# Patient Record
Sex: FEMALE | Race: WHITE | NOT HISPANIC OR LATINO | Employment: OTHER | URBAN - METROPOLITAN AREA
[De-identification: names, ages, dates, MRNs, and addresses within clinical notes are randomized per-mention and may not be internally consistent; named-entity substitution may affect disease eponyms.]

---

## 2017-01-23 ENCOUNTER — TRANSCRIBE ORDERS (OUTPATIENT)
Dept: ADMINISTRATIVE | Facility: HOSPITAL | Age: 67
End: 2017-01-23

## 2017-01-23 ENCOUNTER — HOSPITAL ENCOUNTER (OUTPATIENT)
Dept: GENERAL RADIOLOGY | Facility: HOSPITAL | Age: 67
Discharge: HOME | End: 2017-01-23
Attending: INTERNAL MEDICINE

## 2017-01-23 DIAGNOSIS — M79.672 LEFT FOOT PAIN: Primary | ICD-10-CM

## 2017-01-23 DIAGNOSIS — M25.572 LEFT ANKLE PAIN, UNSPECIFIED CHRONICITY: ICD-10-CM

## 2018-05-25 ENCOUNTER — APPOINTMENT (OUTPATIENT)
Dept: CT IMAGING | Facility: HOSPITAL | Age: 68
End: 2018-05-25

## 2018-05-25 ENCOUNTER — HOSPITAL ENCOUNTER (EMERGENCY)
Facility: HOSPITAL | Age: 68
Discharge: HOME | End: 2018-05-25
Attending: EMERGENCY MEDICINE | Admitting: EMERGENCY MEDICINE

## 2018-05-25 VITALS
TEMPERATURE: 98.3 F | SYSTOLIC BLOOD PRESSURE: 122 MMHG | DIASTOLIC BLOOD PRESSURE: 74 MMHG | HEIGHT: 67 IN | BODY MASS INDEX: 28.25 KG/M2 | RESPIRATION RATE: 18 BRPM | HEART RATE: 84 BPM | WEIGHT: 180 LBS | OXYGEN SATURATION: 95 %

## 2018-05-25 DIAGNOSIS — R56.9 PARTIAL SEIZURE (HCC): Primary | ICD-10-CM

## 2018-05-25 LAB
ANION GAP SERPL CALCULATED.3IONS-SCNC: 8 MMOL/L (ref 3–11)
BUN BLD-MCNC: 10 MG/DL (ref 9–23)
BUN/CREAT SERPL: 14.3 (ref 7–25)
CALCIUM SPEC-SCNC: 9.4 MG/DL (ref 8.7–10.4)
CHLORIDE SERPL-SCNC: 104 MMOL/L (ref 99–109)
CO2 SERPL-SCNC: 25 MMOL/L (ref 20–31)
CREAT BLD-MCNC: 0.7 MG/DL (ref 0.6–1.3)
DEPRECATED RDW RBC AUTO: 47.3 FL (ref 37–54)
ERYTHROCYTE [DISTWIDTH] IN BLOOD BY AUTOMATED COUNT: 15.3 % (ref 11.3–14.5)
GFR SERPL CREATININE-BSD FRML MDRD: 84 ML/MIN/1.73
GLUCOSE BLD-MCNC: 129 MG/DL (ref 70–100)
HCT VFR BLD AUTO: 38.9 % (ref 34.5–44)
HGB BLD-MCNC: 13.2 G/DL (ref 11.5–15.5)
HOLD SPECIMEN: NORMAL
HOLD SPECIMEN: NORMAL
MCH RBC QN AUTO: 28.8 PG (ref 27–31)
MCHC RBC AUTO-ENTMCNC: 33.9 G/DL (ref 32–36)
MCV RBC AUTO: 84.9 FL (ref 80–99)
PLATELET # BLD AUTO: 261 10*3/MM3 (ref 150–450)
PMV BLD AUTO: 9.5 FL (ref 6–12)
POTASSIUM BLD-SCNC: 3.9 MMOL/L (ref 3.5–5.5)
RBC # BLD AUTO: 4.58 10*6/MM3 (ref 3.89–5.14)
SODIUM BLD-SCNC: 137 MMOL/L (ref 132–146)
WBC NRBC COR # BLD: 6.06 10*3/MM3 (ref 3.5–10.8)
WHOLE BLOOD HOLD SPECIMEN: NORMAL
WHOLE BLOOD HOLD SPECIMEN: NORMAL

## 2018-05-25 PROCEDURE — 99284 EMERGENCY DEPT VISIT MOD MDM: CPT

## 2018-05-25 PROCEDURE — 80048 BASIC METABOLIC PNL TOTAL CA: CPT | Performed by: EMERGENCY MEDICINE

## 2018-05-25 PROCEDURE — 70450 CT HEAD/BRAIN W/O DYE: CPT

## 2018-05-25 PROCEDURE — 85027 COMPLETE CBC AUTOMATED: CPT | Performed by: EMERGENCY MEDICINE

## 2018-05-25 RX ORDER — MELATONIN
1000 DAILY
COMMUNITY

## 2018-05-25 RX ORDER — DIPHENHYDRAMINE HCL 25 MG
25 CAPSULE ORAL EVERY 6 HOURS PRN
Status: ON HOLD | COMMUNITY
End: 2020-08-28 | Stop reason: SDUPTHER

## 2018-05-25 RX ORDER — LEVETIRACETAM 500 MG/1
500 TABLET ORAL 2 TIMES DAILY
Qty: 60 TABLET | Refills: 1 | Status: SHIPPED | OUTPATIENT
Start: 2018-05-25 | End: 2019-01-17

## 2018-05-25 RX ORDER — FLUOXETINE HYDROCHLORIDE 20 MG/1
40 CAPSULE ORAL DAILY
Status: ON HOLD | COMMUNITY
End: 2020-08-28 | Stop reason: SDUPTHER

## 2018-05-25 RX ORDER — TRIMETHOPRIM 100 MG/1
100 TABLET ORAL 2 TIMES DAILY
COMMUNITY
End: 2020-08-28 | Stop reason: HOSPADM

## 2018-05-25 RX ORDER — GABAPENTIN 600 MG/1
900 TABLET ORAL DAILY
COMMUNITY
End: 2019-01-17

## 2018-05-25 RX ORDER — BUSPIRONE HYDROCHLORIDE 10 MG/1
10 TABLET ORAL 3 TIMES DAILY
COMMUNITY
End: 2019-01-17

## 2018-05-25 RX ORDER — UBIDECARENONE 100 MG
200 CAPSULE ORAL DAILY
COMMUNITY

## 2018-05-25 RX ORDER — SODIUM CHLORIDE 0.9 % (FLUSH) 0.9 %
10 SYRINGE (ML) INJECTION AS NEEDED
Status: DISCONTINUED | OUTPATIENT
Start: 2018-05-25 | End: 2018-05-25 | Stop reason: HOSPADM

## 2018-05-25 RX ORDER — ROSUVASTATIN CALCIUM 20 MG/1
20 TABLET, COATED ORAL DAILY
COMMUNITY

## 2018-05-25 RX ORDER — RANITIDINE 150 MG/1
150 TABLET ORAL 2 TIMES DAILY
COMMUNITY
End: 2019-01-17

## 2018-05-25 RX ORDER — ACETAMINOPHEN 325 MG/1
TABLET ORAL EVERY 6 HOURS PRN
Status: ON HOLD | COMMUNITY
End: 2020-08-27 | Stop reason: SDUPTHER

## 2018-05-25 RX ORDER — LEVETIRACETAM 500 MG/1
1000 TABLET ORAL ONCE
Status: COMPLETED | OUTPATIENT
Start: 2018-05-25 | End: 2018-05-25

## 2018-05-25 RX ORDER — CETIRIZINE HYDROCHLORIDE 10 MG/1
10 TABLET ORAL DAILY
COMMUNITY

## 2018-05-25 RX ORDER — ASPIRIN 81 MG/1
81 TABLET ORAL DAILY
COMMUNITY
End: 2019-01-17

## 2018-05-25 RX ADMIN — LEVETIRACETAM 1000 MG: 500 TABLET, FILM COATED ORAL at 13:52

## 2018-06-06 ENCOUNTER — APPOINTMENT (OUTPATIENT)
Dept: CT IMAGING | Facility: HOSPITAL | Age: 68
End: 2018-06-06

## 2018-06-06 ENCOUNTER — APPOINTMENT (OUTPATIENT)
Dept: GENERAL RADIOLOGY | Facility: HOSPITAL | Age: 68
End: 2018-06-06

## 2018-06-06 ENCOUNTER — HOSPITAL ENCOUNTER (EMERGENCY)
Facility: HOSPITAL | Age: 68
Discharge: HOME | End: 2018-06-06
Attending: EMERGENCY MEDICINE | Admitting: EMERGENCY MEDICINE

## 2018-06-06 VITALS
WEIGHT: 180 LBS | BODY MASS INDEX: 27.28 KG/M2 | HEIGHT: 68 IN | HEART RATE: 81 BPM | OXYGEN SATURATION: 96 % | SYSTOLIC BLOOD PRESSURE: 140 MMHG | TEMPERATURE: 98.6 F | RESPIRATION RATE: 18 BRPM | DIASTOLIC BLOOD PRESSURE: 77 MMHG

## 2018-06-06 DIAGNOSIS — R11.2 NON-INTRACTABLE VOMITING WITH NAUSEA, UNSPECIFIED VOMITING TYPE: ICD-10-CM

## 2018-06-06 DIAGNOSIS — N39.0 URINARY TRACT INFECTION WITHOUT HEMATURIA, SITE UNSPECIFIED: Primary | ICD-10-CM

## 2018-06-06 LAB
ALBUMIN SERPL-MCNC: 4.9 G/DL (ref 3.2–4.8)
ALBUMIN/GLOB SERPL: 1.9 G/DL (ref 1.5–2.5)
ALP SERPL-CCNC: 81 U/L (ref 25–100)
ALT SERPL W P-5'-P-CCNC: 51 U/L (ref 7–40)
ANION GAP SERPL CALCULATED.3IONS-SCNC: 11 MMOL/L (ref 3–11)
AST SERPL-CCNC: 48 U/L (ref 0–33)
BACTERIA UR QL AUTO: ABNORMAL /HPF
BASOPHILS # BLD AUTO: 0.01 10*3/MM3 (ref 0–0.2)
BASOPHILS NFR BLD AUTO: 0.1 % (ref 0–1)
BILIRUB SERPL-MCNC: 0.6 MG/DL (ref 0.3–1.2)
BILIRUB UR QL STRIP: NEGATIVE
BUN BLD-MCNC: 13 MG/DL (ref 9–23)
BUN/CREAT SERPL: 19.4 (ref 7–25)
CALCIUM SPEC-SCNC: 9.4 MG/DL (ref 8.7–10.4)
CARBAMAZEPINE SERPL-MCNC: <0.3 MCG/ML (ref 4–10)
CHLORIDE SERPL-SCNC: 93 MMOL/L (ref 99–109)
CLARITY UR: ABNORMAL
CO2 SERPL-SCNC: 22 MMOL/L (ref 20–31)
COLOR UR: ABNORMAL
CREAT BLD-MCNC: 0.67 MG/DL (ref 0.6–1.3)
D-LACTATE SERPL-SCNC: 2.3 MMOL/L (ref 0.5–2)
DEPRECATED RDW RBC AUTO: 41.8 FL (ref 37–54)
EOSINOPHIL # BLD AUTO: 0.01 10*3/MM3 (ref 0–0.3)
EOSINOPHIL NFR BLD AUTO: 0.1 % (ref 0–3)
ERYTHROCYTE [DISTWIDTH] IN BLOOD BY AUTOMATED COUNT: 14.3 % (ref 11.3–14.5)
GFR SERPL CREATININE-BSD FRML MDRD: 88 ML/MIN/1.73
GLOBULIN UR ELPH-MCNC: 2.6 GM/DL
GLUCOSE BLD-MCNC: 154 MG/DL (ref 70–100)
GLUCOSE UR STRIP-MCNC: NEGATIVE MG/DL
HCT VFR BLD AUTO: 40.9 % (ref 34.5–44)
HGB BLD-MCNC: 14.5 G/DL (ref 11.5–15.5)
HGB UR QL STRIP.AUTO: ABNORMAL
HOLD SPECIMEN: NORMAL
HYALINE CASTS UR QL AUTO: ABNORMAL /LPF
IMM GRANULOCYTES # BLD: 0.04 10*3/MM3 (ref 0–0.03)
IMM GRANULOCYTES NFR BLD: 0.3 % (ref 0–0.6)
KETONES UR QL STRIP: ABNORMAL
LEUKOCYTE ESTERASE UR QL STRIP.AUTO: ABNORMAL
LYMPHOCYTES # BLD AUTO: 0.81 10*3/MM3 (ref 0.6–4.8)
LYMPHOCYTES NFR BLD AUTO: 6.7 % (ref 24–44)
MAGNESIUM SERPL-MCNC: 1.8 MG/DL (ref 1.3–2.7)
MCH RBC QN AUTO: 28.5 PG (ref 27–31)
MCHC RBC AUTO-ENTMCNC: 35.5 G/DL (ref 32–36)
MCV RBC AUTO: 80.5 FL (ref 80–99)
MONOCYTES # BLD AUTO: 0.56 10*3/MM3 (ref 0–1)
MONOCYTES NFR BLD AUTO: 4.6 % (ref 0–12)
NEUTROPHILS # BLD AUTO: 10.78 10*3/MM3 (ref 1.5–8.3)
NEUTROPHILS NFR BLD AUTO: 88.5 % (ref 41–71)
NITRITE UR QL STRIP: POSITIVE
PH UR STRIP.AUTO: 5.5 [PH] (ref 5–8)
PLATELET # BLD AUTO: 344 10*3/MM3 (ref 150–450)
PMV BLD AUTO: 9.5 FL (ref 6–12)
POTASSIUM BLD-SCNC: 4.5 MMOL/L (ref 3.5–5.5)
PROT SERPL-MCNC: 7.5 G/DL (ref 5.7–8.2)
PROT UR QL STRIP: ABNORMAL
RBC # BLD AUTO: 5.08 10*6/MM3 (ref 3.89–5.14)
RBC # UR: ABNORMAL /HPF
REF LAB TEST METHOD: ABNORMAL
SODIUM BLD-SCNC: 126 MMOL/L (ref 132–146)
SP GR UR STRIP: 1.02 (ref 1–1.03)
SQUAMOUS #/AREA URNS HPF: ABNORMAL /HPF
TROPONIN I SERPL-MCNC: 0 NG/ML (ref 0–0.07)
UROBILINOGEN UR QL STRIP: ABNORMAL
WBC NRBC COR # BLD: 12.17 10*3/MM3 (ref 3.5–10.8)
WBC UR QL AUTO: ABNORMAL /HPF
WHOLE BLOOD HOLD SPECIMEN: NORMAL
WHOLE BLOOD HOLD SPECIMEN: NORMAL

## 2018-06-06 PROCEDURE — 71045 X-RAY EXAM CHEST 1 VIEW: CPT

## 2018-06-06 PROCEDURE — 84484 ASSAY OF TROPONIN QUANT: CPT

## 2018-06-06 PROCEDURE — 87077 CULTURE AEROBIC IDENTIFY: CPT | Performed by: PHYSICIAN ASSISTANT

## 2018-06-06 PROCEDURE — 99283 EMERGENCY DEPT VISIT LOW MDM: CPT

## 2018-06-06 PROCEDURE — 81001 URINALYSIS AUTO W/SCOPE: CPT | Performed by: PHYSICIAN ASSISTANT

## 2018-06-06 PROCEDURE — 87186 SC STD MICRODIL/AGAR DIL: CPT | Performed by: PHYSICIAN ASSISTANT

## 2018-06-06 PROCEDURE — 87086 URINE CULTURE/COLONY COUNT: CPT | Performed by: PHYSICIAN ASSISTANT

## 2018-06-06 PROCEDURE — 70450 CT HEAD/BRAIN W/O DYE: CPT

## 2018-06-06 PROCEDURE — 83735 ASSAY OF MAGNESIUM: CPT | Performed by: EMERGENCY MEDICINE

## 2018-06-06 PROCEDURE — 96361 HYDRATE IV INFUSION ADD-ON: CPT

## 2018-06-06 PROCEDURE — 96375 TX/PRO/DX INJ NEW DRUG ADDON: CPT

## 2018-06-06 PROCEDURE — 85025 COMPLETE CBC W/AUTO DIFF WBC: CPT | Performed by: EMERGENCY MEDICINE

## 2018-06-06 PROCEDURE — 80053 COMPREHEN METABOLIC PANEL: CPT | Performed by: EMERGENCY MEDICINE

## 2018-06-06 PROCEDURE — 25010000002 ONDANSETRON PER 1 MG: Performed by: PHYSICIAN ASSISTANT

## 2018-06-06 PROCEDURE — 96365 THER/PROPH/DIAG IV INF INIT: CPT

## 2018-06-06 PROCEDURE — 25010000002 CEFTRIAXONE PER 250 MG: Performed by: PHYSICIAN ASSISTANT

## 2018-06-06 PROCEDURE — 93005 ELECTROCARDIOGRAM TRACING: CPT | Performed by: EMERGENCY MEDICINE

## 2018-06-06 PROCEDURE — 80156 ASSAY CARBAMAZEPINE TOTAL: CPT | Performed by: PHYSICIAN ASSISTANT

## 2018-06-06 PROCEDURE — 83605 ASSAY OF LACTIC ACID: CPT | Performed by: PHYSICIAN ASSISTANT

## 2018-06-06 RX ORDER — ONDANSETRON 4 MG/1
4 TABLET, ORALLY DISINTEGRATING ORAL EVERY 6 HOURS PRN
Qty: 20 TABLET | Refills: 0 | Status: SHIPPED | OUTPATIENT
Start: 2018-06-06 | End: 2019-01-17

## 2018-06-06 RX ORDER — SODIUM CHLORIDE 0.9 % (FLUSH) 0.9 %
10 SYRINGE (ML) INJECTION AS NEEDED
Status: DISCONTINUED | OUTPATIENT
Start: 2018-06-06 | End: 2018-06-06 | Stop reason: HOSPADM

## 2018-06-06 RX ORDER — ONDANSETRON 2 MG/ML
4 INJECTION INTRAMUSCULAR; INTRAVENOUS ONCE
Status: COMPLETED | OUTPATIENT
Start: 2018-06-06 | End: 2018-06-06

## 2018-06-06 RX ORDER — CEFDINIR 300 MG/1
300 CAPSULE ORAL 2 TIMES DAILY
Qty: 20 CAPSULE | Refills: 0 | Status: SHIPPED | OUTPATIENT
Start: 2018-06-06 | End: 2019-01-17

## 2018-06-06 RX ORDER — CEFTRIAXONE SODIUM 1 G/50ML
1 INJECTION, SOLUTION INTRAVENOUS ONCE
Status: COMPLETED | OUTPATIENT
Start: 2018-06-06 | End: 2018-06-06

## 2018-06-06 RX ADMIN — ONDANSETRON 4 MG: 2 INJECTION, SOLUTION INTRAMUSCULAR; INTRAVENOUS at 14:24

## 2018-06-06 RX ADMIN — SODIUM CHLORIDE 1000 ML: 9 INJECTION, SOLUTION INTRAVENOUS at 14:25

## 2018-06-06 RX ADMIN — CEFTRIAXONE SODIUM 1 G: 1 INJECTION, SOLUTION INTRAVENOUS at 16:15

## 2018-06-06 NOTE — ED PROVIDER NOTES
Subjective   67-year-old female presents to the emergency department with multiple complaints including generalized weakness, nausea, vomiting, headaches, urinary frequency and urgency.  The symptoms of been ongoing for several days.  The patient spoke with her PCP (Dr. Marques) today and was sent to the emergency department for further evaluation.  The patient has a history of previous CVA with left-sided paralysis 2 years ago.  She was seen at our facility about 10 days ago for seizure activity (new onset) and was started on Keppra.  The patient states that the Keppra seemed to make her confused and weak.  She saw her neurologist (Dr. Hurley at ) and was tapered off the Keppra and started on carbamazepine.        History provided by:  Patient  Weakness - Generalized   Severity:  Moderate  Onset quality:  Gradual  Duration: several days.  Progression:  Waxing and waning  Chronicity:  New  Relieved by:  Nothing  Worsened by:  Nothing  Ineffective treatments:  None tried  Associated symptoms: difficulty walking (to weak to walk), headaches, nausea and vomiting    Associated symptoms: no abdominal pain, no chest pain, no cough, no diarrhea, no dysuria, no fever, no melena and no shortness of breath        Review of Systems   Constitutional: Positive for fatigue. Negative for chills and fever.   HENT: Negative for nosebleeds and sore throat.    Eyes: Negative for photophobia and visual disturbance.   Respiratory: Negative for cough and shortness of breath.    Cardiovascular: Negative for chest pain and leg swelling.   Gastrointestinal: Positive for nausea and vomiting. Negative for abdominal pain, blood in stool, diarrhea and melena.   Endocrine: Negative for polydipsia, polyphagia and polyuria.   Genitourinary: Negative for dysuria.   Musculoskeletal: Negative for back pain and neck pain.   Skin: Negative for pallor.   Allergic/Immunologic: Negative for immunocompromised state.   Neurological: Positive for weakness  (generalized) and headaches.   Hematological: Negative.    Psychiatric/Behavioral: Negative.        Past Medical History:   Diagnosis Date   • Hypertension    • Menopause    • Seizures    • Stroke    • Vaginal atrophy        No Known Allergies    Past Surgical History:   Procedure Laterality Date   • CRANIOTOMY     • HYSTERECTOMY     • UTERINE FIBROID SURGERY         History reviewed. No pertinent family history.    Social History     Social History   • Marital status:      Social History Main Topics   • Smoking status: Never Smoker   • Smokeless tobacco: Never Used   • Alcohol use No   • Drug use: No   • Sexual activity: Defer     Other Topics Concern   • Not on file           Objective   Physical Exam   Constitutional: She is oriented to person, place, and time. She appears well-developed and well-nourished. No distress.   HENT:   Head: Normocephalic.   Nose: Nose normal.   Mouth/Throat: Oropharynx is clear and moist.   Eyes: Conjunctivae are normal.   Neck: Normal range of motion. Neck supple.   Cardiovascular: Normal rate, regular rhythm, normal heart sounds and intact distal pulses.    Pulmonary/Chest: Effort normal and breath sounds normal.   Abdominal: Soft. Bowel sounds are normal. There is no tenderness.   Musculoskeletal: She exhibits no edema or tenderness.   Neurological: She is alert and oriented to person, place, and time.   Left sided paralysis.   Skin: Skin is warm and dry.   Psychiatric: She has a normal mood and affect.       Procedures           ED Course      Pt resting comfortably.  No acute distress.  She has a UTI.  Negative chest xray.  She is mildly hyponatremic.  LFTs are slightly up.  White count is 12.  She is afebrile here.  Will give her a dose of Rocephin here and if she can keep po fluids down, will d/c on Omnicef with close f/u.    4:31 PM  Pt keeping fluids down without nausea or vomiting.  Will d/c home on Omnicef.    Recent Results (from the past 24 hour(s))   Comprehensive  Metabolic Panel    Collection Time: 06/06/18  2:13 PM   Result Value Ref Range    Glucose 154 (H) 70 - 100 mg/dL    BUN 13 9 - 23 mg/dL    Creatinine 0.67 0.60 - 1.30 mg/dL    Sodium 126 (L) 132 - 146 mmol/L    Potassium 4.5 3.5 - 5.5 mmol/L    Chloride 93 (L) 99 - 109 mmol/L    CO2 22.0 20.0 - 31.0 mmol/L    Calcium 9.4 8.7 - 10.4 mg/dL    Total Protein 7.5 5.7 - 8.2 g/dL    Albumin 4.90 (H) 3.20 - 4.80 g/dL    ALT (SGPT) 51 (H) 7 - 40 U/L    AST (SGOT) 48 (H) 0 - 33 U/L    Alkaline Phosphatase 81 25 - 100 U/L    Total Bilirubin 0.6 0.3 - 1.2 mg/dL    eGFR Non African Amer 88 >60 mL/min/1.73    Globulin 2.6 gm/dL    A/G Ratio 1.9 1.5 - 2.5 g/dL    BUN/Creatinine Ratio 19.4 7.0 - 25.0    Anion Gap 11.0 3.0 - 11.0 mmol/L   Magnesium    Collection Time: 06/06/18  2:13 PM   Result Value Ref Range    Magnesium 1.8 1.3 - 2.7 mg/dL   Light Blue Top    Collection Time: 06/06/18  2:13 PM   Result Value Ref Range    Extra Tube hold for add-on    Green Top (Gel)    Collection Time: 06/06/18  2:13 PM   Result Value Ref Range    Extra Tube Hold for add-ons.    Lavender Top    Collection Time: 06/06/18  2:13 PM   Result Value Ref Range    Extra Tube hold for add-on    Gold Top - SST    Collection Time: 06/06/18  2:13 PM   Result Value Ref Range    Extra Tube Hold for add-ons.    CBC Auto Differential    Collection Time: 06/06/18  2:13 PM   Result Value Ref Range    WBC 12.17 (H) 3.50 - 10.80 10*3/mm3    RBC 5.08 3.89 - 5.14 10*6/mm3    Hemoglobin 14.5 11.5 - 15.5 g/dL    Hematocrit 40.9 34.5 - 44.0 %    MCV 80.5 80.0 - 99.0 fL    MCH 28.5 27.0 - 31.0 pg    MCHC 35.5 32.0 - 36.0 g/dL    RDW 14.3 11.3 - 14.5 %    RDW-SD 41.8 37.0 - 54.0 fl    MPV 9.5 6.0 - 12.0 fL    Platelets 344 150 - 450 10*3/mm3    Neutrophil % 88.5 (H) 41.0 - 71.0 %    Lymphocyte % 6.7 (L) 24.0 - 44.0 %    Monocyte % 4.6 0.0 - 12.0 %    Eosinophil % 0.1 0.0 - 3.0 %    Basophil % 0.1 0.0 - 1.0 %    Immature Grans % 0.3 0.0 - 0.6 %    Neutrophils, Absolute  10.78 (H) 1.50 - 8.30 10*3/mm3    Lymphocytes, Absolute 0.81 0.60 - 4.80 10*3/mm3    Monocytes, Absolute 0.56 0.00 - 1.00 10*3/mm3    Eosinophils, Absolute 0.01 0.00 - 0.30 10*3/mm3    Basophils, Absolute 0.01 0.00 - 0.20 10*3/mm3    Immature Grans, Absolute 0.04 (H) 0.00 - 0.03 10*3/mm3   Carbamazepine Level, Total    Collection Time: 06/06/18  2:13 PM   Result Value Ref Range    Carbamazepine Level <0.3 (L) 4.0 - 10.0 mcg/mL   POC Troponin, Rapid    Collection Time: 06/06/18  2:16 PM   Result Value Ref Range    Troponin I 0.00 0.00 - 0.07 ng/mL   Urinalysis With / Culture If Indicated - Urine, Catheter    Collection Time: 06/06/18  2:36 PM   Result Value Ref Range    Color, UA Dark Yellow (A) Yellow, Straw    Appearance, UA Turbid (A) Clear    pH, UA 5.5 5.0 - 8.0    Specific Gravity, UA 1.022 1.001 - 1.030    Glucose, UA Negative Negative    Ketones, UA 40 mg/dL (2+) (A) Negative    Bilirubin, UA Negative Negative    Blood, UA Large (3+) (A) Negative    Protein, UA >=300 mg/dL (3+) (A) Negative    Leuk Esterase, UA Moderate (2+) (A) Negative    Nitrite, UA Positive (A) Negative    Urobilinogen, UA 1.0 E.U./dL 0.2 - 1.0 E.U./dL   Urinalysis, Microscopic Only - Urine, Clean Catch    Collection Time: 06/06/18  2:36 PM   Result Value Ref Range    RBC, UA 3-6 (A) None Seen, 0-2 /HPF    WBC, UA Too Numerous to Count (A) None Seen, 0-2 /HPF    Bacteria, UA 4+ (A) None Seen, Trace /HPF    Squamous Epithelial Cells, UA None Seen None Seen, 0-2 /HPF    Hyaline Casts, UA 0-6 0 - 6 /LPF    Methodology Manual Light Microscopy      Note: In addition to lab results from this visit, the labs listed above may include labs taken at another facility or during a different encounter within the last 24 hours. Please correlate lab times with ED admission and discharge times for further clarification of the services performed during this visit.    CT Head Without Contrast   Final Result   There is a large area of encephalomalacia in the  "right   temporoparietal region, a chronic, stable finding. There are no acute   abnormalities.       D:  06/06/2018   E:  06/06/2018           This report was finalized on 6/6/2018 4:11 PM by Dr. Stewart Beckford MD.          XR Chest 1 View   Preliminary Result   Enlargement of the heart with no evidence of acute   parenchymal disease.       D:  06/06/2018   E:  06/06/2018                Vitals:    06/06/18 1354 06/06/18 1500 06/06/18 1501 06/06/18 1601   BP: 151/99 147/73     BP Location: Right arm      Patient Position: Lying      Pulse: 90  83 83   Resp: 18      Temp: 98.6 °F (37 °C)      TempSrc: Oral      SpO2: 97%  95% 96%   Weight: 81.6 kg (180 lb)      Height: 172.7 cm (68\")        Medications   sodium chloride 0.9 % flush 10 mL (not administered)   cefTRIAXone (ROCEPHIN) IVPB 1 g (1 g Intravenous New Bag 6/6/18 1615)   sodium chloride 0.9 % bolus 1,000 mL (0 mL Intravenous Stopped 6/6/18 1614)   ondansetron (ZOFRAN) injection 4 mg (4 mg Intravenous Given 6/6/18 1424)     ECG/EMG Results (last 24 hours)     Procedure Component Value Units Date/Time    ECG 12 Lead [[id number]] Collected:  06/06/18 1354     Updated:  06/06/18 1550    Narrative:       Test Reason : Weak/Dizzy/AMS protocol  Blood Pressure : **/** mmHG  Vent. Rate : 087 BPM     Atrial Rate : 087 BPM     P-R Int : 134 ms          QRS Dur : 070 ms      QT Int : 392 ms       P-R-T Axes : 024 005 005 degrees     QTc Int : 471 ms    Normal sinus rhythm  No previous ECGs available  Confirmed by ARELY TRIVEDI ([id number]) on 6/6/2018 3:49:58 PM    Referred By:  EDMD           Confirmed By:ARELY TRIVEDI                    [facility]      Final diagnoses:   Urinary tract infection without hematuria, site unspecified   Non-intractable vomiting with nausea, unspecified vomiting type            LEILA Briggs  06/06/18 1631    "

## 2018-06-06 NOTE — DISCHARGE INSTRUCTIONS
Omnicef and Zofran as prescribed.  Return to ER if worsening symptoms.  Follow up with Dr. Marques next week.

## 2018-06-08 LAB — BACTERIA SPEC AEROBE CULT: ABNORMAL

## 2018-06-09 NOTE — ED PROVIDER NOTES
Culture reviewed, sensitive to cephalosporins, treated with Rocephin in ED, placed on Omnicef outpatient.  No further activity necessary at this time.     Otilio Soliz PA-C  06/09/18 0801

## 2018-10-26 ENCOUNTER — TRANSCRIBE ORDERS (OUTPATIENT)
Dept: ADMINISTRATIVE | Facility: HOSPITAL | Age: 68
End: 2018-10-26

## 2018-10-26 DIAGNOSIS — Z12.31 VISIT FOR SCREENING MAMMOGRAM: Primary | ICD-10-CM

## 2018-12-06 ENCOUNTER — HOSPITAL ENCOUNTER (OUTPATIENT)
Dept: MAMMOGRAPHY | Facility: HOSPITAL | Age: 68
Discharge: HOME | End: 2018-12-06
Attending: INTERNAL MEDICINE | Admitting: INTERNAL MEDICINE

## 2018-12-06 DIAGNOSIS — Z12.31 VISIT FOR SCREENING MAMMOGRAM: ICD-10-CM

## 2018-12-06 PROCEDURE — 77067 SCR MAMMO BI INCL CAD: CPT

## 2018-12-06 PROCEDURE — 77063 BREAST TOMOSYNTHESIS BI: CPT

## 2018-12-06 PROCEDURE — 77063 BREAST TOMOSYNTHESIS BI: CPT | Performed by: RADIOLOGY

## 2018-12-06 PROCEDURE — 77067 SCR MAMMO BI INCL CAD: CPT | Performed by: RADIOLOGY

## 2019-01-17 ENCOUNTER — OFFICE VISIT (OUTPATIENT)
Dept: OBSTETRICS AND GYNECOLOGY | Facility: CLINIC | Age: 69
End: 2019-01-17

## 2019-01-17 VITALS
SYSTOLIC BLOOD PRESSURE: 120 MMHG | HEIGHT: 68 IN | WEIGHT: 191.6 LBS | BODY MASS INDEX: 29.04 KG/M2 | DIASTOLIC BLOOD PRESSURE: 70 MMHG

## 2019-01-17 DIAGNOSIS — N95.2 VAGINAL ATROPHY: ICD-10-CM

## 2019-01-17 DIAGNOSIS — Z78.0 MENOPAUSE: Primary | ICD-10-CM

## 2019-01-17 DIAGNOSIS — I63.411 CEREBROVASCULAR ACCIDENT (CVA) DUE TO EMBOLISM OF RIGHT MIDDLE CEREBRAL ARTERY (HCC): ICD-10-CM

## 2019-01-17 PROCEDURE — G0101 CA SCREEN;PELVIC/BREAST EXAM: HCPCS | Performed by: OBSTETRICS & GYNECOLOGY

## 2019-01-17 RX ORDER — OMEPRAZOLE 20 MG/1
1 CAPSULE, DELAYED RELEASE ORAL AS NEEDED
Status: ON HOLD | COMMUNITY
Start: 2014-05-16 | End: 2020-08-28 | Stop reason: SDUPTHER

## 2019-01-17 RX ORDER — RANITIDINE 300 MG/1
1 TABLET ORAL DAILY
Status: ON HOLD | COMMUNITY
Start: 2018-11-29 | End: 2020-08-11

## 2019-01-17 RX ORDER — HEPATITIS A VACCINE 1440 [IU]/ML
INJECTION, SUSPENSION INTRAMUSCULAR
COMMUNITY
Start: 2018-10-24 | End: 2020-07-23

## 2019-01-17 RX ORDER — GABAPENTIN 300 MG/1
1200 CAPSULE ORAL DAILY
COMMUNITY
Start: 2018-12-17

## 2019-01-17 RX ORDER — APIXABAN 5 MG/1
1 TABLET, FILM COATED ORAL 2 TIMES DAILY
COMMUNITY
Start: 2018-12-17

## 2019-01-17 NOTE — PROGRESS NOTES
Chief Complaint   Patient presents with   • Gynecologic Exam   • Hemorrhoids       Hodan Beverly is a 68 y.o. year old  presenting to be seen for her annual exam.  This patient has been absent from our care for some time.  She has a prior history of an abdominal hysterectomy/bilateral salpingo-oophorectomy for uterine leiomyomata.  She had previously had a uterine myomectomy.  She has had a cerebro-vascular accident with left-sided weakness.  She is anticoagulated and treated for hypertension.  She presents today with concerns about whether sexual activity is possible?  She has concerns about vaginal dryness and irritation.  She understands that she would not be able to use systemic estrogen replacement therapy.  She is in a wheelchair.  She also has complaints of chronic constipation and external hemorrhoids.  She is currently using Colace by mouth daily and ProctoCream HC, 2.5% to the rectum.    SCREENING TESTS    Year 2012   Age                         PAP                         HPV high risk                         Mammogram        benign                 OCTAVIO score                         Breast MRI                         Lipids                         Vitamin D                         Colonoscopy                         DEXA  Frax (hip/any)                         Ovarian Screen                             She exercises regularly: no.  She wears her seat belt: yes.  She has concerns about domestic violence: no.  She has noticed changes in height: no    GYN screening history:  · Last mammogram: was done on approximately 2018 and the result was: Birads II (Benign findings)..    No Additional Complaints Reported    The following portions of the patient's history were reviewed and updated as appropriate:vital signs and   She  has a past medical history of Asthma, Chronic constipation,  Depression, Hyperlipidemia, Hypertension, Menopause, Seizures (CMS/HCC), Stroke (CMS/HCC), and Vaginal atrophy.  She does not have any pertinent problems on file.  She  has a past surgical history that includes Uterine fibroid surgery; Craniotomy; Hysterectomy (1988); Breast excisional biopsy (Left, 2011); Cranioplasty; Oophorectomy; and Lung surgery (Left).  Her family history includes Breast cancer (age of onset: 67) in her sister; Ovarian cancer (age of onset: 79) in her mother.  She  reports that  has never smoked. she has never used smokeless tobacco. She reports that she does not drink alcohol or use drugs.  Current Outpatient Medications   Medication Sig Dispense Refill   • omeprazole (PRILOSEC) 20 MG capsule Take 1 capsule by mouth As Needed.     • acetaminophen (TYLENOL) 325 MG tablet Take  by mouth Every 6 (Six) Hours As Needed for Mild Pain .     • cetirizine (zyrTEC) 10 MG tablet Take 10 mg by mouth Daily.     • cholecalciferol (VITAMIN D3) 1000 units tablet Take 1,000 Units by mouth Daily.     • coenzyme Q10 100 MG capsule Take 200 mg by mouth Daily.     • diphenhydrAMINE (BENADRYL) 25 mg capsule Take 25 mg by mouth Every 6 (Six) Hours As Needed for Itching.     • ELIQUIS 5 MG tablet tablet Take 1 tablet by mouth 2 (Two) Times a Day.     • FLUoxetine (PROzac) 20 MG capsule Take 40 mg by mouth Daily.     • gabapentin (NEURONTIN) 300 MG capsule Take 1,200 mg by mouth Daily.     • HAVRIX 1440 EL U/ML vaccine      • metoprolol tartrate (LOPRESSOR) 25 MG tablet Take 25 mg by mouth 2 (Two) Times a Day.     • Multiple Vitamins-Minerals (MULTIVITAMIN ADULT PO) Take  by mouth.     • PROCTOZONE-HC 2.5 % rectal cream      • raNITIdine (ZANTAC) 300 MG tablet Take 1 tablet by mouth Daily.     • rosuvastatin (CRESTOR) 20 MG tablet Take 20 mg by mouth Daily.     • trimethoprim (TRIMPEX) 100 MG tablet Take 100 mg by mouth 2 (Two) Times a Day.       No current facility-administered medications for this visit.      She  "has No Known Allergies..    Review of Systems  A comprehensive review of systems was taken.  Constitutional: negative for fever, chills, activity change, appetite change, fatigue and unexpected weight change.  Respiratory: negative  Cardiovascular: negative  Gastrointestinal: positive for constipation  Genitourinary:positive for dryness and irritation  Musculoskeletal:positive for left sided weakness  Behavioral/Psych: negative       /70   Ht 172.7 cm (68\")   Wt 86.9 kg (191 lb 9.6 oz)   BMI 29.13 kg/m²     Physical Exam    General:  alert; cooperative; well developed; well nourished  obese - Body mass index is 29.13 kg/m².   Skin:  No suspicious lesions seen   Thyroid: normal to inspection and palpation   Lungs:  clear to auscultation bilaterally   Heart:  regular rate and rhythm, S1, S2 normal, no murmur, click, rub or gallop   Breasts:  Examined in supine position  Symmetric without masses or skin dimpling  Nipples normal without inversion, lesions or discharge  There are no palpable axillary nodes   Abdomen: soft, non-tender; no masses  no umbilical or inguinal hernias are present  no hepato-splenomegaly   Pelvis: Clinical staff was present for exam  External genitalia:  vulvar atrophy  Vaginal:  atrophic mucosal changes are present;  Cervix:  absent.  Uterus:  absent.  Adnexa:  absent, bilateral.  Rectal:  digital rectal exam not performed; anus visually normal appearing. external hemorrhoids present;     Lab Review   No data reviewed    Imaging  Mammogram results         ASSESSMENT  Problems Addressed this Visit        Cardiovascular and Mediastinum    Stroke (CMS/HCC)       Genitourinary    Vaginal atrophy    Menopause - Primary           Vulvar atrophy    PLAN    · Medications prescribed this encounter:  No orders of the defined types were placed in this encounter.  · I have written for estradiol cream 0.1 mg/g to be inserted as 1 g intravaginally twice a week.  I have asked her to get clearance " from Dr. Marques about whether she may use estrogen cream?  · Monthly self breast assessment and annual breast imaging  · Calcium, 600 mg/ Vit. D, 400 IU daily; regular weight-bearing exercise  · Follow up: 12 month(s)  *Please note that portions of this documentation may have been completed with a voice recognition program.  Efforts were made to edit this dictation, but occasional words may have been mistranscribed.       This note was electronically signed.    CHRISTIANO Hawthorne MD  January 17, 2019  3:15 PM

## 2019-01-25 PROBLEM — M81.0 SENILE OSTEOPOROSIS: Status: ACTIVE | Noted: 2019-01-25

## 2019-01-29 ENCOUNTER — TRANSCRIBE ORDERS (OUTPATIENT)
Dept: PHYSICAL THERAPY | Facility: HOSPITAL | Age: 69
End: 2019-01-29

## 2019-01-29 DIAGNOSIS — I63.9 CEREBROVASCULAR ACCIDENT (CVA), UNSPECIFIED MECHANISM (HCC): Primary | ICD-10-CM

## 2019-02-06 ENCOUNTER — INFUSION (OUTPATIENT)
Dept: ONCOLOGY | Facility: HOSPITAL | Age: 69
End: 2019-02-06

## 2019-02-06 VITALS
SYSTOLIC BLOOD PRESSURE: 113 MMHG | TEMPERATURE: 97.7 F | HEART RATE: 75 BPM | RESPIRATION RATE: 18 BRPM | DIASTOLIC BLOOD PRESSURE: 57 MMHG

## 2019-02-06 DIAGNOSIS — M81.0 SENILE OSTEOPOROSIS: Primary | ICD-10-CM

## 2019-02-06 LAB
CREAT BLDA-MCNC: 0.8 MG/DL (ref 0.6–1.3)
CREATINE SERPL-MCNC: 0.8 MG/DL

## 2019-02-06 PROCEDURE — 96374 THER/PROPH/DIAG INJ IV PUSH: CPT

## 2019-02-06 PROCEDURE — 82565 ASSAY OF CREATININE: CPT | Performed by: INTERNAL MEDICINE

## 2019-02-06 PROCEDURE — 25010000002 ZOLEDRONIC ACID 5 MG/100ML SOLUTION: Performed by: INTERNAL MEDICINE

## 2019-02-06 RX ORDER — ZOLEDRONIC ACID 5 MG/100ML
5 INJECTION, SOLUTION INTRAVENOUS ONCE
Status: COMPLETED | OUTPATIENT
Start: 2019-02-06 | End: 2019-02-06

## 2019-02-06 RX ORDER — ZOLEDRONIC ACID 5 MG/100ML
5 INJECTION, SOLUTION INTRAVENOUS ONCE
Status: CANCELLED | OUTPATIENT
Start: 2019-02-06

## 2019-02-06 RX ADMIN — ZOLEDRONIC ACID 5 MG: 5 INJECTION, SOLUTION INTRAVENOUS at 13:55

## 2019-03-18 ENCOUNTER — TRANSCRIBE ORDERS (OUTPATIENT)
Dept: ADMINISTRATIVE | Facility: HOSPITAL | Age: 69
End: 2019-03-18

## 2019-03-18 DIAGNOSIS — R60.0 LOCALIZED EDEMA: Primary | ICD-10-CM

## 2019-03-26 ENCOUNTER — HOSPITAL ENCOUNTER (OUTPATIENT)
Dept: PHYSICAL THERAPY | Facility: HOSPITAL | Age: 69
Setting detail: THERAPIES SERIES
Discharge: HOME | End: 2019-03-26

## 2019-03-26 DIAGNOSIS — R26.89 BALANCE PROBLEM: ICD-10-CM

## 2019-03-26 DIAGNOSIS — M21.862 ACQUIRED GENU RECURVATUM OF LEFT KNEE: ICD-10-CM

## 2019-03-26 DIAGNOSIS — R26.9 GAIT DIFFICULTY: Primary | ICD-10-CM

## 2019-03-26 PROCEDURE — 97162 PT EVAL MOD COMPLEX 30 MIN: CPT | Performed by: PHYSICAL THERAPIST

## 2019-03-26 NOTE — THERAPY EVALUATION
"    .Outpatient Physical Therapy Neuro Initial Evaluation  [facility]     Patient Name: Hodan Beverly  : 1950  MRN: [id number]  Today's Date: 3/26/2019      Visit Date: 2019    Patient Active Problem List   Diagnosis   • Vaginal atrophy   • Stroke (CMS/HCC)   • Seizures (CMS/HCC)   • Menopause   • Hypertension   • Hyperlipidemia   • Depression   • Chronic constipation   • Asthma   • Senile osteoporosis        Past Medical History:   Diagnosis Date   • Asthma    • Chronic constipation    • Depression    • Hyperlipidemia    • Hypertension    • Menopause    • Seizures (CMS/HCC)    • Stroke (CMS/HCC)    • Vaginal atrophy         Past Surgical History:   Procedure Laterality Date   • BREAST EXCISIONAL BIOPSY Left    • CRANIOPLASTY     • CRANIOTOMY     • HYSTERECTOMY  1988    oophorectomy, unilateral   • LUNG SURGERY Left     removal of nodule   • OOPHORECTOMY      unilateral   • UTERINE FIBROID SURGERY           Visit Dx:     ICD-10-CM ICD-9-CM   1. Gait difficulty R26.9 781.2   2. Acquired genu recurvatum of left knee M21.862 736.5   3. Balance problem R26.89 781.99       Patient History     Row Name 19 1100             History    Chief Complaint  Balance Problems;Difficulty Walking;Numbness  -MW      Date Current Problem(s) Began  12/01/15  -MW      Brief Description of Current Complaint  Pt. reports having CVA Dec 2015 and pt was inpatient [facility] for 5 weeks and then had outpatient therapy at [facility] for over 1 1/2 years.  Pt. reports then seeing Tariq Urrutia at [facility] therapy since being discharged from [facility] outpatient.  Pt. choose [facility] secondary to distance being closure to home.  Pt. reports that she is \"stuck\" b/c she doesn't want to walk.  Pt. does not like her L double metal upright L AFO and pt reports a plateau in function.  Pt. has had her AFO for a year but feels unstable in L LE.  Pt. has a fear of falling.  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  No  -MW      Are you being neglected by a caregiver  No  -MW        User Key  (r) = Recorded By, (t) = Taken By, (c) = Cosigned By    Initials Name Provider Type    MW Maria Alejandra Layton, PT Physical Therapist              PT Neuro     Row Name 03/26/19 1100             Subjective Pain    Able to rate subjective pain?  yes  -MW      Pre-Treatment Pain Level  0  -MW      Post-Treatment Pain Level  0  -MW         Home Living    Living Arrangements  house  -MW      Home Accessibility  -- ramp to enter home  -MW      Home Equipment  Grab bars;Quad cane;Wheelchair-manual bars attached to toilet; tub bench; SBQC  -MW      Living Environment Comment  lives with   -MW         Vision-Basic Assessment    Current Vision  Wears glasses all the time  -MW         Cognition    Overall Cognitive Status  WFL  -MW         Sensation    Light Touch  Severe deficits in the LLE  -MW      Additional Comments  pt unable to feel deep pressure L LE  -MW         Proprioception    Proprioception  absent L big toe and ankle   -MW         Posture/Observations    Alignment Options  Rounded shoulders L UE flaccid, decreased L LE WB, L genu recurvatum  -MW      Rounded Shoulders  Left:;Moderate;Right:;Mild  -MW      Posture/Observations Comments  -- left sided neglect  -MW         Coordination    Coordination Tests  Rapid Alternating  -MW      Rapid Alternating  Left:;Absent  -MW         General ROM    RT Lower Ext  Rt  Ankle Dorsiflexion  -MW      LT Lower Ext  Lt Ankle Dorsiflexion  -MW         Right Lower Ext    Rt Ankle Dorsiflexion PROM  10 degrees  -MW         Left Lower Ext    Lt Ankle Dorsiflexion PROM  11 degrees from neutral  -MW         MMT (Manual Muscle Testing)    Rt Lower Ext  Rt Hip Flexion;Rt Hip Extension;Rt Hip ABduction;Rt Hip ADduction;Rt Knee Extension;Rt Knee Flexion;Rt Ankle Plantarflexion;Rt Ankle Dorsiflexion  -MW      Lt Lower Ext  Lt Hip Flexion;Lt Hip Extension;Lt Hip ABduction;Lt Hip ADduction;Lt Knee Extension;Lt Knee Flexion;Lt Ankle Plantarflexion;Lt Ankle Dorsiflexion  -MW         MMT Right Lower Ext    Rt Hip Flexion MMT, Gross Movement  (4-/5) good minus  -MW      Rt Hip Extension MMT, Gross Movement  (3+/5) fair plus  -MW      Rt Hip ABduction MMT, Gross Movement  (4-/5) good minus  -MW      Rt Hip ADduction MMT, Gross Movement  (4-/5) good minus  -MW      Rt Knee Extension MMT, Gross Movement  (4-/5) good minus  -MW      Rt Knee Flexion MMT, Gross Movement  (4-/5) good minus  -MW      Rt Ankle Plantarflexion MMT, Gross Movement  (4-/5) good minus  -MW      Rt Ankle Dorsiflexion MMT, Gross Movement  (4-/5) good minus  -MW         MMT Left Lower Ext    Lt Hip Flexion MMT, Gross Movement  (3/5) fair  -MW      Lt Hip Extension MMT, Gross Movement  (2-/5) poor minus  -MW      Lt Hip ABduction MMT, Gross Movement  (1/5) trace  -MW      Lt Hip ADduction MMT, Gross Movement  (2+/5) poor plus  -MW      Lt Knee Extension MMT, Gross Movement  (3-/5) fair minus  -MW      Lt Knee Flexion MMT, Gross Movement  (1/5) trace  -MW      Lt Ankle Plantarflexion MMT, Gross Movement  (3+/5) fair plus  -MW      Lt Ankle Dorsiflexion MMT, Gross Movement  (2-/5) poor minus  -MW      Lt Lower Extremity Comments   pt unable to truly DF with mostly inverting  -MW         Tone    UE Tone  left:;Flaccid  -MW      LE Tone  left:;Hypotonic supine with PROM  -MW         Bed Mobility Assessment/Treatment    Bed Mobility  Assessment/Treatment  bed mobility (all) activities  -MW      [place] Level (Bed Mobility)  minimum assist (75% patient effort)  -MW      Bed Mobility, Safety Issues  decreased use of arms for pushing/pulling;decreased use of legs for bridging/pushing  -MW         Transfers    Bed-Chair [place] (Transfers)  minimum assist (75% patient effort)  -MW      Chair-Bed [place] (Transfers)  minimum assist (75% patient effort)  -MW      Assistive Device (Bed-Chair Transfers)  cane, quad  -MW      Sit-Stand [place] (Transfers)  minimum assist (75% patient effort)  -MW      Stand-Sit [place] (Transfers)  minimum assist (75% patient effort)  -MW      Comment (Transfers)  decreased L LE WB; L neglect  -MW         Gait/Stairs Assessment/Training    [place] Level (Gait)  minimum assist (75% patient effort)  -MW      Assistive Device (Gait)  cane, quad L double metal upright AFO  -MW      Distance in Feet (Gait)  30  -MW      Pattern (Gait)  3-point  -MW      Deviations/Abnormal Patterns (Gait)  base of support, wide;leslie decreased;left sided deviations;stride length decreased  -MW      Bilateral Gait Deviations  forward flexed posture;leans right;knee hyperextension L UE flaccid  -MW      Left Sided Gait Deviations  knee hyperextension;hip circumduction;leans right;forward flexed posture  -MW      Right Sided Gait Deviations  leans right  -MW      Negotiation (Stairs)  -- n/t  -MW        User Key  (r) = Recorded By, (t) = Taken By, (c) = Cosigned By    Initials Name Provider Type    Maria Alejandra Singh, PT Physical Therapist                  Therapy Education  Education Details: pt and pts dtr educated about new L LE brace  Given: Symptoms/condition management  How Provided: Verbal  Provided to: Patient  Level of Understanding: Verbalized    PT OP Goals     Row Name 03/26/19 1100          PT Short Term Goals    STG Date to Achieve  05/07/19  -MW     STG 1  Patient to improve SILVA balance score to  >/= 20/56 to decrease client's risk of falls.  -     STG 1 Progress  New  -MW     STG 2  Patient to ambulate 10 meters with AD within 60 sec with CGA without LOB for improved gait leslie and functional mobility.  -MW     STG 2 Progress  New  -MW     STG 3  Pt. to perform sit to stand with UE A from various heights and surfaces 2/2 trials without LOB with CGA to improve functional mobility.  -MW     STG 3 Progress  New  -     STG 4  Pt. to perform supine to/from sit with UE A as needed with A 2/2 trials to improve functional mobility.  -     STG 4 Progress  New  -     STG 5  Pt. to stand with equal B LE WB up to 1 min with R UE reaching across midline without assist for improved stability.  -     STG 5 Progress  New  -        Long Term Goals    LTG Date to Achieve  06/18/19  -     LTG 1  Patient to improve SILVA balance score to >/= 35/56 to decrease client's risk of falls.  -MW     LTG 1 Progress  New  -     LTG 2  Patient to ambulate 10 meters with AD within 45 sec without LOB for improved gait leslie and functional mobility.  -MW     LTG 2 Progress  New  -MW     LTG 3  Pt. to perform sit to stand without UE A from various heights and surfaces 2/2 trials without LOB with mod I to improve functional mobility.  -     LTG 3 Progress  New  -     LTG 4  Pt. to stand without UE A up to 1 min with R UE overhead reaching activities for improved functional mobility.  -     LTG 4 Progress  New  -     LTG 5  Pt. to perform B supine to/from sit with mod I consistently for improved functional mobility.  -     LTG 5 Progress  New  -     LTG 6  Pt. to be mod I with assist from family from [facility].  -     LTG 6 Progress  New  -        Time Calculation    PT Goal Re-Cert Due Date  06/24/19  -       User Key  (r) = Recorded By, (t) = Taken By, (c) = Cosigned By    Initials Name Provider Type    Maria Alejandra Singh, PT Physical Therapist          PT Assessment/Plan     Row Name 03/26/19 1100           PT Assessment    Functional Limitations  Decreased safety during functional activities;Impaired gait;Impaired locomotion;Limitations in community activities;Performance in leisure activities;Performance in self-care ADL  -     Impairments  Balance;Coordination;Endurance;Gait;Locomotion;Muscle strength;Sensation impaired proprioception  -     Assessment Comments  Pt. presents with evolving symptoms following CVA three years ago.  Pt. to benefit from new R LE brace to assist with not only L ankle supination but L genu recurvatum to increase pts L LE stability in stance.  Pt. to benefit from skilled PT services to improve gait, balance, strength, transfers, bed mobility and overall functional mobility.  Pt. to benefit from skilled OT services to assist with L UE and posture.  Pt. to benefit from OT/PT co-tx sessions to improve functional mobility.  -     Please refer to paper survey for additional self-reported information  Yes  -MW     Rehab Potential  Fair  -MW     Patient/caregiver participated in establishment of treatment plan and goals  Yes  -MW     Patient would benefit from skilled therapy intervention  Yes  -MW        PT Plan    PT Frequency  1x/week;2x/week  -     Predicted Duration of Therapy Intervention (Therapy Eval)  16 visits  -     Planned CPT's?  PT EVAL MOD COMPLELITY: 89322;PT THER PROC EA 15 MIN: 52845;PT THER ACT EA 15 MIN: 07302;PT NEUROMUSC RE-EDUCATION EA 15 MIN: 35168;PT GAIT TRAINING EA 15 MIN: 63154;PT ELECTRICAL STIM UNATTEND: ;PT ELECTRICAL STIM ATTD EA 15 MIN: 90665  -     PT Plan Comments  PT services to improve gait, balance, strength, transfers and overall functional mobility.  Pt. to be scheduled for OT services.  -       User Key  (r) = Recorded By, (t) = Taken By, (c) = Cosigned By    Initials Name Provider Type    Maria Alejandra Singh, PT Physical Therapist             Exercises     Row Name 03/26/19 1100             Subjective Pain    Able to rate subjective  pain?  yes  -MW      Pre-Treatment Pain Level  0  -MW      Post-Treatment Pain Level  0  -MW        User Key  (r) = Recorded By, (t) = Taken By, (c) = Cosigned By    Initials Name Provider Type    Maria Alejandra Singh, PT Physical Therapist                      Outcome Measure Options: 10 Meter Walk, Goodwin Balance  Gait, Assistive Device: quad cane(CGA)  10 Meter Walk Test Self-Selected Velocity  Self-Selected Velocity: Trial 1: 88.05 sec.  Goodwin Balance Scale  Sitting to Standing: needs minimal aid to stand or stabilize  Standing Unsupported: needs several tries to stand 30 seconds unsupported  Sitting with Back Unsupported but Feet Supported on Floor or on Stool: able to sit safely and securely for 2 minutes  Standing to Sitting: sits independently but has uncontrolled descent  Transfers: needs one person to assist  Standing Unsupported with Eyes Closed: needs help to keep from falling  Standing Unsupported with Feet Together: needs help to attain position and unable to hold for 15 seconds  Reaching Forward with Outstretched Arm While Standing: reaches forward but needs supervision   Object From the Floor From a Standing Position: unable to try/needs assist to keep from losing balance or falling  Turning to Look Behind Over Left and Right Shoulders While Standing: needs assist to keep from losing balance or falling  Turn 360 Degrees: needs assistance while turning  Place Alternate Foot on Step or Stool While Standing Unsupported: needs assistance to keep from falling/unable to try  Standing Unsupported with One Foot in Front: loses balance while stepping or standing  Standing on One Leg: unable to try of needs assist to prevent fall  Goodwin Total Score: 9       Time Calculation:   Start Time: 1115   Therapy Charges for Today     Code Description Service Date Service Provider Modifiers Qty    [id number]  PT EVAL MOD COMPLEXITY 3 3/26/2019 Maria Alejandra Layton, PT GP 1          PT G-Codes  Outcome Measure  Options: 10 Meter Walk, Goodwin Balance  Goodwin Total Score: 9         Maria Alejandra Layton, PT  3/26/2019

## 2019-04-02 ENCOUNTER — HOSPITAL ENCOUNTER (OUTPATIENT)
Dept: CARDIOLOGY | Facility: HOSPITAL | Age: 69
Discharge: HOME | End: 2019-04-02
Admitting: INTERNAL MEDICINE

## 2019-04-02 VITALS — WEIGHT: 191 LBS | BODY MASS INDEX: 28.95 KG/M2 | HEIGHT: 68 IN

## 2019-04-02 DIAGNOSIS — R60.0 LOCALIZED EDEMA: ICD-10-CM

## 2019-04-02 LAB
BH CV ECHO MEAS - AI DEC SLOPE: 284.5 CM/SEC^2
BH CV ECHO MEAS - AI MAX PG: 77.8 MMHG
BH CV ECHO MEAS - AI MAX VEL: 441 CM/SEC
BH CV ECHO MEAS - AI P1/2T: 454 MSEC
BH CV ECHO MEAS - AO ROOT AREA (BSA CORRECTED): 1.4
BH CV ECHO MEAS - AO ROOT AREA: 6.2 CM^2
BH CV ECHO MEAS - AO ROOT DIAM: 2.8 CM
BH CV ECHO MEAS - BSA(HAYCOCK): 2.1 M^2
BH CV ECHO MEAS - BSA: 2 M^2
BH CV ECHO MEAS - BZI_BMI: 29 KILOGRAMS/M^2
BH CV ECHO MEAS - BZI_METRIC_HEIGHT: 172.7 CM
BH CV ECHO MEAS - BZI_METRIC_WEIGHT: 86.6 KG
BH CV ECHO MEAS - EDV(CUBED): 128.8 ML
BH CV ECHO MEAS - EDV(MOD-SP2): 70 ML
BH CV ECHO MEAS - EDV(MOD-SP4): 70 ML
BH CV ECHO MEAS - EDV(TEICH): 121 ML
BH CV ECHO MEAS - EF(CUBED): 61.9 %
BH CV ECHO MEAS - EF(MOD-BP): 61 %
BH CV ECHO MEAS - EF(MOD-SP2): 62.9 %
BH CV ECHO MEAS - EF(MOD-SP4): 62.9 %
BH CV ECHO MEAS - EF(TEICH): 53.2 %
BH CV ECHO MEAS - ESV(CUBED): 49 ML
BH CV ECHO MEAS - ESV(MOD-SP2): 26 ML
BH CV ECHO MEAS - ESV(MOD-SP4): 26 ML
BH CV ECHO MEAS - ESV(TEICH): 56.6 ML
BH CV ECHO MEAS - FS: 27.5 %
BH CV ECHO MEAS - IVS/LVPW: 0.99
BH CV ECHO MEAS - IVSD: 0.79 CM
BH CV ECHO MEAS - LAD MAJOR: 4.2 CM
BH CV ECHO MEAS - LAT PEAK E' VEL: 7.6 CM/SEC
BH CV ECHO MEAS - LATERAL E/E' RATIO: 7
BH CV ECHO MEAS - LV DIASTOLIC VOL/BSA (35-75): 34.9 ML/M^2
BH CV ECHO MEAS - LV MASS(C)D: 136.6 GRAMS
BH CV ECHO MEAS - LV MASS(C)DI: 68.1 GRAMS/M^2
BH CV ECHO MEAS - LV SYSTOLIC VOL/BSA (12-30): 13 ML/M^2
BH CV ECHO MEAS - LVIDD: 5.1 CM
BH CV ECHO MEAS - LVIDS: 3.7 CM
BH CV ECHO MEAS - LVLD AP2: 6.7 CM
BH CV ECHO MEAS - LVLD AP4: 6.5 CM
BH CV ECHO MEAS - LVLS AP2: 5.9 CM
BH CV ECHO MEAS - LVLS AP4: 5.6 CM
BH CV ECHO MEAS - LVPWD: 0.8 CM
BH CV ECHO MEAS - MED PEAK E' VEL: 6.8 CM/SEC
BH CV ECHO MEAS - MEDIAL E/E' RATIO: 7.8
BH CV ECHO MEAS - MV A MAX VEL: 74.5 CM/SEC
BH CV ECHO MEAS - MV DEC TIME: 0.25 SEC
BH CV ECHO MEAS - MV E MAX VEL: 53.3 CM/SEC
BH CV ECHO MEAS - MV E/A: 0.72
BH CV ECHO MEAS - PA ACC SLOPE: 218 CM/SEC^2
BH CV ECHO MEAS - PA ACC TIME: 0.2 SEC
BH CV ECHO MEAS - PA PR(ACCEL): -9.7 MMHG
BH CV ECHO MEAS - PI END-D VEL: 80.1 CM/SEC
BH CV ECHO MEAS - RAP SYSTOLE: 8 MMHG
BH CV ECHO MEAS - RVSP: 34 MMHG
BH CV ECHO MEAS - SI(CUBED): 39.8 ML/M^2
BH CV ECHO MEAS - SI(MOD-SP2): 22 ML/M^2
BH CV ECHO MEAS - SI(MOD-SP4): 22 ML/M^2
BH CV ECHO MEAS - SI(TEICH): 32.1 ML/M^2
BH CV ECHO MEAS - SV(CUBED): 79.8 ML
BH CV ECHO MEAS - SV(MOD-SP2): 44 ML
BH CV ECHO MEAS - SV(MOD-SP4): 44 ML
BH CV ECHO MEAS - SV(TEICH): 64.4 ML
BH CV ECHO MEAS - TAPSE (>1.6): 1.6 CM2
BH CV ECHO MEAS - TR MAX PG: 26 MMHG
BH CV ECHO MEAS - TR MAX VEL: 255 CM/SEC
BH CV ECHO MEASUREMENTS AVERAGE E/E' RATIO: 7.4
BH CV VAS BP LEFT ARM: NORMAL MMHG
BH CV XLRA - RV BASE: 3.5 CM
BH CV XLRA - RV LENGTH: 7.7 CM
BH CV XLRA - RV MID: 3.1 CM
BH CV XLRA - TDI S': 11.3 CM/SEC
LEFT ATRIUM VOLUME INDEX: 19.5 ML/M^2
LEFT ATRIUM VOLUME: 39 ML
LV EF 2D ECHO EST: 60 %

## 2019-04-02 PROCEDURE — 93306 TTE W/DOPPLER COMPLETE: CPT

## 2019-04-02 PROCEDURE — 93306 TTE W/DOPPLER COMPLETE: CPT | Performed by: INTERNAL MEDICINE

## 2019-04-03 ENCOUNTER — TRANSCRIBE ORDERS (OUTPATIENT)
Dept: PHYSICAL THERAPY | Facility: HOSPITAL | Age: 69
End: 2019-04-03

## 2019-04-03 DIAGNOSIS — M62.81 MUSCLE WEAKNESS (GENERALIZED): Primary | ICD-10-CM

## 2019-04-04 ENCOUNTER — HOSPITAL ENCOUNTER (OUTPATIENT)
Dept: PHYSICAL THERAPY | Facility: HOSPITAL | Age: 69
Setting detail: THERAPIES SERIES
Discharge: HOME | End: 2019-04-04

## 2019-04-04 DIAGNOSIS — M21.862 ACQUIRED GENU RECURVATUM OF LEFT KNEE: ICD-10-CM

## 2019-04-04 DIAGNOSIS — R26.9 GAIT DIFFICULTY: Primary | ICD-10-CM

## 2019-04-04 PROCEDURE — 97110 THERAPEUTIC EXERCISES: CPT | Performed by: PHYSICAL THERAPIST

## 2019-04-04 PROCEDURE — 97112 NEUROMUSCULAR REEDUCATION: CPT | Performed by: PHYSICAL THERAPIST

## 2019-04-04 NOTE — THERAPY TREATMENT NOTE
"    Outpatient Physical Therapy Neuro Treatment Note  [facility]     Patient Name: Hodan Beverly  : 1950  MRN: [id number]  Today's Date: 2019      Visit Date: 2019    Visit Dx:    ICD-10-CM ICD-9-CM   1. Gait difficulty R26.9 781.2   2. Acquired genu recurvatum of left knee M21.862 736.5       Patient Active Problem List   Diagnosis   • Vaginal atrophy   • Stroke (CMS/HCC)   • Seizures (CMS/HCC)   • Menopause   • Hypertension   • Hyperlipidemia   • Depression   • Chronic constipation   • Asthma   • Senile osteoporosis           PT Neuro     Row Name 19 1115             Subjective Comments    Subjective Comments  \"I'm ready for this.\"  -MW         Subjective Pain    Able to rate subjective pain?  yes  -MW      Pre-Treatment Pain Level  0  -MW      Post-Treatment Pain Level  0  -MW         Balance Skills Training    61500 -  PT Neuromuscular Reeducation Minutes  30  -MW      Training Strategies (Balance)  Pt. brought in a box of new B knee braces and two AFO's.  All braces are not appropriate to stabilize pts L LE.  Standing balance in parallel bars without UE with L wt shifting x 10 with min A.  St. balance with mod A for L LE stability with R LE on 8\" step and hold with mod A and then R LE foot tapping to step x 10, heel tapping x 10 with mod/max A with emphasis on keeping chest upright, L hip neutral vs hip flexion and keeping L knee slightly flexed.    -MW        User Key  (r) = Recorded By, (t) = Taken By, (c) = Cosigned By    Initials Name Provider Type    Maria Alejandra Singh, PT Physical Therapist                  PT Assessment/Plan     Row Name 19 0658          PT Assessment    Assessment Comments  Pt. to benefit from bracing evaluation at [facility] to decrease L genu recurvatum and L ankle supination in standing.  Pt. has a box of braces that pt reports receiving several months ago and after looking at all braces, non are appropriate for pt to stablize her ankle or " "knee.  Pt. requires mod to max A for L LE WB activites without R UE A.  Pt. demonstrates increased B hip flexion and pt looking towards the ground.  Pt. needs bracing for L LE in order to improve L LE WB for proper alignment to improve gait.  -MW        PT Plan    PT Plan Comments  Continue with PT services to improve gait, balance, strength, transfers and overall functional mobility.  -MW       User Key  (r) = Recorded By, (t) = Taken By, (c) = Cosigned By    Initials Name Provider Type    Maria Alejandra Singh PT Physical Therapist             Exercises     Row Name 04/04/19 1115             Subjective Comments    Subjective Comments  \"I'm ready for this.\"  -MW         Subjective Pain    Able to rate subjective pain?  yes  -MW      Pre-Treatment Pain Level  0  -MW      Post-Treatment Pain Level  0  -MW         Total Minutes    04947 - PT Therapeutic Exercise Minutes  8  -MW      19669 -  PT Neuromuscular Reeducation Minutes  30  -MW         Exercise 1    Exercise Name 1  NuStep L6  -MW      Time 1  8 min  -MW      Additional Comments  R UE and B LEs with gait belt to decrease L hip ext rotation  -MW        User Key  (r) = Recorded By, (t) = Taken By, (c) = Cosigned By    Initials Name Provider Type    Maria Alejandra Singh PT Physical Therapist                          Therapy Education  Education Details: educated pt on needing L LE bracing with [facility].  Given: Symptoms/condition management, Mobility training, Posture/body mechanics              Time Calculation:   Start Time: 1120   Therapy Charges for Today     Code Description Service Date Service Provider Modifiers Qty    89709786297  PT NEUROMUSC RE EDUCATION EA 15 MIN 4/4/2019 Maria Alejandra Layton, PT GP 2    [id number]  PT THER PROC EA 15 MIN 4/4/2019 Maria Alejandra Layton, PT GP 1                    Maria Alejandra Laytno PT  4/4/2019     "

## 2019-04-18 ENCOUNTER — HOSPITAL ENCOUNTER (OUTPATIENT)
Dept: PHYSICAL THERAPY | Facility: HOSPITAL | Age: 69
Setting detail: THERAPIES SERIES
Discharge: HOME | End: 2019-04-18

## 2019-04-18 DIAGNOSIS — R26.9 GAIT DIFFICULTY: Primary | ICD-10-CM

## 2019-04-18 DIAGNOSIS — M21.862 ACQUIRED GENU RECURVATUM OF LEFT KNEE: ICD-10-CM

## 2019-04-18 PROCEDURE — 97110 THERAPEUTIC EXERCISES: CPT | Performed by: PHYSICAL THERAPIST

## 2019-04-18 PROCEDURE — 97112 NEUROMUSCULAR REEDUCATION: CPT | Performed by: PHYSICAL THERAPIST

## 2019-04-18 NOTE — THERAPY TREATMENT NOTE
"    Outpatient Physical Therapy Neuro Treatment Note  [facility]     Patient Name: Hodan Beverly  : 1950  MRN: [id number]  Today's Date: 2019      Visit Date: 2019    Visit Dx:    ICD-10-CM ICD-9-CM   1. Gait difficulty R26.9 781.2   2. Acquired genu recurvatum of left knee M21.862 736.5       Patient Active Problem List   Diagnosis   • Vaginal atrophy   • Stroke (CMS/HCC)   • Seizures (CMS/HCC)   • Menopause   • Hypertension   • Hyperlipidemia   • Depression   • Chronic constipation   • Asthma   • Senile osteoporosis           PT Neuro     Row Name 19 1100             Subjective Comments    Subjective Comments  \"I had to reschedule the appt with [facility] for Monday, I was out of town.\"  -MW         Subjective Pain    Able to rate subjective pain?  yes  -MW      Pre-Treatment Pain Level  0  -MW      Post-Treatment Pain Level  0  -MW         Balance Skills Training    50766 -  PT Neuromuscular Reeducation Minutes  37  -MW      Training Strategies (Balance)  Tall kneeling with B UE A on chair with mod A and VCing to keep chest lifted and increase L LE WB.  Tall kneeling without UE A with pt leaning her belly onto chair with pt being fearfull of LOB.  St. balance with sit to stand with R LE half on/off blue foam disc x 3 with mod A with R UE A and standing balance with R foot half on/off blue foam disc with mod A with emphasis on L LE WB with knee ext but not going into hyperextension.  Sit to stand without UE A x 3 with min/mod A with emphasis on increased L LE WB.  St. balance with equal B LE WB with R UE reaching across midline and overhead with min/mod A x 10.  St. balance with R LE stepping to blue foam disc and hold with mod/max A with emphasis on L LE WB.    -MW        User Key  (r) = Recorded By, (t) = Taken By, (c) = Cosigned By    Initials Name Provider Type    Maria Alejandra Singh, PT Physical Therapist                  PT Assessment/Plan     Row Name 19 1100    " "      PT Assessment    Assessment Comments  Pt. requires min to max A with standing balance activities with L sided neglect with all activities.  Pt. continues to demonstrate decreased R LE WB secondary to R genu recurvatum in stance with hip flexion.  Pt. to benefit from new brace for L LE to decrease hyperextension.  Pt. to benefit from skilled PT services.  -MW        PT Plan    PT Plan Comments  Continue with PT services to improve gait, balance, strength, transfes and L LE involvement with activities.  -MW       User Key  (r) = Recorded By, (t) = Taken By, (c) = Cosigned By    Initials Name Provider Type    Maria Alejandra Singh, PT Physical Therapist             Exercises     Row Name 04/18/19 1100             Subjective Comments    Subjective Comments  \"I had to reschedule the appt with [facility] for Monday, I was out of town.\"  -MW         Subjective Pain    Able to rate subjective pain?  yes  -MW      Pre-Treatment Pain Level  0  -MW      Post-Treatment Pain Level  0  -MW         Total Minutes    17062 - PT Therapeutic Exercise Minutes  8  -MW      47479 -  PT Neuromuscular Reeducation Minutes  37  -MW         Exercise 1    Exercise Name 1  NuStep L6  -MW      Time 1  8 min  -MW      Additional Comments  R UE and B LEs with gait belt around LEs to decrease L hip ext rotation  -MW        User Key  (r) = Recorded By, (t) = Taken By, (c) = Cosigned By    Initials Name Provider Type    Maria Alejandra Singh, PT Physical Therapist                          Therapy Education  Given: Symptoms/condition management, Posture/body mechanics  Program: Reinforced  How Provided: Verbal  Provided to: Patient  Level of Understanding: Verbalized, Teach back education performed              Time Calculation:   Start Time: 1115   Therapy Charges for Today     Code Description Service Date Service Provider Modifiers Qty    [id number]  PT NEUROMUSC RE EDUCATION EA 15 MIN 4/18/2019 Maria Alejandra Layton, PT GP 2    " [id number]  PT THER PROC EA 15 MIN 4/18/2019 Maria Alejandra Layton, PT GP 1                    Maria Alejandra Layton, PT  4/18/2019

## 2019-04-19 ENCOUNTER — TRANSCRIBE ORDERS (OUTPATIENT)
Dept: PHYSICAL THERAPY | Facility: HOSPITAL | Age: 69
End: 2019-04-19

## 2019-04-19 ENCOUNTER — HOSPITAL ENCOUNTER (OUTPATIENT)
Dept: OCCUPATIONAL THERAPY | Facility: HOSPITAL | Age: 69
Setting detail: THERAPIES SERIES
Discharge: HOME | End: 2019-04-19

## 2019-04-19 DIAGNOSIS — M62.81 MUSCLE WEAKNESS (GENERALIZED): Primary | ICD-10-CM

## 2019-04-19 DIAGNOSIS — Z74.09 DECREASED FUNCTIONAL MOBILITY AND ENDURANCE: Primary | ICD-10-CM

## 2019-04-19 DIAGNOSIS — Z78.9 DECREASED INDEPENDENCE WITH ACTIVITIES OF DAILY LIVING: ICD-10-CM

## 2019-04-19 PROCEDURE — 97166 OT EVAL MOD COMPLEX 45 MIN: CPT | Performed by: OCCUPATIONAL THERAPIST

## 2019-04-19 NOTE — THERAPY EVALUATION
" Outpatient Occupational Therapy Neuro Initial Evaluation  [facility]     Patient Name: Hodan Beverly  : 1950  MRN: [id number]  Today's Date: 2019      Visit Date: 2019    Patient Active Problem List   Diagnosis   • Vaginal atrophy   • Stroke (CMS/HCC)   • Seizures (CMS/HCC)   • Menopause   • Hypertension   • Hyperlipidemia   • Depression   • Chronic constipation   • Asthma   • Senile osteoporosis        Past Medical History:   Diagnosis Date   • Asthma    • Chronic constipation    • Depression    • Hyperlipidemia    • Hypertension    • Menopause    • Seizures (CMS/HCC)    • Stroke (CMS/HCC)    • Vaginal atrophy         Past Surgical History:   Procedure Laterality Date   • BREAST EXCISIONAL BIOPSY Left    • CRANIOPLASTY     • CRANIOTOMY     • HYSTERECTOMY  1988    oophorectomy, unilateral   • LUNG SURGERY Left     removal of nodule   • OOPHORECTOMY      unilateral   • UTERINE FIBROID SURGERY           Visit Dx:      ICD-10-CM ICD-9-CM   1. Decreased functional mobility and endurance Z74.09 780.99   2. Decreased independence with activities of daily living Z65.8 V62.89       Patient History     Row Name 19 1100             History    Chief Complaint  Balance Problems;Difficulty with daily activities;Muscle weakness;Other 1 (comment) impaired coordination, ROM  -EM      Date Current Problem(s) Began  12/01/15  -EM      Brief Description of Current Complaint  Pt. reports having CVA Dec 2015 and pt was inpatient [facility] for 5 weeks and then had outpatient therapy at [facility] for over 1 1/2 years.  Pt. reports then [facility].  Pt. choose [facility] secondary to distance being closure to home.  Pt. reports that she is \"stuck\" b/c she doesn't want to walk.  Pt. does not like her L double metal upright L AFO and pt reports a plateau in function.  Pt. has had her AFO for a year but feels unstable in L LE.  Pt. has a fear of " "falling. She has a sling for her L UE; however rarely uses it. She reports her L UE doesn't hurt but typically just hangs there.   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 \"Puttering queen\"  -EM         Pain     Pain Location  Back  -EM      Pain at Present  0  -EM      Pain at Best  0  -EM      Pain at Worst  6  -EM         Fall Risk Assessment    Any falls in the past year:  No pt had bad fall approx 2 years ago where she broke shoulder  -EM         Daily Activities    Primary Language  English  -EM      Are you able to read  Yes  -EM      Are you able to write  Yes  -EM      Barriers to learning  None  -EM      Pt Participated in POC and Goals  Yes  -EM         Safety    Are you being hurt, hit, or frightened by anyone at home or in your life?  No  -EM      Are you being neglected by a caregiver  No  -EM        User Key  (r) = Recorded By, (t) = Taken By, (c) = Cosigned By    Initials Name Provider Type    EM Myra Mitchell, OTR Occupational Therapist          OT Neuro     Row Name 04/19/19 1100             Home Living    Living Arrangements  house  -EM      Living Environment Comment  lives with  and house is completely accesible to her.   -EM         Vision- Basic    Current Vision  Wears glasses all the time  -EM         Sensation    Additional Comments  Some numbness that comes and goes.  -EM         General ROM    GENERAL ROM COMMENTS  ROM in R UE WFL; trace movement throughout L UE; PROM 75% throughout shoulder and 100% for elbow, wrist and digits.   -EM         MMT (Manual Muscle Testing)    General MMT Comments  R UE 4/5 gross MMT; L UE trace throughout.   -EM         ADL Assessment/Intervention    Comment, IADL Assessment/Training  Pt reports she has help with all ADL " tasks.   -EM      Additional Documentation  Comment, IADL Assessment/Training (Row)  -EM        User Key  (r) = Recorded By, (t) = Taken By, (c) = Cosigned By    Initials Name Provider Type    Myra León OTR Occupational Therapist                  Therapy Education  Education Details: role of OT and POC  Program: New  How Provided: Verbal  Provided to: Patient, Caregiver  Level of Understanding: Verbalized    OT Goals     Row Name 04/19/19 1100          OT Short Term Goals    STG Date to Achieve  05/17/19  -EM     STG 1  Pt will complete L UE AAROM/PROM w/ min A   -EM     STG 1 Progress  New  -EM     STG 2  Pt will participate in standing leisure activity for 8 mins w/o rest break  -EM     STG 2 Progress  New  -EM     STG 3  Pt will be educated in AE/adaptive technique to increase independence w/ dressing and bathing tasks  -EM     STG 3 Progress  New  -EM        Long Term Goals    LTG Date to Achieve  07/12/19  -EM     LTG 1  Pt will complete HEPs w/ [place]  -EM     LTG 1 Progress  New  -EM     LTG 2  Pt will participate in standing leisure activity for 10 mins w/o rest break  -EM     LTG 2 Progress  New  -EM     LTG 3  Patient will use AE/Adaptive technique to complete dressing tasks w/o assist  -EM     LTG 3 Progress  New  -EM        Time Calculation    OT Goal Re-Cert Due Date  07/18/19  -EM       User Key  (r) = Recorded By, (t) = Taken By, (c) = Cosigned By    Initials Name Provider Type    Myra León OTR Occupational Therapist          OT Assessment/Plan     Row Name 04/19/19 1100          OT Assessment    Functional Limitations  Decreased safety during functional activities;Impaired locomotion;Limitation in home management;Limitations in community activities;Performance in leisure activities;Limitations in functional capacity and performance;Performance in self-care ADL  -EM     Impairments  Balance;Coordination;Dexterity;Muscle strength;Sensation;Pain;Motor function;Range of motion;Poor  body mechanics;Posture  -EM     Assessment Comments  Pt presents with evolving characteristics effecting multiple factors. She requires 24 hour supervision and assist with all ADLs. Various personal factors were assessed including ROM, sensation, pain, independence w/ ADLs, strength and transfers. Pt demo decreased independence w/ ADL tasks and engaging in desired occuaptions d/t above listed impairments.   -EM     Please refer to paper survey for additional self-reported information  Yes  -EM     OT Rehab Potential  Good  -EM     Patient/caregiver participated in establishment of treatment plan and goals  Yes  -EM     Patient would benefit from skilled therapy intervention  Yes  -EM        OT Plan    OT Frequency  1x/week  -EM     Predicted Duration of Therapy Intervention (Therapy Eval)  12 visits  -EM     Planned CPT's?  OT EVAL MOD COMPLEXITY: 68503;OT THER PROC EA 15 MIN: [id number];OT SELF CARE/MGMT/TRAIN 15 MIN: 91403;OT THER ACT EA 15 MIN: 85951PO;OT NEUROMUSC RE EDUCATION EA 15 MIN: 51666  -EM     Planned Therapy Interventions (Optional Details)  balance training;home exercise program;motor coordination training;strengthening;ROM (Range of Motion);postural re-education;patient/family education;neuromuscular re-education;stretching;transfer training;other (see comments) ADL retraining  -EM     OT Plan Comments  Recommend skilled OT services to address established goals as specified.   -EM       User Key  (r) = Recorded By, (t) = Taken By, (c) = Cosigned By    Initials Name Provider Type    Myra León OTR Occupational Therapist              Outcome Measure Options: AM-PAC 6 Clicks Daily Activity (OT)  How much help from another is currently needed...  Putting on and taking off regular lower body clothing?: total  Bathing (including washing, rinsing, and drying): a lot  Toileting (which includes using toilet bed pan or urinal): a lot  Putting on and taking off regular upper body clothing: a lot  Taking  care of personal grooming (such as brushing teeth): a little  Eating meals: a little  Score: 13         Time Calculation:   OT Start Time: 1100     Therapy Charges for Today     Code Description Service Date Service Provider Modifiers Qty    08535512458  OT EVAL MOD COMPLEXITY 3 4/19/2019 Myra Mitchell, OTR GO 1                     Myra Mitchell, OTR  4/19/2019

## 2019-04-26 ENCOUNTER — APPOINTMENT (OUTPATIENT)
Dept: OCCUPATIONAL THERAPY | Facility: HOSPITAL | Age: 69
End: 2019-04-26

## 2019-04-30 ENCOUNTER — APPOINTMENT (OUTPATIENT)
Dept: PHYSICAL THERAPY | Facility: HOSPITAL | Age: 69
End: 2019-04-30

## 2019-05-01 ENCOUNTER — APPOINTMENT (OUTPATIENT)
Dept: OCCUPATIONAL THERAPY | Facility: HOSPITAL | Age: 69
End: 2019-05-01

## 2019-05-03 ENCOUNTER — HOSPITAL ENCOUNTER (OUTPATIENT)
Dept: PHYSICAL THERAPY | Facility: HOSPITAL | Age: 69
Setting detail: THERAPIES SERIES
Discharge: HOME | End: 2019-05-03

## 2019-05-03 ENCOUNTER — HOSPITAL ENCOUNTER (OUTPATIENT)
Dept: OCCUPATIONAL THERAPY | Facility: HOSPITAL | Age: 69
Setting detail: THERAPIES SERIES
Discharge: HOME | End: 2019-05-03

## 2019-05-03 DIAGNOSIS — Z78.9 DECREASED INDEPENDENCE WITH ACTIVITIES OF DAILY LIVING: ICD-10-CM

## 2019-05-03 DIAGNOSIS — Z74.09 DECREASED FUNCTIONAL MOBILITY AND ENDURANCE: Primary | ICD-10-CM

## 2019-05-03 PROCEDURE — 97112 NEUROMUSCULAR REEDUCATION: CPT | Performed by: OCCUPATIONAL THERAPIST

## 2019-05-03 PROCEDURE — 97116 GAIT TRAINING THERAPY: CPT | Performed by: PHYSICAL THERAPIST

## 2019-05-03 PROCEDURE — 97112 NEUROMUSCULAR REEDUCATION: CPT | Performed by: PHYSICAL THERAPIST

## 2019-05-03 NOTE — THERAPY TREATMENT NOTE
Outpatient Occupational Therapy Neuro Treatment Note  [facility]     Patient Name: Hodan Beverly  : 1950  MRN: [id number]  Today's Date: 5/3/2019       Visit Date: 2019    Patient Active Problem List   Diagnosis   • Vaginal atrophy   • Stroke (CMS/HCC)   • Seizures (CMS/HCC)   • Menopause   • Hypertension   • Hyperlipidemia   • Depression   • Chronic constipation   • Asthma   • Senile osteoporosis        Past Medical History:   Diagnosis Date   • Asthma    • Chronic constipation    • Depression    • Hyperlipidemia    • Hypertension    • Menopause    • Seizures (CMS/HCC)    • Stroke (CMS/HCC)    • Vaginal atrophy         Past Surgical History:   Procedure Laterality Date   • BREAST EXCISIONAL BIOPSY Left    • CRANIOPLASTY     • CRANIOTOMY     • HYSTERECTOMY  1988    oophorectomy, unilateral   • LUNG SURGERY Left     removal of nodule   • OOPHORECTOMY      unilateral   • UTERINE FIBROID SURGERY           Visit Dx:    ICD-10-CM ICD-9-CM   1. Decreased functional mobility and endurance Z74.09 780.99   2. Decreased independence with activities of daily living Z65.8 V62.89                   OT Assessment/Plan     Row Name 19 1015 19 1000       OT Assessment    Assessment Comments  Pt demo activation throughout L UE and scapula; however requires VC and tactile cues for awarness to approriate muscles.   -EM  --       OT Plan    Predicted Duration of Therapy Intervention (Therapy Eval)  --  13 visits  -MW    OT Plan Comments  contiue per POC  -EM  --      User Key  (r) = Recorded By, (t) = Taken By, (c) = Cosigned By    Initials Name Provider Type    Myra León, OTR Occupational Therapist    Maria Alejandra Singh, PT Physical Therapist              Therapy Education  Education Details: use of [employer] sling  Given: Posture/body mechanics  Program: New  How Provided: Verbal, Demonstration  Provided to: Patient  Level of Understanding: Verbalized, Demonstrated      OT Exercises     Row  Name 05/03/19 1015             Precautions    Existing Precautions/Restrictions  fall  -EM         Subjective Comments    Subjective Comments  Pt reports she didn't know her L UE could move at all.  -EM         Subjective Pain    Able to rate subjective pain?  yes  -EM      Pre-Treatment Pain Level  0  -EM      Post-Treatment Pain Level  0  -EM         Total Minutes    79258 -  OT Neuromuscular Reeducation Minutes  45  -EM         Exercise 1    Exercise Name 1  Sidelying on R side focusing on increasing use of L UE for shoulder flexion, elbow flexion/extensions, protraction/retraction on powder board.   -EM         Exercise 2    Exercise Name 2  Pt completed prone on elbows for scapular protraction and retraction w/ assist to position and sustain position on L elbow.   -EM      Sets 2  3  -EM      Reps 2  10  -EM         Exercise 3    Exercise Name 3  Pt sat EOM while reaching and WBing on L hand w/ [place]/A to sustain positioning of L hand and VC to decrease trunk rotation and compensations  -EM      Sets 3  1  -EM      Reps 3  10  -EM         Exercise 4    Exercise Name 4  Sitting EOM, pt lowered down to L elbow and pressed back up w/ [place]/A  for positioning  -EM      Sets 4  1  -EM      Reps 4  10  -EM         Exercise 5    Exercise Name 5  Stand pivot transfer using quad cane w/ min A.  -EM        User Key  (r) = Recorded By, (t) = Taken By, (c) = Cosigned By    Initials Name Provider Type    Myra León OTR Occupational Therapist                      Time Calculation:   OT Start Time: 1015     Therapy Charges for Today     Code Description Service Date Service Provider Modifiers Qty    74095913452 HC OT NEUROMUSC RE EDUCATION EA 15 MIN 5/3/2019 Myra Mitchell OTR GO 3                    KALANI Hayes  5/3/2019

## 2019-05-03 NOTE — THERAPY PROGRESS REPORT/RE-CERT
"    Outpatient Physical Therapy Neuro Progress Note  [facility]     Patient Name: Hodan Beverly  : 1950  MRN: [id number]  Today's Date: 5/3/2019      Visit Date: 2019    Visit Dx:  No diagnosis found.    Patient Active Problem List   Diagnosis   • Vaginal atrophy   • Stroke (CMS/HCC)   • Seizures (CMS/HCC)   • Menopause   • Hypertension   • Hyperlipidemia   • Depression   • Chronic constipation   • Asthma   • Senile osteoporosis           PT Neuro     Row Name 19 1000             Gait/Stairs Assessment/Training    09438 - Gait Training Minutes   10  -MW      Comment (Gait/Stairs)  Ambulation with R treking pole 50' with mod A with VCing to increase R step length for increased L LE WB with decreased leslie and VCing to lift chest and not look at the floor.  -MW         Balance Skills Training    21795 -  PT Neuromuscular Reeducation Minutes  35  -MW      Training Strategies (Balance)  Re-assessment completed, see for details.  Standing balance with R LE stepping up/down 6\" step without UE A x 10 and with R treking pole with mod/max A for L LE WB with emphasis on L knee and hip extension.  -MW        User Key  (r) = Recorded By, (t) = Taken By, (c) = Cosigned By    Initials Name Provider Type    Maria Alejandra Singh, PT Physical Therapist                  PT Assessment/Plan     Row Name 19 1000          PT Assessment    Functional Limitations  Decreased safety during functional activities;Impaired gait;Impaired locomotion;Limitations in community activities;Performance in leisure activities;Performance in self-care ADL  -MW     Impairments  Balance;Coordination;Endurance;Gait;Locomotion;Muscle strength;Sensation  -MW     Assessment Comments  Pt. did not meet any goals today but did improve gait leslie and SILVA.  Pt. demonstrates difficulty with gait with adjusted L AFO.  Pt had wedge put onto heel to decrease R genu recurvatum in stance but pt demonstrates increased L hip and knee " "flexion. Pt requires mod/max A with standing balance and pre-gait activities.  Pt. to benefit from skilled PT services to meet goals and improve functional mobility.  -MW     Please refer to paper survey for additional self-reported information  Yes  -MW     Rehab Potential  Fair  -MW     Patient/caregiver participated in establishment of treatment plan and goals  Yes  -MW     Patient would benefit from skilled therapy intervention  Yes  -MW        PT Plan    Predicted Duration of Therapy Intervention (Therapy Eval)  13 visits  -MW     Planned CPT's?  PT THER PROC EA 15 MIN: 30970;PT THER ACT EA 15 MIN: 75046;PT NEUROMUSC RE-EDUCATION EA 15 MIN: 97414;PT GAIT TRAINING EA 15 MIN: 40024;PT ELECTRICAL STIM UNATTEND: ;PT ELECTRICAL STIM ATTD EA 15 MIN: 83115  -MW     PT Plan Comments  Continue with PT services to improve gait, balance, strength, transfers and overall functional mobility.  -MW       User Key  (r) = Recorded By, (t) = Taken By, (c) = Cosigned By    Initials Name Provider Type    Maria Alejandra Singh, PT Physical Therapist             Exercises     Row Name 05/03/19 1000             Subjective Comments    Subjective Comments  \"I got my brace adjusted.  They put a heel on my brace and shoe and it's been hard to walk.\"  -MW         Subjective Pain    Able to rate subjective pain?  yes  -MW      Pre-Treatment Pain Level  0  -MW      Post-Treatment Pain Level  0  -MW         Total Minutes    39385 - Gait Training Minutes   10  -MW      14956 -  PT Neuromuscular Reeducation Minutes  35  -MW        User Key  (r) = Recorded By, (t) = Taken By, (c) = Cosigned By    Initials Name Provider Type    Maria Alejandra Singh PT Physical Therapist                      PT OP Goals     Row Name 05/03/19 0900          PT Short Term Goals    STG Date to Achieve  05/07/19  -MW     STG 1  Patient to improve SILVA balance score to >/= 20/56 to decrease client's risk of falls.  -MW     STG 1 Progress  Ongoing  -MW     STG 2  " Patient to ambulate 10 meters with AD within 60 sec with CGA without LOB for improved gait leslie and functional mobility.  -     STG 2 Progress  Ongoing  -     STG 3  Pt. to perform sit to stand with UE A from various heights and surfaces 2/2 trials without LOB with CGA to improve functional mobility.  -     STG 3 Progress  Ongoing  -     STG 4  Pt. to perform supine to/from sit with UE A as needed with A 2/2 trials to improve functional mobility.  -     STG 4 Progress  Ongoing  -     STG 5  Pt. to stand with equal B LE WB up to 1 min with R UE reaching across midline without assist for improved stability.  -[facility] 5 Progress  Ongoing  -        Long Term Goals    LTG Date to Achieve  06/18/19  -     LTG 1  Patient to improve SILVA balance score to >/= 35/56 to decrease client's risk of falls.  -     LTG 1 Progress  Ongoing  -     LTG 2  Patient to ambulate 10 meters with AD within 45 sec without LOB for improved gait leslie and functional mobility.  -     LTG 2 Progress  Ongoing  -     LTG 3  Pt. to perform sit to stand without UE A from various heights and surfaces 2/2 trials without LOB with mod I to improve functional mobility.  -     LTG 3 Progress  Ongoing  -     LTG 4  Pt. to stand without UE A up to 1 min with R UE overhead reaching activities for improved functional mobility.  -     LTG 4 Progress  Ongoing  -     LTG 5  Pt. to perform B supine to/from sit with mod I consistently for improved functional mobility.  -     LTG 5 Progress  Ongoing  -     LTG 6  Pt. to be mod I with assist from family from [facility].  -     LT 6 Progress  Ongoing  -        Time Calculation    PT Goal Re-Cert Due Date  06/24/19  -       User Key  (r) = Recorded By, (t) = Taken By, (c) = Cosigned By    Initials Name Provider Type    Maria Alejandra Singh, PT Physical Therapist          Therapy Education  Given: Symptoms/condition management, Mobility training, Posture/body  mechanics  Program: Reinforced  How Provided: Verbal  Provided to: Patient  Level of Understanding: Verbalized, Teach back education performed    Gait, Assistive Device: quad cane, other (see comments)(CGA)  10 Meter Walk Test Self-Selected Velocity  Self-Selected Velocity: Trial 1: 83.1 sec.  Goodwin Balance Scale  Sitting to Standing: needs minimal aid to stand or stabilize  Standing Unsupported: able to stand 30 seconds unsupported  Sitting with Back Unsupported but Feet Supported on Floor or on Stool: able to sit safely and securely for 2 minutes  Standing to Sitting: sits independently but has uncontrolled descent  Transfers: needs one person to assist  Standing Unsupported with Eyes Closed: able to stand 3 seconds  Standing Unsupported with Feet Together: needs help to attain position and unable to hold for 15 seconds  Reaching Forward with Outstretched Arm While Standing: reaches forward but needs supervision   Object From the Floor From a Standing Position: unable to try/needs assist to keep from losing balance or falling  Turning to Look Behind Over Left and Right Shoulders While Standing: needs supervision when turning  Turn 360 Degrees: needs assistance while turning  Place Alternate Foot on Step or Stool While Standing Unsupported: needs assistance to keep from falling/unable to try  Standing Unsupported with One Foot in Front: needs help to step but can hold 15 seconds  Standing on One Leg: unable to try of needs assist to prevent fall  Goodwin Total Score: 14         Time Calculation:   Start Time: 0930   Therapy Charges for Today     Code Description Service Date Service Provider Modifiers Qty    39568338556 HC PT NEUROMUSC RE EDUCATION EA 15 MIN 5/3/2019 Maria Alejandra Layton, PT GP 2    28122634897 HC GAIT TRAINING EA 15 MIN 5/3/2019 Maria Alejandra Layton, PT GP 1          PT G-Codes  Goodwin Total Score: 14         Maria Alejandra Layton, PT  5/3/2019

## 2019-05-06 ENCOUNTER — HOSPITAL ENCOUNTER (OUTPATIENT)
Dept: PHYSICAL THERAPY | Facility: HOSPITAL | Age: 69
Setting detail: THERAPIES SERIES
Discharge: HOME | End: 2019-05-06

## 2019-05-06 DIAGNOSIS — R26.89 BALANCE PROBLEM: ICD-10-CM

## 2019-05-06 DIAGNOSIS — R26.9 GAIT DIFFICULTY: Primary | ICD-10-CM

## 2019-05-06 DIAGNOSIS — M21.862 ACQUIRED GENU RECURVATUM OF LEFT KNEE: ICD-10-CM

## 2019-05-06 PROCEDURE — 97116 GAIT TRAINING THERAPY: CPT | Performed by: PHYSICAL THERAPIST

## 2019-05-06 PROCEDURE — 97110 THERAPEUTIC EXERCISES: CPT | Performed by: PHYSICAL THERAPIST

## 2019-05-06 NOTE — THERAPY TREATMENT NOTE
Outpatient Physical Therapy Neuro Treatment Note  [facility]     Patient Name: Hodan Beverly  : 1950  MRN: [id number]  Today's Date: 2019      Visit Date: 2019    Visit Dx:    ICD-10-CM ICD-9-CM   1. Gait difficulty R26.9 781.2   2. Balance problem R26.89 781.99   3. Acquired genu recurvatum of left knee M21.862 736.5       Patient Active Problem List   Diagnosis   • Vaginal atrophy   • Stroke (CMS/HCC)   • Seizures (CMS/HCC)   • Menopause   • Hypertension   • Hyperlipidemia   • Depression   • Chronic constipation   • Asthma   • Senile osteoporosis           PT Neuro     Row Name 19 1100             Gait/Stairs Assessment/Training    45710 - Gait Training Minutes   20  -MW      Comment (Gait/Stairs)  Ambulation with LBQC, L double metal upright AFO with min A and VCing to take smaller step L and larger step R with constant VCing to keep chest lifted 30ft, 75ft.  Pt. continues to have difficulty with not looking down towards the floor and to focus more fwd at the floor.    -MW        User Key  (r) = Recorded By, (t) = Taken By, (c) = Cosigned By    Initials Name Provider Type    Maria Alejandra Singh, PT Physical Therapist                  PT Assessment/Plan     Row Name 19 1100          PT Assessment    Assessment Comments  Pt. requires mod/max A for L LE extension on total gym.  Pt. to benefit from skilled PT services to improve gait, balance, strength, transfers and overall functional mobility.  Pt. demonstrates improved gait mechanics with VCing to take a small step with her L LE and big step with her R LE for step through gt pattern.  Pt. requires min/mod A with transfers and getting on/off the total gym.  Pt. to benefit from skilled PT services to improve functional mobility.  -MW        PT Plan    PT Plan Comments  Continue with PT services to improve gait, balance, strength, transfers and overall functional mobility.  -MW       User Key  (r) = Recorded By, (t) = Taken By,  "(c) = Cosigned By    Initials Name Provider Type    Maria Alejandra Singh, PT Physical Therapist             Exercises     Row Name 05/06/19 1100             Precautions    Existing Precautions/Restrictions  fall  -MW         Subjective Comments    Subjective Comments  \"I'm still not used to walking with my new brace.\"  -MW         Subjective Pain    Able to rate subjective pain?  yes  -MW      Pre-Treatment Pain Level  0  -MW      Post-Treatment Pain Level  0  -MW         Total Minutes    68423 - Gait Training Minutes   20  -MW      14292 - PT Therapeutic Exercise Minutes  25  -MW         Exercise 1    Exercise Name 1  NuStep L6  -MW      Time 1  10 min  -MW      Additional Comments  R UE and B LEs  -MW         Exercise 2    Exercise Name 2  Total gym L SLP  -MW      Sets 2  2  -MW      Reps 2  10  -MW      Additional Comments  mod/max A for L LE with emphasis on decreased hip external rotation  -MW         Exercise 3    Exercise Name 3  Total gym DLP  -MW      Sets 3  1  -MW      Time 3  10  -MW      Additional Comments  mod/max A for L LE alignment  -MW        User Key  (r) = Recorded By, (t) = Taken By, (c) = Cosigned By    Initials Name Provider Type    Maria Alejandra Singh, PT Physical Therapist                          Therapy Education  Given: Symptoms/condition management, Mobility training, Posture/body mechanics  Program: Reinforced  How Provided: Verbal  Provided to: Patient, Caregiver  Level of Understanding: Verbalized, Teach back education performed              Time Calculation:   Start Time: 1115   Therapy Charges for Today     Code Description Service Date Service Provider Modifiers Qty    97190812156  PT THER PROC EA 15 MIN 5/6/2019 Maria Alejandra Layton, PT GP 2    50641915272 HC GAIT TRAINING EA 15 MIN 5/6/2019 Maria Alejandra Layton PT GP 1                    Maria Alejandra Layton PT  5/6/2019     "

## 2019-05-08 ENCOUNTER — HOSPITAL ENCOUNTER (OUTPATIENT)
Dept: OCCUPATIONAL THERAPY | Facility: HOSPITAL | Age: 69
Setting detail: THERAPIES SERIES
Discharge: HOME | End: 2019-05-08

## 2019-05-08 ENCOUNTER — HOSPITAL ENCOUNTER (OUTPATIENT)
Dept: PHYSICAL THERAPY | Facility: HOSPITAL | Age: 69
Setting detail: THERAPIES SERIES
Discharge: HOME | End: 2019-05-08

## 2019-05-08 DIAGNOSIS — Z74.09 DECREASED FUNCTIONAL MOBILITY AND ENDURANCE: Primary | ICD-10-CM

## 2019-05-08 DIAGNOSIS — R26.89 BALANCE PROBLEM: ICD-10-CM

## 2019-05-08 DIAGNOSIS — Z78.9 DECREASED INDEPENDENCE WITH ACTIVITIES OF DAILY LIVING: ICD-10-CM

## 2019-05-08 DIAGNOSIS — R26.9 GAIT DIFFICULTY: Primary | ICD-10-CM

## 2019-05-08 DIAGNOSIS — M21.862 ACQUIRED GENU RECURVATUM OF LEFT KNEE: ICD-10-CM

## 2019-05-08 PROCEDURE — 97112 NEUROMUSCULAR REEDUCATION: CPT | Performed by: OCCUPATIONAL THERAPIST

## 2019-05-08 PROCEDURE — 97112 NEUROMUSCULAR REEDUCATION: CPT | Performed by: PHYSICAL THERAPIST

## 2019-05-08 NOTE — THERAPY TREATMENT NOTE
"    Outpatient Physical Therapy Neuro Treatment Note  [facility]     Patient Name: Hodan Beverly  : 1950  MRN: [id number]  Today's Date: 2019      Visit Date: 2019    Visit Dx:    ICD-10-CM ICD-9-CM   1. Gait difficulty R26.9 781.2   2. Balance problem R26.89 781.99   3. Acquired genu recurvatum of left knee M21.862 736.5       Patient Active Problem List   Diagnosis   • Vaginal atrophy   • Stroke (CMS/HCC)   • Seizures (CMS/HCC)   • Menopause   • Hypertension   • Hyperlipidemia   • Depression   • Chronic constipation   • Asthma   • Senile osteoporosis           PT Neuro     Row Name 19 08             Subjective Comments    Subjective Comments  \"I tried to walk like you taught me\"  -MW         Subjective Pain    Able to rate subjective pain?  yes  -MW      Pre-Treatment Pain Level  0  -MW      Post-Treatment Pain Level  0  -MW         Balance Skills Training    38985 -  PT Neuromuscular Reeducation Minutes  30  -MW      Training Strategies (Balance)  Co-tx with OT who focused on L UE and performed tall kneeling with R UE reaching across midline x 10, 2 sets with 2 mod/max A to get pt upright.  St. balance with treking pole in R UE and R LE step to 6\" step  and hold with 2mod/max A to get pt upright and VCing to extend L LE and keep chest lifted.  -        User Key  (r) = Recorded By, (t) = Taken By, (c) = Cosigned By    Initials Name Provider Type    Maria Alejandra Singh, PT Physical Therapist                  PT Assessment/Plan     Row Name 19 08          PT Assessment    Assessment Comments  Pt. requries 2mod/max A to get pt fully upright in both tall kneeling and with standing balance activities.  Pt. reports fear of  falling with standing more upright with L LE WB and tall kneeling.  Pt. to benefit from skilled PT services to improve gait, balance, strenth, transfers and overall functional mobility.  -MW        PT Plan    PT Plan Comments  Continue with PT services to " "improve gait, balance, strength, transfers and overall functional mobility.  -MW       User Key  (r) = Recorded By, (t) = Taken By, (c) = Cosigned By    Initials Name Provider Type    Maria Alejandra Singh PT Physical Therapist             Exercises     Row Name 05/08/19 0800 05/08/19 0800          Precautions    Existing Precautions/Restrictions  --  fall  -EM        Subjective Comments    Subjective Comments  \"I tried to walk like you taught me\"  -MW  --        Subjective Pain    Able to rate subjective pain?  yes  -MW  --     Pre-Treatment Pain Level  0  -MW  --     Post-Treatment Pain Level  0  -MW  --        Total Minutes    24858 -  PT Neuromuscular Reeducation Minutes  30  -MW  --     40377 -  OT Neuromuscular Reeducation Minutes  --  20  -EM        Exercise 1    Exercise Name 1  NuStep L6  -MW  --     Time 1  8 min  -MW  --     Additional Comments  B UE/LEs with L UE wrapped and belt keeping knees aligned  -MW  --       User Key  (r) = Recorded By, (t) = Taken By, (c) = Cosigned By    Initials Name Provider Type    EM Myra Mitchell, OTR Occupational Therapist    Maria Alejandra Singh PT Physical Therapist                          Therapy Education  Given: Symptoms/condition management, Posture/body mechanics, Mobility training  Program: Reinforced  How Provided: Verbal  Provided to: Patient  Level of Understanding: Teach back education performed              Time Calculation:   Start Time: 0800   Therapy Charges for Today     Code Description Service Date Service Provider Modifiers Qty    [id number]  PT NEUROMUSC RE EDUCATION EA 15 MIN 5/8/2019 Maria Alejandra Layton PT GP 2                    Maria Alejandra Layton PT  5/8/2019     "

## 2019-05-08 NOTE — THERAPY TREATMENT NOTE
Outpatient Occupational Therapy Neuro Treatment Note  [facility]     Patient Name: Hodan Beverly  : 1950  MRN: [id number]  Today's Date: 2019       Visit Date: 2019    Patient Active Problem List   Diagnosis   • Vaginal atrophy   • Stroke (CMS/HCC)   • Seizures (CMS/HCC)   • Menopause   • Hypertension   • Hyperlipidemia   • Depression   • Chronic constipation   • Asthma   • Senile osteoporosis        Past Medical History:   Diagnosis Date   • Asthma    • Chronic constipation    • Depression    • Hyperlipidemia    • Hypertension    • Menopause    • Seizures (CMS/HCC)    • Stroke (CMS/HCC)    • Vaginal atrophy         Past Surgical History:   Procedure Laterality Date   • BREAST EXCISIONAL BIOPSY Left    • CRANIOPLASTY     • CRANIOTOMY     • HYSTERECTOMY  1988    oophorectomy, unilateral   • LUNG SURGERY Left     removal of nodule   • OOPHORECTOMY      unilateral   • UTERINE FIBROID SURGERY           Visit Dx:    ICD-10-CM ICD-9-CM   1. Decreased functional mobility and endurance Z74.09 780.99   2. Decreased independence with activities of daily living Z65.8 V62.89                   OT Assessment/Plan     Row Name 19          OT Assessment    Assessment Comments  Pt demo fear of WSing posteriorly and onto L side requiring max verbal encouragment and tactle reinforcment. She demo increased posture following VC and encouragment.   -EM        OT Plan    OT Plan Comments  continue per POC  -EM       User Key  (r) = Recorded By, (t) = Taken By, (c) = Cosigned By    Initials Name Provider Type    Myra León, OTR Occupational Therapist              Therapy Education  Given: Posture/body mechanics  Program: Reinforced  How Provided: Verbal, Demonstration  Provided to: Patient  Level of Understanding: Verbalized, Demonstrated      OT Exercises     Row Name 19             Precautions    Existing Precautions/Restrictions  fall  -EM         Subjective Comments    Subjective  Comments  Pt reports she is uncomfortable WSing toward her L side.   -EM         Subjective Pain    Able to rate subjective pain?  yes  -EM      Pre-Treatment Pain Level  0  -EM      Post-Treatment Pain Level  0  -EM         Total Minutes    11525 -  OT Neuromuscular Reeducation Minutes  20  -EM         Exercise 1    Exercise Name 1  Pt completed tall kneeling w/ assist to position L hand into WBing while reaching above head and across midline. Assist at hips and shoulders to increase upright posture and midline.   -EM      Sets 1  2  -EM      Reps 1  10  -EM         Exercise 2    Exercise Name 2  Standing with R foot on step while WSing onto L side w/ assist for balance and positioning. Max VC for posture and assist at L shoulder and scapula to increase protraction and ER. Completed 3 times.   -EM         Exercise 3    Exercise Name 3  Giv Caro sling applied and adjusted to increase ER and WBing through L UE while standing.   -EM        User Key  (r) = Recorded By, (t) = Taken By, (c) = Cosigned By    Initials Name Provider Type    EM Myra Mitchell OTR Occupational Therapist                      Time Calculation:   OT Start Time: 0800     Therapy Charges for Today     Code Description Service Date Service Provider Modifiers Qty    [id number]  OT NEUROMUSC RE EDUCATION EA 15 MIN 5/8/2019 Myra Mitchell OTR GO 1                    KALANI Hayes  5/8/2019

## 2019-05-14 ENCOUNTER — HOSPITAL ENCOUNTER (OUTPATIENT)
Dept: OCCUPATIONAL THERAPY | Facility: HOSPITAL | Age: 69
Setting detail: THERAPIES SERIES
Discharge: HOME | End: 2019-05-14

## 2019-05-14 ENCOUNTER — HOSPITAL ENCOUNTER (OUTPATIENT)
Dept: PHYSICAL THERAPY | Facility: HOSPITAL | Age: 69
Setting detail: THERAPIES SERIES
Discharge: HOME | End: 2019-05-14

## 2019-05-14 DIAGNOSIS — R26.9 GAIT DIFFICULTY: Primary | ICD-10-CM

## 2019-05-14 DIAGNOSIS — Z74.09 DECREASED FUNCTIONAL MOBILITY AND ENDURANCE: Primary | ICD-10-CM

## 2019-05-14 DIAGNOSIS — R26.89 BALANCE PROBLEM: ICD-10-CM

## 2019-05-14 DIAGNOSIS — Z78.9 DECREASED INDEPENDENCE WITH ACTIVITIES OF DAILY LIVING: ICD-10-CM

## 2019-05-14 PROCEDURE — 97112 NEUROMUSCULAR REEDUCATION: CPT | Performed by: PHYSICAL THERAPIST

## 2019-05-14 PROCEDURE — 97112 NEUROMUSCULAR REEDUCATION: CPT | Performed by: OCCUPATIONAL THERAPIST

## 2019-05-14 NOTE — THERAPY TREATMENT NOTE
"    Outpatient Physical Therapy Neuro Treatment Note  [facility]     Patient Name: Hodna Beverly  : 1950  MRN: [id number]  Today's Date: 2019      Visit Date: 2019    Visit Dx:    ICD-10-CM ICD-9-CM   1. Gait difficulty R26.9 781.2   2. Balance problem R26.89 781.99       Patient Active Problem List   Diagnosis   • Vaginal atrophy   • Stroke (CMS/HCC)   • Seizures (CMS/HCC)   • Menopause   • Hypertension   • Hyperlipidemia   • Depression   • Chronic constipation   • Asthma   • Senile osteoporosis           PT Neuro     Row Name 19 0845             Subjective Pain    Able to rate subjective pain?  yes  -MW      Pre-Treatment Pain Level  0  -MW      Post-Treatment Pain Level  0  -MW         Balance Skills Training    31933 -  PT Neuromuscular Reeducation Minutes  15  -MW      Training Strategies (Balance)  Co-tx with OT who focused on B UE's.  Tall kneeling with R UE reaching across midline and overhead x 15 with 2 min/mod A.  Half kneeling with R foot fwd with 2mod/max A with L UE WB and R UE on pole for more upright posture.  Standing balance with L UE on treking pole and R LE stepping to 6\" step x 10, 2 sets and then holding R foot to step with 2mod/max A with emphasis on L LE WB and getting trunk more upright.  -MW        User Key  (r) = Recorded By, (t) = Taken By, (c) = Cosigned By    Initials Name Provider Type    Maria Alejandra Singh, PT Physical Therapist                  PT Assessment/Plan     Row Name 19 0845          PT Assessment    Assessment Comments  Pt. to benefit from skilled PT and OT services to improve gait, balance, strength, transfers and overall functional mobility.  Pt. educated to perform activities at home with increased R LE WB with all activities.  Pt. requries 2 mod/max A with standing and tall kneeling activities. Pt. fearful of falling and having LOB. Pt. unable to stand fully upright and unable to keep chest lifted in tall kneeling.  -MW        PT " "Plan    PT Plan Comments  Continue with PT services to improve gait, balance, strength, transfers and overall functional mobility.  -MW       User Key  (r) = Recorded By, (t) = Taken By, (c) = Cosigned By    Initials Name Provider Type    Maria Alejandra Singh, RICHAR Physical Therapist             Exercises     Row Name 05/14/19 0845             Precautions    Existing Precautions/Restrictions  fall  -MW         Subjective Comments    Subjective Comments  \"I didn't walk much.\"  -MW         Subjective Pain    Able to rate subjective pain?  yes  -MW      Pre-Treatment Pain Level  0  -MW      Post-Treatment Pain Level  0  -MW         Total Minutes    96310 -  PT Neuromuscular Reeducation Minutes  15  -MW         Exercise 1    Exercise Name 1  NuStep L6  -MW      Time 1  8 min  -MW      Additional Comments  L UE ace wrapped on, B LE's and belt around knees  -MW        User Key  (r) = Recorded By, (t) = Taken By, (c) = Cosigned By    Initials Name Provider Type    Maria Alejandra Singh PT Physical Therapist                          Therapy Education  Given: Symptoms/condition management, Posture/body mechanics  Program: Reinforced  How Provided: Verbal  Provided to: Caregiver, Patient  Level of Understanding: Verbalized, Teach back education performed              Time Calculation:   Start Time: 0845   Therapy Charges for Today     Code Description Service Date Service Provider Modifiers Qty    04342609931 HC PT NEUROMUSC RE EDUCATION EA 15 MIN 5/14/2019 Maria Alejandra Layton, PT GP 1                    Maria Alejandra Layton, PT  5/14/2019     "

## 2019-05-14 NOTE — THERAPY TREATMENT NOTE
Outpatient Occupational Therapy Neuro Treatment Note  [facility]     Patient Name: Hodan Beverly  : 1950  MRN: [id number]  Today's Date: 2019       Visit Date: 2019    Patient Active Problem List   Diagnosis   • Vaginal atrophy   • Stroke (CMS/HCC)   • Seizures (CMS/HCC)   • Menopause   • Hypertension   • Hyperlipidemia   • Depression   • Chronic constipation   • Asthma   • Senile osteoporosis        Past Medical History:   Diagnosis Date   • Asthma    • Chronic constipation    • Depression    • Hyperlipidemia    • Hypertension    • Menopause    • Seizures (CMS/HCC)    • Stroke (CMS/HCC)    • Vaginal atrophy         Past Surgical History:   Procedure Laterality Date   • BREAST EXCISIONAL BIOPSY Left    • CRANIOPLASTY     • CRANIOTOMY     • HYSTERECTOMY  1988    oophorectomy, unilateral   • LUNG SURGERY Left     removal of nodule   • OOPHORECTOMY      unilateral   • UTERINE FIBROID SURGERY           Visit Dx:    ICD-10-CM ICD-9-CM   1. Decreased functional mobility and endurance Z74.09 780.99   2. Decreased independence with activities of daily living Z65.8 V62.89                   OT Assessment/Plan     Row Name 19 0845          OT Assessment    Assessment Comments  Pt continues to demo decreased awareness to midline and limited use of L side; however activation noted throughout L UE when place in WBing positions. She demo discomfort when WSing toward L side and requires max encouragement. Good participation throughout.   -EM        OT Plan    OT Plan Comments  Continue per POC  -EM       User Key  (r) = Recorded By, (t) = Taken By, (c) = Cosigned By    Initials Name Provider Type    Myra León OTR Occupational Therapist              Therapy Education  Education Details: encouraged to focuse on midline throughout her daily tasks  Given: Posture/body mechanics, Symptoms/condition management  Program: Reinforced  How Provided: Verbal  Provided to: Caregiver, Patient  Level of  Understanding: Verbalized, Demonstrated      OT Exercises     Row Name 05/14/19 0845             Precautions    Existing Precautions/Restrictions  fall  -EM         Subjective Comments    Subjective Comments  Pt reports she is fearful of falling  -EM         Subjective Pain    Able to rate subjective pain?  yes  -EM      Pre-Treatment Pain Level  0  -EM      Post-Treatment Pain Level  0  -EM         Total Minutes    14420 -  OT Neuromuscular Reeducation Minutes  30  -EM         Exercise 1    Exercise Name 1  Pt completed tall kneeling w/ assist to position L hand into WBing while reaching above head and across midline. Assist at hips and shoulders to increase upright posture and midline. Completed half kneeling w/ trekking pole in R hand and WBing through L hand w/ [place]/A to position and WB on L hand.   -EM         Exercise 2    Exercise Name 2  Standing with R foot on step while WSing onto L side w/ assist for balance and positioning. Max VC for posture and assist at L hand to sustain  on trekking pole.  -EM        User Key  (r) = Recorded By, (t) = Taken By, (c) = Cosigned By    Initials Name Provider Type    Myra León OTR Occupational Therapist                      Time Calculation:   OT Start Time: 0845     Therapy Charges for Today     Code Description Service Date Service Provider Modifiers Qty    23817583788  OT NEUROMUSC RE EDUCATION EA 15 MIN 5/14/2019 Myra Mitchell OTR GO 2                    KALANI Hayes  5/14/2019

## 2019-05-17 ENCOUNTER — HOSPITAL ENCOUNTER (OUTPATIENT)
Dept: PHYSICAL THERAPY | Facility: HOSPITAL | Age: 69
Setting detail: THERAPIES SERIES
Discharge: HOME | End: 2019-05-17

## 2019-05-17 DIAGNOSIS — R26.89 BALANCE PROBLEM: ICD-10-CM

## 2019-05-17 DIAGNOSIS — R26.9 GAIT DIFFICULTY: Primary | ICD-10-CM

## 2019-05-17 PROCEDURE — 97112 NEUROMUSCULAR REEDUCATION: CPT | Performed by: PHYSICAL THERAPIST

## 2019-05-17 PROCEDURE — 97110 THERAPEUTIC EXERCISES: CPT | Performed by: PHYSICAL THERAPIST

## 2019-05-17 NOTE — THERAPY TREATMENT NOTE
"    Outpatient Physical Therapy Neuro Treatment Note  [facility]     Patient Name: Hodan Beverly  : 1950  MRN: [id number]  Today's Date: 2019      Visit Date: 2019    Visit Dx:    ICD-10-CM ICD-9-CM   1. Gait difficulty R26.9 781.2   2. Balance problem R26.89 781.99       Patient Active Problem List   Diagnosis   • Vaginal atrophy   • Stroke (CMS/HCC)   • Seizures (CMS/HCC)   • Menopause   • Hypertension   • Hyperlipidemia   • Depression   • Chronic constipation   • Asthma   • Senile osteoporosis           PT Neuro     Row Name 19             Subjective Comments    Subjective Comments  \"I can tell that it's starting to sink in.  I'm using my left leg more with my transfers.\"  -MW         Subjective Pain    Able to rate subjective pain?  yes  -MW      Pre-Treatment Pain Level  0  -MW      Post-Treatment Pain Level  0  -MW         Balance Skills Training    Training Strategies (Balance)  Standing with narrow [place] with R UE reaching across midline x 10 with min A for L wt shift.  Sitting balance on large swiss ball with mod A with VCing to increase L LE WB with R LE marching x 10 and R UE reaching across midline x 10 with mod A with LOB up to 60% of the time.  Standing balance with R UE or L UE A on treking pole with R LE stepping to blue disc x 5, 2 sets with min/mod A with pt holding step to blue disc with VCing for L LE wt shift.  Pt. requries three seated rest breaks secondary to fatigue.  Ambualtion with treking pole with VCing to increase L LE WB, take shorter step L LE and larger R LE step.  -MW        User Key  (r) = Recorded By, (t) = Taken By, (c) = Cosigned By    Initials Name Provider Type    Maria Alejandra Singh, PT Physical Therapist                  PT Assessment/Plan     Row Name 19          PT Assessment    Assessment Comments  Pt. requires min/mod A with standing balance activities with single UE A.  Pt. to benefit from skilled PT services to meet goals and " "improve functional mobility.  -MW        PT Plan    PT Plan Comments  Continue with PT services to improve gait, balance, strength, transfers and overall functional mobility.  -MW       User Key  (r) = Recorded By, (t) = Taken By, (c) = Cosigned By    Initials Name Provider Type    Maria Alejandra Singh, PT Physical Therapist             Exercises     Row Name 05/17/19 0900             Subjective Comments    Subjective Comments  \"I can tell that it's starting to sink in.  I'm using my left leg more with my transfers.\"  -MW         Subjective Pain    Able to rate subjective pain?  yes  -MW      Pre-Treatment Pain Level  0  -MW      Post-Treatment Pain Level  0  -MW         Total Minutes    31832 - PT Therapeutic Exercise Minutes  10  -MW      69761 -  PT Neuromuscular Reeducation Minutes  45  -MW         Exercise 1    Exercise Name 1  NuStep L6  -MW      Time 1  10 min  -MW      Additional Comments  L UE ace wrapped on, B LE's and belt around knees  -MW        User Key  (r) = Recorded By, (t) = Taken By, (c) = Cosigned By    Initials Name Provider Type    Maria Alejandra Singh, PT Physical Therapist                          Therapy Education  Education Details: educated pts  about pt preforming standing with increased L LE WB and to use L LE more with transfers  Given: Symptoms/condition management, Posture/body mechanics, Mobility training  Program: Reinforced  How Provided: Verbal  Provided to: Caregiver  Level of Understanding: Verbalized, Demonstrated, Teach back education performed              Time Calculation:   Start Time: 0905   Therapy Charges for Today     Code Description Service Date Service Provider Modifiers Qty    12549165046  PT NEUROMUSC RE EDUCATION EA 15 MIN 5/17/2019 Maria Alejandra Layton, PT GP 3    [id number]  PT THER PROC EA 15 MIN 5/17/2019 Maria Alejandra Layton, PT GP 1                    Maria Alejandra Layton PT  5/17/2019     "

## 2019-05-21 ENCOUNTER — HOSPITAL ENCOUNTER (OUTPATIENT)
Dept: PHYSICAL THERAPY | Facility: HOSPITAL | Age: 69
Setting detail: THERAPIES SERIES
Discharge: HOME | End: 2019-05-21

## 2019-05-21 ENCOUNTER — HOSPITAL ENCOUNTER (OUTPATIENT)
Dept: OCCUPATIONAL THERAPY | Facility: HOSPITAL | Age: 69
Setting detail: THERAPIES SERIES
Discharge: HOME | End: 2019-05-21

## 2019-05-21 DIAGNOSIS — R26.89 BALANCE PROBLEM: ICD-10-CM

## 2019-05-21 DIAGNOSIS — R26.9 GAIT DIFFICULTY: Primary | ICD-10-CM

## 2019-05-21 DIAGNOSIS — Z78.9 DECREASED INDEPENDENCE WITH ACTIVITIES OF DAILY LIVING: ICD-10-CM

## 2019-05-21 DIAGNOSIS — Z74.09 DECREASED FUNCTIONAL MOBILITY AND ENDURANCE: Primary | ICD-10-CM

## 2019-05-21 PROCEDURE — 97112 NEUROMUSCULAR REEDUCATION: CPT | Performed by: PHYSICAL THERAPIST

## 2019-05-21 PROCEDURE — 97112 NEUROMUSCULAR REEDUCATION: CPT | Performed by: OCCUPATIONAL THERAPIST

## 2019-05-21 PROCEDURE — 97110 THERAPEUTIC EXERCISES: CPT | Performed by: PHYSICAL THERAPIST

## 2019-05-21 NOTE — THERAPY TREATMENT NOTE
"    Outpatient Physical Therapy Neuro Treatment Note  [facility]     Patient Name: Hodan Beverly  : 1950  MRN: [id number]  Today's Date: 2019      Visit Date: 2019    Visit Dx:    ICD-10-CM ICD-9-CM   1. Gait difficulty R26.9 781.2   2. Balance problem R26.89 781.99       Patient Active Problem List   Diagnosis   • Vaginal atrophy   • Stroke (CMS/HCC)   • Seizures (CMS/HCC)   • Menopause   • Hypertension   • Hyperlipidemia   • Depression   • Chronic constipation   • Asthma   • Senile osteoporosis           PT Neuro     Row Name 1997             Subjective Comments    Subjective Comments  \"I was sore after last time.\"  -MW         Subjective Pain    Able to rate subjective pain?  yes  -MW      Pre-Treatment Pain Level  0  -MW      Post-Treatment Pain Level  0  -MW         Balance Skills Training    Training Strategies (Balance)  Co-tx with OT who focused on L UE movement and WB.  STanding balance with leaning left onto parallel bar with L UE WB through palm with shoulder/elbow flex/ext with R LE stepping to BOSU with max/total A for L LE WB and to press through L hip/knee for extension.  Pt. grabbed for R parllel bar secondary to decreased L LE WB and wt shift.  Pt. c/o back pain with standing actvitiy after 10 minutes.  Ambulation with trecking pole with min/mod A and VCing for decreased L LE step length and larger step through gt pattern with R LE.  -        User Key  (r) = Recorded By, (t) = Taken By, (c) = Cosigned By    Initials Name Provider Type    Maria Alejandra Singh, PT Physical Therapist                  PT Assessment/Plan     Row Name 19 1025          PT Assessment    Assessment Comments  Pt. requires mod/max A for L LE WB and for pt to lean onto left parllel bar.  Pt. is fearful of L LE WB and st shift in standing and requries max/total A for L LE extension in standing and for pt to not grab parllel bar with R UE.  PT. to benefit from skilled PT services to meet " "goals.  -MW        PT Plan    PT Plan Comments  Continue with PT services to improve gait, balance, strength, transfers and overall functional mobility.  -MW       User Key  (r) = Recorded By, (t) = Taken By, (c) = Cosigned By    Initials Name Provider Type    Maria Alejandra Singh, PT Physical Therapist             Exercises     Row Name 05/21/19 0930             Subjective Comments    Subjective Comments  \"I was sore after last time.\"  -MW         Subjective Pain    Able to rate subjective pain?  yes  -MW      Pre-Treatment Pain Level  0  -MW      Post-Treatment Pain Level  0  -MW         Total Minutes    72787 - PT Therapeutic Exercise Minutes  10  -MW      86364 -  PT Neuromuscular Reeducation Minutes  20  -MW         Exercise 1    Exercise Name 1  NuStep L6  -MW      Time 1  10 min  -MW      Additional Comments  L UE ace wrapped on, B LE's and belt around knees  -MW        User Key  (r) = Recorded By, (t) = Taken By, (c) = Cosigned By    Initials Name Provider Type    Maria Alejandra Singh PT Physical Therapist                          Therapy Education  Given: Symptoms/condition management, Posture/body mechanics, Mobility training  Program: Reinforced  How Provided: Verbal  Provided to: Patient  Level of Understanding: Verbalized, Teach back education performed              Time Calculation:   Start Time: 0930   Therapy Charges for Today     Code Description Service Date Service Provider Modifiers Qty    [id number]  PT NEUROMUSC RE EDUCATION EA 15 MIN 5/21/2019 Maria Alejandra Layton, PT GP 1    [id number]  PT THER PROC EA 15 MIN 5/21/2019 Maria Alejandra Layton, PT GP 1                    Maria Alejandra Layton PT  5/21/2019     "

## 2019-05-21 NOTE — THERAPY PROGRESS REPORT/RE-CERT
Outpatient Occupational Therapy Neuro Progress Note  [facility]     Patient Name: Hodan Beverly  : 1950  MRN: [id number]  Today's Date: 2019       Visit Date: 2019    Patient Active Problem List   Diagnosis   • Vaginal atrophy   • Stroke (CMS/HCC)   • Seizures (CMS/HCC)   • Menopause   • Hypertension   • Hyperlipidemia   • Depression   • Chronic constipation   • Asthma   • Senile osteoporosis        Past Medical History:   Diagnosis Date   • Asthma    • Chronic constipation    • Depression    • Hyperlipidemia    • Hypertension    • Menopause    • Seizures (CMS/HCC)    • Stroke (CMS/HCC)    • Vaginal atrophy         Past Surgical History:   Procedure Laterality Date   • BREAST EXCISIONAL BIOPSY Left    • CRANIOPLASTY     • CRANIOTOMY     • HYSTERECTOMY  1988    oophorectomy, unilateral   • LUNG SURGERY Left     removal of nodule   • OOPHORECTOMY      unilateral   • UTERINE FIBROID SURGERY           Visit Dx:    ICD-10-CM ICD-9-CM   1. Decreased functional mobility and endurance Z74.09 780.99   2. Decreased independence with activities of daily living Z65.8 V62.89       OT Neuro     Row Name 19 0930             Subjective Comments    Subjective Comments  Pt reports she has done some standing at home.   -EM         Subjective Pain    Able to rate subjective pain?  yes  -EM      Pre-Treatment Pain Level  0  -EM      Post-Treatment Pain Level  0  -EM         Sensation    Additional Comments  some numbness that comes and goes  -EM         General ROM    GENERAL ROM COMMENTS  same as initial eval  -EM         MMT (Manual Muscle Testing)    Lt Upper Ext  Lt Shoulder Extension;Lt Shoulder ABduction;Lt Elbow Extension;Lt Elbow Flexion;Lt Wrist Flexion;Lt Wrist Extension  -EM      General MMT Comments  R UE 4/5  -EM         MMT Left Upper Ext    Lt Shoulder Extension MMT, Gross Movement  (2+/5) poor plus  -EM      Lt Shoulder ABduction MMT, Gross Movement  (2/5) poor  -EM      Lt Shoulder  ADduction MMT, Gross Movement  (2+/5) poor plus  -EM      Lt Elbow Flexion MMT, Gross Movement  (2/5) poor  -EM      Lt Elbow Extension MMT, Gross Movement  (2+/5) poor plus  -EM      Lt Wrist Flexion MMT, Gross Movement  (1/5) trace  -EM      Lt Wrist Extension MMT, Gross Movement  (1/5) trace  -EM         ADL Assessment/Intervention    Comment, IADL Assessment/Training  limited changes since initial eval  -EM        User Key  (r) = Recorded By, (t) = Taken By, (c) = Cosigned By    Initials Name Provider Type    Myra León OTR Occupational Therapist                  OT Assessment/Plan     Row Name 05/21/19 0918          OT Assessment    Functional Limitations  Decreased safety during functional activities;Impaired locomotion;Limitation in home management;Limitations in community activities;Performance in leisure activities;Limitations in functional capacity and performance;Performance in self-care ADL  -EM     Impairments  Balance;Coordination;Dexterity;Muscle strength;Sensation;Pain;Motor function;Range of motion;Poor body mechanics;Posture  -EM     Assessment Comments  Pt demo slight increase in activation throughout L UE. She continues to demo impaired awareness and rarely attempts to incorporate L UE. She demo impaired awareness to midline and requires max encouragement and assist for WSing onto L side and WBing onto L UE.   -EM        OT Plan    OT Frequency  2x/week  -EM     Predicted Duration of Therapy Intervention (Therapy Eval)  8 visits  -EM     Planned CPT's?  OT EVAL MOD COMPLEXITY: 17110;OT THER PROC EA 15 MIN: 25499AO;OT SELF CARE/MGMT/TRAIN 15 MIN: 49667;OT THER ACT EA 15 MIN: 78388XN;OT NEUROMUSC RE EDUCATION EA 15 MIN: 47074  -EM     Planned Therapy Interventions (Optional Details)  balance training;home exercise program;motor coordination training;strengthening;ROM (Range of Motion);postural re-education;patient/family education;neuromuscular re-education;stretching;transfer training;other  (see comments)  -EM     OT Plan Comments  Continue per POC to further progress toward established goals. Increased to 2x per week to address L UE functional use more.  -EM       User Key  (r) = Recorded By, (t) = Taken By, (c) = Cosigned By    Initials Name Provider Type    Myra León, OTR Occupational Therapist          OT Goals     Row Name 05/21/19 0930          OT Short Term Goals    STG Date to Achieve  05/17/19  -EM     STG 1  Pt will complete L UE AAROM/PROM w/ min A   -EM     STG 1 Progress  Ongoing  -EM     STG 2  Pt will participate in standing leisure activity for 8 mins w/o rest break  -EM     STG 2 Progress  Ongoing  -EM     STG 3  Pt will be educated in AE/adaptive technique to increase independence w/ dressing and bathing tasks  -EM     STG 3 Progress  Ongoing  -EM        Long Term Goals    LTG Date to Achieve  07/12/19  -EM     LTG 1  Pt will complete HEPs w/ [place]  -EM     LTG 1 Progress  Ongoing  -EM     LTG 2  Pt will participate in standing leisure activity for 10 mins w/o rest break  -EM     LTG 2 Progress  Ongoing  -EM     LTG 3  Patient will use AE/Adaptive technique to complete dressing tasks w/o assist  -EM     LTG 3 Progress  Ongoing  -EM       User Key  (r) = Recorded By, (t) = Taken By, (c) = Cosigned By    Initials Name Provider Type    Myra León OTR Occupational Therapist          Therapy Education  Given: HEP, Posture/body mechanics  Program: Reinforced  How Provided: Verbal, Demonstration  Provided to: Patient  Level of Understanding: Verbalized, Demonstrated      OT Exercises     Row Name 05/21/19 0930             Precautions    Existing Precautions/Restrictions  fall  -EM         Total Minutes    14728 -  OT Neuromuscular Reeducation Minutes  30  -EM         Exercise 1    Exercise Name 1  Energy, strength and activation and awareness to L side  -EM      Time (Minutes) 1  nu-step at level 5 w/ ace wrap for R hand - 8 mins  -EM         Exercise 2    Exercise Name  2  Stood in // bars while WBing on L UE w/ [place]/A to position and sustain position. Pt lunged onto BOSU w/ R LE while PT managed L LE And OT managed L UE. Compelted 4x.   -EM         Exercise 3    Exercise Name 3  Pt completed sitting w/ L UE on // bar focusing on pushing and pulling w/ [place]/A  -EM      Sets 3  1  -EM      Reps 3  10  -EM         Exercise 4    Exercise Name 4  Isometric exercises completed - abduction, adduction, and extension against small ball w/ [place]/A and VC for upright posture.   -EM      Sets 4  1  -EM      Reps 4  10  -EM        User Key  (r) = Recorded By, (t) = Taken By, (c) = Cosigned By    Initials Name Provider Type    Myra León OTR Occupational Therapist            How much help from another is currently needed...  Putting on and taking off regular lower body clothing?: total  Bathing (including washing, rinsing, and drying): a lot  Toileting (which includes using toilet bed pan or urinal): a lot  Putting on and taking off regular upper body clothing: a lot  Taking care of personal grooming (such as brushing teeth): a little  Eating meals: a little  Score: 13         Time Calculation:   OT Start Time: 0930     Therapy Charges for Today     Code Description Service Date Service Provider Modifiers Qty    21464209423  OT NEUROMUSC RE EDUCATION EA 15 MIN 5/21/2019 Myra Mitchell OTR GO 2                    KALANI Hayes  5/21/2019

## 2019-05-23 ENCOUNTER — HOSPITAL ENCOUNTER (OUTPATIENT)
Dept: PHYSICAL THERAPY | Facility: HOSPITAL | Age: 69
Setting detail: THERAPIES SERIES
Discharge: HOME | End: 2019-05-23

## 2019-05-23 DIAGNOSIS — R26.89 BALANCE PROBLEM: ICD-10-CM

## 2019-05-23 DIAGNOSIS — R26.9 GAIT DIFFICULTY: Primary | ICD-10-CM

## 2019-05-23 PROCEDURE — 97530 THERAPEUTIC ACTIVITIES: CPT | Performed by: PHYSICAL THERAPIST

## 2019-05-23 PROCEDURE — 97110 THERAPEUTIC EXERCISES: CPT | Performed by: PHYSICAL THERAPIST

## 2019-05-23 PROCEDURE — 97032 APPL MODALITY 1+ESTIM EA 15: CPT | Performed by: PHYSICAL THERAPIST

## 2019-05-23 PROCEDURE — 97116 GAIT TRAINING THERAPY: CPT | Performed by: PHYSICAL THERAPIST

## 2019-05-23 NOTE — THERAPY TREATMENT NOTE
"    Outpatient Physical Therapy Neuro Treatment Note  [facility]     Patient Name: Hodan Beverly  : 1950  MRN: [id number]  Today's Date: 2019      Visit Date: 2019    Visit Dx:    ICD-10-CM ICD-9-CM   1. Gait difficulty R26.9 781.2   2. Balance problem R26.89 781.99       Patient Active Problem List   Diagnosis   • Vaginal atrophy   • Stroke (CMS/HCC)   • Seizures (CMS/HCC)   • Menopause   • Hypertension   • Hyperlipidemia   • Depression   • Chronic constipation   • Asthma   • Senile osteoporosis           PT Neuro     Row Name 19 1000 19 0800          Subjective Comments    Subjective Comments  --  \"I'm doing alright.\"  -MW        Subjective Pain    Able to rate subjective pain?  --  yes  -MW     Pre-Treatment Pain Level  --  0  -MW     Post-Treatment Pain Level  --  0  -MW        Bed Mobility Assessment/Treatment    Comment (Bed Mobility)  min/mod A for supine to/from sit.  -MW  --        Transfers    Assistive Device (Bed-Chair Transfers)  cane, quad  -MW  --     Sit-Stand [place] (Transfers)  minimum assist (75% patient effort)  -MW  --     Stand-Sit [place] (Transfers)  minimum assist (75% patient effort)  -MW  --        Gait/Stairs Assessment/Training    52835 - Gait Training Minutes   --  10  -MW     Comment (Gait/Stairs)  --  Ambulation with LBQC with min A and VCing to take a shorter step length with L LE and longer step with R LE 50' x 2.  Pt. has decreased leslie and fwd flexed posture.  -       User Key  (r) = Recorded By, (t) = Taken By, (c) = Cosigned By    Initials Name Provider Type    Maria Alejandra Singh, PT Physical Therapist                  PT Assessment/Plan     Row Name 19 0800          PT Assessment    Assessment Comments  Pt. requires min/mod A for L hamstring curl and L hip extension on powder board.  Pt. demonstrates difficulty to isolate L knee flexion and hip extension in R sidelying with pt wanting to utilize L hip flexor to " "compensate.  Pt. requires min A with gait with LBQC and VCing to keep chest lifted.  Pt. benefit from skilled PT services to improve gait, balance, strength, transfers and overall functional mobility.  -MW        PT Plan    PT Plan Comments  Continue with PT services to improve gait, balance, strength, trasnfers and overall functional mobiltiy.  -MW       User Key  (r) = Recorded By, (t) = Taken By, (c) = Cosigned By    Initials Name Provider Type    Maria Alejandra Singh, PT Physical Therapist           Modalities     Row Name 05/23/19 0800             ELECTRICAL STIMULATION    Attended/Unattended  Attended 8 min R hamstring; 8 min R glut max with min/mod A  -MW      Stimulation Type  Russian  -MW      Max mAmp  52  -MW      Location/Electrode Placement/Other  -- R sidelying with L knee flexion(hamstring), L hip ext(glut)   -MW      74617 - PT E-Stim Attended (Manual) Minutes  25  -MW        User Key  (r) = Recorded By, (t) = Taken By, (c) = Cosigned By    Initials Name Provider Type    Maria Alejandra Singh, PT Physical Therapist        Exercises     Row Name 05/23/19 0800             Subjective Comments    Subjective Comments  \"I'm doing alright.\"  -MW         Subjective Pain    Able to rate subjective pain?  yes  -MW      Pre-Treatment Pain Level  0  -MW      Post-Treatment Pain Level  0  -MW         Total Minutes    94450 - Gait Training Minutes   10  -MW      18706 - PT Therapeutic Exercise Minutes  10  -MW      42550 - PT Therapeutic Activity Minutes  10  -MW         Exercise 1    Exercise Name 1  NuStep L6  -MW      Time 1  10 min  -MW      Additional Comments  L UE ace wrapped on, B LE's and belt around knees  -MW        User Key  (r) = Recorded By, (t) = Taken By, (c) = Cosigned By    Initials Name Provider Type    Maria Alejandra Singh, PT Physical Therapist                          Therapy Education  Given: Symptoms/condition management, Posture/body mechanics, Mobility training  Program: Reinforced  How " Provided: Verbal  Provided to: Patient  Level of Understanding: Teach back education performed, Verbalized              Time Calculation:   Start Time: 0800   Therapy Charges for Today     Code Description Service Date Service Provider Modifiers Qty    11910276802  PT THER PROC EA 15 MIN 5/23/2019 Maria Alejandra Layton, PT GP 1    [id number]  GAIT TRAINING EA 15 MIN 5/23/2019 Maria Alejandra Layton, PT GP 1    [id number]  PT THERAPEUTIC ACT EA 15 MIN 5/23/2019 Maria Alejandra Layton, PT GP 1    [id number]  PT ELEC STIM EA-PER 15 MIN 5/23/2019 Maria Alejandra Layton, PT GP 1                    Maria Alejandra Layton, PT  5/23/2019

## 2019-05-29 ENCOUNTER — HOSPITAL ENCOUNTER (OUTPATIENT)
Dept: OCCUPATIONAL THERAPY | Facility: HOSPITAL | Age: 69
Setting detail: THERAPIES SERIES
Discharge: HOME | End: 2019-05-29

## 2019-05-29 DIAGNOSIS — Z74.09 DECREASED FUNCTIONAL MOBILITY AND ENDURANCE: Primary | ICD-10-CM

## 2019-05-29 DIAGNOSIS — Z78.9 DECREASED INDEPENDENCE WITH ACTIVITIES OF DAILY LIVING: ICD-10-CM

## 2019-05-29 PROCEDURE — 97112 NEUROMUSCULAR REEDUCATION: CPT | Performed by: OCCUPATIONAL THERAPIST

## 2019-05-29 PROCEDURE — 97110 THERAPEUTIC EXERCISES: CPT | Performed by: OCCUPATIONAL THERAPIST

## 2019-05-29 NOTE — THERAPY TREATMENT NOTE
Outpatient Occupational Therapy Neuro Treatment Note  [facility]     Patient Name: Hodan Beverly  : 1950  MRN: [id number]  Today's Date: 2019       Visit Date: 2019    Patient Active Problem List   Diagnosis   • Vaginal atrophy   • Stroke (CMS/HCC)   • Seizures (CMS/HCC)   • Menopause   • Hypertension   • Hyperlipidemia   • Depression   • Chronic constipation   • Asthma   • Senile osteoporosis        Past Medical History:   Diagnosis Date   • Asthma    • Chronic constipation    • Depression    • Hyperlipidemia    • Hypertension    • Menopause    • Seizures (CMS/HCC)    • Stroke (CMS/HCC)    • Vaginal atrophy         Past Surgical History:   Procedure Laterality Date   • BREAST EXCISIONAL BIOPSY Left    • CRANIOPLASTY     • CRANIOTOMY     • HYSTERECTOMY  1988    oophorectomy, unilateral   • LUNG SURGERY Left     removal of nodule   • OOPHORECTOMY      unilateral   • UTERINE FIBROID SURGERY           Visit Dx:    ICD-10-CM ICD-9-CM   1. Decreased functional mobility and endurance Z74.09 780.99   2. Decreased independence with activities of daily living Z65.8 V62.89                   OT Assessment/Plan     Row Name 19          OT Assessment    Assessment Comments  Pt demo increaesed activation throughout L UE w/ repitition of activity, visual target and slght resistance applied. She continues to be fearful of WSing toward L side, even in sitting.   -EM        OT Plan    OT Plan Comments  Continue per POC  -EM       User Key  (r) = Recorded By, (t) = Taken By, (c) = Cosigned By    Initials Name Provider Type    Myra León, OTR Occupational Therapist              Therapy Education  Given: HEP, Posture/body mechanics  Program: New, Reinforced  How Provided: Verbal, Demonstration  Provided to: Patient  Level of Understanding: Verbalized, Demonstrated      OT Exercises     Row Name 19             Precautions    Existing Precautions/Restrictions  fall  -EM          Subjective Comments    Subjective Comments  Pt reports her L LE is extra swollen today and she isn't feeling 100%  -EM         Subjective Pain    Able to rate subjective pain?  yes  -EM      Pre-Treatment Pain Level  0  -EM      Post-Treatment Pain Level  0  -EM         Total Minutes    54497 - OT Therapeutic Exercise Minutes  15  -EM      35305 -  OT Neuromuscular Reeducation Minutes  30  -EM         Exercise 1    Exercise Name 1  Energy, strength and activation and awareness to L side  -EM      Time (Minutes) 1  nu-step at level 5 w/ ace wrap for R hand - 8 mins  -EM         Exercise 2    Exercise Name 2  Pt sat EOM while OT positioned L UE into WBing position and pt completed reaching across midline to L side up high and down low. Max VC to decrease compensations. 10x each  -EM         Exercise 3    Exercise Name 3  Educated and completed AAROM/AROM of L UE on table top focusing on funcitonal reaching. Shoulder flexion, abduction and elbow flexion/ext w/ [place]/A initially and then pt able to complete (w/ difficulty) to extend elbow and flex shoulder to reach target.  -EM        User Key  (r) = Recorded By, (t) = Taken By, (c) = Cosigned By    Initials Name Provider Type    Myra León OTR Occupational Therapist                      Time Calculation:   OT Start Time: 0900     Therapy Charges for Today     Code Description Service Date Service Provider Modifiers Qty    82938649710  OT NEUROMUSC RE EDUCATION EA 15 MIN 5/29/2019 Myra Mitchlel OTR GO 2    [id number]  OT THER PROC EA 15 MIN 5/29/2019 Myra Mitchell OTR GO 1                    KALANI Hayes  5/29/2019

## 2019-05-31 ENCOUNTER — HOSPITAL ENCOUNTER (OUTPATIENT)
Dept: PHYSICAL THERAPY | Facility: HOSPITAL | Age: 69
Setting detail: THERAPIES SERIES
Discharge: HOME | End: 2019-05-31

## 2019-05-31 DIAGNOSIS — R26.89 BALANCE PROBLEM: ICD-10-CM

## 2019-05-31 DIAGNOSIS — R26.9 GAIT DIFFICULTY: Primary | ICD-10-CM

## 2019-05-31 PROCEDURE — 97112 NEUROMUSCULAR REEDUCATION: CPT | Performed by: PHYSICAL THERAPIST

## 2019-05-31 PROCEDURE — 97110 THERAPEUTIC EXERCISES: CPT | Performed by: PHYSICAL THERAPIST

## 2019-06-03 ENCOUNTER — HOSPITAL ENCOUNTER (OUTPATIENT)
Dept: PHYSICAL THERAPY | Facility: HOSPITAL | Age: 69
Setting detail: THERAPIES SERIES
Discharge: HOME | End: 2019-06-03

## 2019-06-03 DIAGNOSIS — R26.9 GAIT DIFFICULTY: Primary | ICD-10-CM

## 2019-06-03 DIAGNOSIS — R26.89 BALANCE PROBLEM: ICD-10-CM

## 2019-06-03 PROCEDURE — 97530 THERAPEUTIC ACTIVITIES: CPT | Performed by: PHYSICAL THERAPIST

## 2019-06-03 PROCEDURE — 97110 THERAPEUTIC EXERCISES: CPT | Performed by: PHYSICAL THERAPIST

## 2019-06-03 PROCEDURE — 97116 GAIT TRAINING THERAPY: CPT | Performed by: PHYSICAL THERAPIST

## 2019-06-03 NOTE — THERAPY TREATMENT NOTE
"    Outpatient Physical Therapy Neuro Treatment Note  [facility]     Patient Name: Hodan Beverly  : 1950  MRN: [id number]  Today's Date: 6/3/2019      Visit Date: 2019    Visit Dx:    ICD-10-CM ICD-9-CM   1. Gait difficulty R26.9 781.2   2. Balance problem R26.89 781.99       Patient Active Problem List   Diagnosis   • Vaginal atrophy   • Stroke (CMS/HCC)   • Seizures (CMS/HCC)   • Menopause   • Hypertension   • Hyperlipidemia   • Depression   • Chronic constipation   • Asthma   • Senile osteoporosis           PT Neuro     Row Name 19 1100             Subjective Comments    Subjective Comments  \"I had 8/10 aching pain in my L ankle and thorughout my leg.  I'm going to call [facility] about seeing if I need a new brace or shoe b/c my foot is swelling and it's hard to get my foot in my shoe.\"  -MW         Subjective Pain    Able to rate subjective pain?  yes  -MW      Pre-Treatment Pain Level  0  -MW      Post-Treatment Pain Level  0  -MW         Transfers    Comment (Transfers)  Sit to stand from inside total gym double metal upright posts without UE A with emphasis on equal B LE WB and increased B hip flexion to stand up x 5 with min A.  VCing to increase L LE WB and L wt shift.  -MW         Gait/Stairs Assessment/Training    09251 - Gait Training Minutes   10  -MW      Comment (Gait/Stairs)  Ambulation with LBQC with min A and VCing to stay looking straight ahead and not looking down towards the ground 20 ft x 2 with L double metal upright AFO with seated rest break in between.  -MW        User Key  (r) = Recorded By, (t) = Taken By, (c) = Cosigned By    Initials Name Provider Type    Maria Alejandra Singh, PT Physical Therapist                  PT Assessment/Plan     Row Name 19 1100          PT Assessment    Assessment Comments  Pt. requires min/mod A with L LE WB with sit to stand and with gait training.  Pt. educated to call [facility] to set up appt to have current " "AFO looked at for proper fitting.  Pt. to benefit from skilled PT services to meet goals and improve functional mobility.  -MW        PT Plan    PT Plan Comments  Continue with PT services to improve gait, balance, strength, transfers and overall functional mobility.  -MW       User Key  (r) = Recorded By, (t) = Taken By, (c) = Cosigned By    Initials Name Provider Type    Maria Alejandra Singh, PT Physical Therapist             Exercises     Row Name 06/03/19 1100             Subjective Comments    Subjective Comments  \"I had 8/10 aching pain in my L ankle and thorughout my leg.  I'm going to call [facility] about seeing if I need a new brace or shoe b/c my foot is swelling and it's hard to get my foot in my shoe.\"  -MW         Subjective Pain    Able to rate subjective pain?  yes  -MW      Pre-Treatment Pain Level  0  -MW      Post-Treatment Pain Level  0  -MW         Total Minutes    77072 - Gait Training Minutes   10  -MW      69985 - PT Therapeutic Exercise Minutes  35  -MW      42659 - PT Therapeutic Activity Minutes  10  -MW         Exercise 1    Exercise Name 1  NuStep L6  -MW      Time 1  10 min  -MW      Additional Comments  L UE ace wrapped on, B LE's and belt around knees  -MW         Exercise 2    Exercise Name 2  Total gym on level 10  -MW      Sets 2  2  -MW      Reps 2  10  -MW      Additional Comments  L LE stance on board with emphasis on L LE control  -MW         Exercise 3    Exercise Name 3  Total gym L LE press in small range  -MW      Sets 3  2  -MW      Reps 3  10  -MW      Additional Comments  min/mod A for L LE  -MW        User Key  (r) = Recorded By, (t) = Taken By, (c) = Cosigned By    Initials Name Provider Type    Maria Alejandra Singh, PT Physical Therapist                          Therapy Education  Given: Symptoms/condition management, Posture/body mechanics, Mobility training  Program: Reinforced  How Provided: Verbal  Provided to: Patient  Level of Understanding: Verbalized, " Teach back education performed              Time Calculation:   Start Time: 1100   Therapy Charges for Today     Code Description Service Date Service Provider Modifiers Qty    44624924636 HC PT THER PROC EA 15 MIN 6/3/2019 Maria Alejandra Layton, PT GP 2    [id number] HC GAIT TRAINING EA 15 MIN 6/3/2019 Maria Alejandra Layton, PT GP 1    [id number]  PT THERAPEUTIC ACT EA 15 MIN 6/3/2019 Maria Alejandra Layton, PT GP 1                    Maria Alejandra Layton, PT  6/3/2019

## 2019-06-05 ENCOUNTER — HOSPITAL ENCOUNTER (OUTPATIENT)
Dept: OCCUPATIONAL THERAPY | Facility: HOSPITAL | Age: 69
Setting detail: THERAPIES SERIES
Discharge: HOME | End: 2019-06-05

## 2019-06-05 ENCOUNTER — HOSPITAL ENCOUNTER (OUTPATIENT)
Dept: PHYSICAL THERAPY | Facility: HOSPITAL | Age: 69
Setting detail: THERAPIES SERIES
Discharge: HOME | End: 2019-06-05

## 2019-06-05 DIAGNOSIS — Z78.9 DECREASED INDEPENDENCE WITH ACTIVITIES OF DAILY LIVING: ICD-10-CM

## 2019-06-05 DIAGNOSIS — R26.9 GAIT DIFFICULTY: Primary | ICD-10-CM

## 2019-06-05 DIAGNOSIS — Z74.09 DECREASED FUNCTIONAL MOBILITY AND ENDURANCE: Primary | ICD-10-CM

## 2019-06-05 DIAGNOSIS — R26.89 BALANCE PROBLEM: ICD-10-CM

## 2019-06-05 PROCEDURE — 97112 NEUROMUSCULAR REEDUCATION: CPT | Performed by: PHYSICAL THERAPIST

## 2019-06-05 PROCEDURE — 97112 NEUROMUSCULAR REEDUCATION: CPT | Performed by: OCCUPATIONAL THERAPIST

## 2019-06-05 NOTE — THERAPY TREATMENT NOTE
Outpatient Occupational Therapy Neuro Treatment Note  [facility]     Patient Name: Hodan Beverly  : 1950  MRN: [id number]  Today's Date: 2019       Visit Date: 2019    Patient Active Problem List   Diagnosis   • Vaginal atrophy   • Stroke (CMS/HCC)   • Seizures (CMS/HCC)   • Menopause   • Hypertension   • Hyperlipidemia   • Depression   • Chronic constipation   • Asthma   • Senile osteoporosis        Past Medical History:   Diagnosis Date   • Asthma    • Chronic constipation    • Depression    • Hyperlipidemia    • Hypertension    • Menopause    • Seizures (CMS/HCC)    • Stroke (CMS/HCC)    • Vaginal atrophy         Past Surgical History:   Procedure Laterality Date   • BREAST EXCISIONAL BIOPSY Left    • CRANIOPLASTY     • CRANIOTOMY     • HYSTERECTOMY  1988    oophorectomy, unilateral   • LUNG SURGERY Left     removal of nodule   • OOPHORECTOMY      unilateral   • UTERINE FIBROID SURGERY           Visit Dx:    ICD-10-CM ICD-9-CM   1. Decreased functional mobility and endurance Z74.09 780.99   2. Decreased independence with activities of daily living Z65.8 V62.89                   OT Assessment/Plan     Row Name 19 1000          OT Assessment    Assessment Comments  Pt demo increased WBing throughout L side and increased upright posture following practice and repetition with VCs. She continues to be fearful of shifting weight toward her L side; however improving. She demo activation throughout her L UE when WBing and can place L UE into lap with VCs.   -EM        OT Plan    OT Plan Comments  Continue per POC  -EM       User Key  (r) = Recorded By, (t) = Taken By, (c) = Cosigned By    Initials Name Provider Type    Myra León, OTR Occupational Therapist              Therapy Education  Given: HEP, Posture/body mechanics  Program: Reinforced  How Provided: Verbal, Demonstration  Provided to: Patient  Level of Understanding: Verbalized, Demonstrated      OT Exercises     Row Name  06/05/19 1000             Precautions    Existing Precautions/Restrictions  fall  -EM         Subjective Comments    Subjective Comments  Pt reports she has an appointment about her ankle brace d/t painin L foot  -EM         Subjective Pain    Able to rate subjective pain?  yes  -EM      Pre-Treatment Pain Level  0  -EM      Post-Treatment Pain Level  0  -EM      Subjective Pain Comment  pain at times in L foot but not during therapy session  -EM         Total Minutes    37568 -  OT Neuromuscular Reeducation Minutes  15  -EM         Exercise 1    Exercise Name 1  Energy, strength and activation and awareness to L side  -EM      Time (Minutes) 1  nu-step at level 6 w/ ace wrap for R hand - 8 mins  -EM         Exercise 2    Exercise Name 2  Pt completed tall kneeling while OT facilitated WBing throughout L hand while focusing on upright posture through hips, shoulders and UB while using R UE to reach for objects and place on pegboard. Completed 20x and then repeated after short rest break while sitting and stretching fingers. Assist for posture ranged CGA - mod A  and VC for WBing evenly through L knee and L hand.   -EM        User Key  (r) = Recorded By, (t) = Taken By, (c) = Cosigned By    Initials Name Provider Type    EM Myra Mitchell OTR Occupational Therapist                      Time Calculation:   OT Start Time: 1000     Therapy Charges for Today     Code Description Service Date Service Provider Modifiers Qty    [id number] HC OT NEUROMUSC RE EDUCATION EA 15 MIN 6/5/2019 Myra Mitchell OTR GO 1                    KALANI Hayes  6/5/2019

## 2019-06-05 NOTE — THERAPY TREATMENT NOTE
"    Outpatient Physical Therapy Neuro Treatment Note  [facility]     Patient Name: Hodan Beverly  : 1950  MRN: [id number]  Today's Date: 2019      Visit Date: 2019    Visit Dx:    ICD-10-CM ICD-9-CM   1. Gait difficulty R26.9 781.2   2. Balance problem R26.89 781.99       Patient Active Problem List   Diagnosis   • Vaginal atrophy   • Stroke (CMS/HCC)   • Seizures (CMS/HCC)   • Menopause   • Hypertension   • Hyperlipidemia   • Depression   • Chronic constipation   • Asthma   • Senile osteoporosis           PT Neuro     Row Name 19 1000             Subjective Comments    Subjective Comments  \"I'm going to see [facility] after I leave here to see if I can get my brace adjusted.\"  -MW         Subjective Pain    Able to rate subjective pain?  yes  -MW      Pre-Treatment Pain Level  0  -MW      Post-Treatment Pain Level  0  -MW         Balance Skills Training    81586 -  PT Neuromuscular Reeducation Minutes  30  -MW      Training Strategies (Balance)  Co-tx with OT who focused on L UE WB through palm on chair in tall kneeling.  R UE reaching overhead and across midline x 20, 2 sets with min A with emphasis on increased L LE WB through pts knees.    -MW        User Key  (r) = Recorded By, (t) = Taken By, (c) = Cosigned By    Initials Name Provider Type    Maria Alejandra Singh, PT Physical Therapist                  PT Assessment/Plan     Row Name 19 1000          PT Assessment    Assessment Comments  Pt. requires min A with tall kneeling without UE A and requires VCing to not use R UE for balance.  Pt. progressing well towards remaining goals.  -MW        PT Plan    PT Plan Comments  Continue with PT services to improve gait, balance, strength, transffers and overall functional mobility.  -MW       User Key  (r) = Recorded By, (t) = Taken By, (c) = Cosigned By    Initials Name Provider Type    Maria Alejandra Singh, PT Physical Therapist             Exercises     Row Name 19 " "1000             Subjective Comments    Subjective Comments  \"I'm going to see [facility] after I leave here to see if I can get my brace adjusted.\"  -MW         Subjective Pain    Able to rate subjective pain?  yes  -MW      Pre-Treatment Pain Level  0  -MW      Post-Treatment Pain Level  0  -MW         Total Minutes    95498 -  PT Neuromuscular Reeducation Minutes  30  -MW         Exercise 1    Exercise Name 1  NuStep L6  -MW      Time 1  10 min  -MW      Additional Comments  L UE ace wrapped on, B LE's and belt around knees  -MW        User Key  (r) = Recorded By, (t) = Taken By, (c) = Cosigned By    Initials Name Provider Type    Maria Alejandra Singh, PT Physical Therapist                          Therapy Education  Given: Symptoms/condition management, Posture/body mechanics, Mobility training  Program: Reinforced  How Provided: Verbal  Provided to: Patient  Level of Understanding: Verbalized              Time Calculation:   Start Time: 1030   Therapy Charges for Today     Code Description Service Date Service Provider Modifiers Qty    02335415794 HC PT NEUROMUSC RE EDUCATION EA 15 MIN 6/5/2019 Maria Alejandra Layton, PT GP 2                    Maria Alejandra Layton PT  6/5/2019     "

## 2019-06-10 ENCOUNTER — HOSPITAL ENCOUNTER (OUTPATIENT)
Dept: PHYSICAL THERAPY | Facility: HOSPITAL | Age: 69
Setting detail: THERAPIES SERIES
Discharge: HOME | End: 2019-06-10

## 2019-06-10 DIAGNOSIS — R26.89 BALANCE PROBLEM: ICD-10-CM

## 2019-06-10 DIAGNOSIS — R26.9 GAIT DIFFICULTY: Primary | ICD-10-CM

## 2019-06-10 PROCEDURE — 97110 THERAPEUTIC EXERCISES: CPT | Performed by: PHYSICAL THERAPIST

## 2019-06-10 PROCEDURE — 97116 GAIT TRAINING THERAPY: CPT | Performed by: PHYSICAL THERAPIST

## 2019-06-10 NOTE — THERAPY TREATMENT NOTE
"    Outpatient Physical Therapy Neuro Treatment Note  [facility]     Patient Name: Hodan Beverly  : 1950  MRN: [id number]  Today's Date: 6/10/2019      Visit Date: 06/10/2019    Visit Dx:    ICD-10-CM ICD-9-CM   1. Gait difficulty R26.9 781.2   2. Balance problem R26.89 781.99       Patient Active Problem List   Diagnosis   • Vaginal atrophy   • Stroke (CMS/HCC)   • Seizures (CMS/HCC)   • Menopause   • Hypertension   • Hyperlipidemia   • Depression   • Chronic constipation   • Asthma   • Senile osteoporosis           PT Neuro     Row Name 06/10/19 1100             Subjective Comments    Subjective Comments  \"I went to see [facility] and they want  me to get a wider and longer shoe.  They will then take them and make them into a brace.\"  -MW         Subjective Pain    Able to rate subjective pain?  yes  -MW      Pre-Treatment Pain Level  0  -MW      Post-Treatment Pain Level  0  -MW         Gait/Stairs Assessment/Training    Comment (Gait/Stairs)  Ambulation on TM in harness with 20# removed @ 0.5 mph with L hand wrapped onto bar x 2 min, x 4 min with standing rest break in between.  Pt. requires min to max A for L LE swing phase of gait and VCing to increase R step length and perform heel strike.  Ambulation without AD with min A with VCing to keep head lifted, increase R LE step length without AD.   -        User Key  (r) = Recorded By, (t) = Taken By, (c) = Cosigned By    Initials Name Provider Type     Maria Alejandra Layton, PT Physical Therapist                  PT Assessment/Plan     Row Name 06/10/19 1100          PT Assessment    Assessment Comments  Pt. requires min to max A with gait training on TM with uplifter.  Pt. requries VCing to increase R LE step length and keep head lifted.  Pt. is fearful of falling with gait without AD.  Pt. to benefit from skilled PT services to improve gait, balance, strength and overall functional mobility.  -        PT Plan    PT Plan Comments  Continue " "with PT services to improve gait, balance, strength, transfers, and overall functional mobility.  -MW       User Key  (r) = Recorded By, (t) = Taken By, (c) = Cosigned By    Initials Name Provider Type    Maria Alejandra Singh PT Physical Therapist             Exercises     Row Name 06/10/19 1100             Subjective Comments    Subjective Comments  \"I went to see [facility] and they want  me to get a wider and longer shoe.  They will then take them and make them into a brace.\"  -MW         Subjective Pain    Able to rate subjective pain?  yes  -MW      Pre-Treatment Pain Level  0  -MW      Post-Treatment Pain Level  0  -MW         Total Minutes    49993 - Gait Training Minutes   38  -MW      16290 - PT Therapeutic Exercise Minutes  8  -MW         Exercise 1    Exercise Name 1  NuStep L6  -MW      Time 1  8 min  -MW      Additional Comments  L UE ace wrapped on, B LE's and belt around knees  -MW        User Key  (r) = Recorded By, (t) = Taken By, (c) = Cosigned By    Initials Name Provider Type    Maria Alejandra Singh PT Physical Therapist                          Therapy Education  Given: Symptoms/condition management, Posture/body mechanics, Mobility training  Program: Reinforced  How Provided: Verbal  Provided to: Patient  Level of Understanding: Verbalized              Time Calculation:   Start Time: 1115   Therapy Charges for Today     Code Description Service Date Service Provider Modifiers Qty    26301147532  PT THER PROC EA 15 MIN 6/10/2019 Maria Alejandra Layton, PT GP 1    [id number]  GAIT TRAINING EA 15 MIN 6/10/2019 Maria Alejandra Layton, PT GP 2                    Maria Alejandra Layton PT  6/10/2019     "

## 2019-06-12 ENCOUNTER — HOSPITAL ENCOUNTER (OUTPATIENT)
Dept: OCCUPATIONAL THERAPY | Facility: HOSPITAL | Age: 69
Setting detail: THERAPIES SERIES
Discharge: HOME | End: 2019-06-12

## 2019-06-12 ENCOUNTER — HOSPITAL ENCOUNTER (OUTPATIENT)
Dept: PHYSICAL THERAPY | Facility: HOSPITAL | Age: 69
Setting detail: THERAPIES SERIES
Discharge: HOME | End: 2019-06-12

## 2019-06-12 DIAGNOSIS — R26.9 GAIT DIFFICULTY: Primary | ICD-10-CM

## 2019-06-12 DIAGNOSIS — Z74.09 DECREASED FUNCTIONAL MOBILITY AND ENDURANCE: Primary | ICD-10-CM

## 2019-06-12 DIAGNOSIS — Z78.9 DECREASED INDEPENDENCE WITH ACTIVITIES OF DAILY LIVING: ICD-10-CM

## 2019-06-12 DIAGNOSIS — R26.89 BALANCE PROBLEM: ICD-10-CM

## 2019-06-12 PROCEDURE — 97112 NEUROMUSCULAR REEDUCATION: CPT | Performed by: OCCUPATIONAL THERAPIST

## 2019-06-12 PROCEDURE — 97110 THERAPEUTIC EXERCISES: CPT | Performed by: OCCUPATIONAL THERAPIST

## 2019-06-12 PROCEDURE — 97112 NEUROMUSCULAR REEDUCATION: CPT | Performed by: PHYSICAL THERAPIST

## 2019-06-12 NOTE — THERAPY TREATMENT NOTE
Outpatient Occupational Therapy Neuro Treatment Note  [facility]     Patient Name: Hodan Beverly  : 1950  MRN: [id number]  Today's Date: 2019       Visit Date: 2019    Patient Active Problem List   Diagnosis   • Vaginal atrophy   • Stroke (CMS/HCC)   • Seizures (CMS/HCC)   • Menopause   • Hypertension   • Hyperlipidemia   • Depression   • Chronic constipation   • Asthma   • Senile osteoporosis        Past Medical History:   Diagnosis Date   • Asthma    • Chronic constipation    • Depression    • Hyperlipidemia    • Hypertension    • Menopause    • Seizures (CMS/HCC)    • Stroke (CMS/HCC)    • Vaginal atrophy         Past Surgical History:   Procedure Laterality Date   • BREAST EXCISIONAL BIOPSY Left    • CRANIOPLASTY     • CRANIOTOMY     • HYSTERECTOMY  1988    oophorectomy, unilateral   • LUNG SURGERY Left     removal of nodule   • OOPHORECTOMY      unilateral   • UTERINE FIBROID SURGERY           Visit Dx:    ICD-10-CM ICD-9-CM   1. Decreased functional mobility and endurance Z74.09 780.99   2. Decreased independence with activities of daily living Z65.8 V62.89                   OT Assessment/Plan     Row Name 19 1115          OT Assessment    Assessment Comments  Pt demo good understanding of positioning of L UE into WBing postions. Increased balance and confidence while tall kneeling and increased activation in L tricep noted. Continues to require verbal cues for awareness to L UE.   -EM        OT Plan    OT Plan Comments  continue per POC  -EM       User Key  (r) = Recorded By, (t) = Taken By, (c) = Cosigned By    Initials Name Provider Type    Myra León, OTR Occupational Therapist              Therapy Education  Given: HEP, Posture/body mechanics  Program: New, Reinforced  How Provided: Verbal, Demonstration  Provided to: Patient  Level of Understanding: Verbalized, Demonstrated      OT Exercises     Row Name 19 1115             Precautions    Existing  Precautions/Restrictions  fall  -EM         Subjective Comments    Subjective Comments  Pt reports her  noticed more activation in L UE  -EM         Subjective Pain    Able to rate subjective pain?  yes  -EM      Pre-Treatment Pain Level  0  -EM      Post-Treatment Pain Level  0  -EM         Total Minutes    24635 - OT Therapeutic Exercise Minutes  15  -EM      77891 -  OT Neuromuscular Reeducation Minutes  15  -EM         Exercise 2    Exercise Name 2  Pt completed tall kneeling while WBing on L hand and using trekking pole in R hand. Focusing on extending L elbow and increasing upright posture. Pt then completed half kneeling focusing in midline and WBing in L UE. She demo activation throughout L shoulder and tricep requiring assist to sustain extended digits.   -EM         Exercise 3    Exercise Name 3  Prone on elbows, WSing R to L with assist to position L elbow under shoulder. Discomfort noted but tolerable.   -EM        User Key  (r) = Recorded By, (t) = Taken By, (c) = Cosigned By    Initials Name Provider Type    Myra León OTR Occupational Therapist                      Time Calculation:   OT Start Time: 1115     Therapy Charges for Today     Code Description Service Date Service Provider Modifiers Qty    24638522550  OT NEUROMUSC RE EDUCATION EA 15 MIN 6/12/2019 Myra Mitchell OTR GO 1    [id number]  OT THER PROC EA 15 MIN 6/12/2019 Myra Mitchell OTR GO 1                    KALANI Hayes  6/12/2019

## 2019-06-25 ENCOUNTER — HOSPITAL ENCOUNTER (OUTPATIENT)
Dept: OCCUPATIONAL THERAPY | Facility: HOSPITAL | Age: 69
Setting detail: THERAPIES SERIES
Discharge: HOME | End: 2019-06-25

## 2019-06-25 DIAGNOSIS — Z74.09 DECREASED FUNCTIONAL MOBILITY AND ENDURANCE: Primary | ICD-10-CM

## 2019-06-25 DIAGNOSIS — Z78.9 DECREASED INDEPENDENCE WITH ACTIVITIES OF DAILY LIVING: ICD-10-CM

## 2019-06-25 PROCEDURE — 97110 THERAPEUTIC EXERCISES: CPT | Performed by: OCCUPATIONAL THERAPIST

## 2019-06-25 PROCEDURE — 97112 NEUROMUSCULAR REEDUCATION: CPT | Performed by: OCCUPATIONAL THERAPIST

## 2019-06-25 NOTE — THERAPY PROGRESS REPORT/RE-CERT
Outpatient Occupational Therapy Neuro Progress Note  [facility]     Patient Name: Hodan Beverly  : 1950  MRN: [id number]  Today's Date: 2019       Visit Date: 2019    Patient Active Problem List   Diagnosis   • Vaginal atrophy   • Stroke (CMS/HCC)   • Seizures (CMS/HCC)   • Menopause   • Hypertension   • Hyperlipidemia   • Depression   • Chronic constipation   • Asthma   • Senile osteoporosis        Past Medical History:   Diagnosis Date   • Asthma    • Chronic constipation    • Depression    • Hyperlipidemia    • Hypertension    • Menopause    • Seizures (CMS/HCC)    • Stroke (CMS/HCC)    • Vaginal atrophy         Past Surgical History:   Procedure Laterality Date   • BREAST EXCISIONAL BIOPSY Left    • CRANIOPLASTY     • CRANIOTOMY     • HYSTERECTOMY  1988    oophorectomy, unilateral   • LUNG SURGERY Left     removal of nodule   • OOPHORECTOMY      unilateral   • UTERINE FIBROID SURGERY           Visit Dx:    ICD-10-CM ICD-9-CM   1. Decreased functional mobility and endurance Z74.09 780.99   2. Decreased independence with activities of daily living Z65.8 V62.89       OT Neuro     Row Name 19 0915             Subjective Comments    Subjective Comments  Pt reports she still has a lot of help with her self-care tasks.   -EM         Subjective Pain    Able to rate subjective pain?  yes  -EM      Pre-Treatment Pain Level  0  -EM      Post-Treatment Pain Level  0  -EM         Sensation    Additional Comments  same as last reassessment  -EM         General ROM    GENERAL ROM COMMENTS  ROM in R UE WFL; trace movement + throughout L UE; PROM 90% in shoulder flexion and 75% for shoulder abduction, 100% for elbow, wrist and digits.   -EM         MMT (Manual Muscle Testing)    General MMT Comments  R UE 4/5  -EM         MMT Left Upper Ext    Lt Shoulder Extension MMT, Gross Movement  (2+/5) poor plus  -EM      Lt Shoulder ABduction MMT, Gross Movement  (2/5) poor  -EM      Lt Shoulder  ADduction MMT, Gross Movement  (2+/5) poor plus  -EM      Lt Elbow Flexion MMT, Gross Movement  (2/5) poor  -EM      Lt Elbow Extension MMT, Gross Movement  (2+/5) poor plus  -EM      Lt Wrist Flexion MMT, Gross Movement  (1/5) trace  -EM      Lt Wrist Extension MMT, Gross Movement  (1/5) trace  -EM         ADL Assessment/Intervention    Comment, IADL Assessment/Training  Pt was encouraged to complete more of her UB dressing; however currently her family continues to jump in and complete.   -EM        User Key  (r) = Recorded By, (t) = Taken By, (c) = Cosigned By    Initials Name Provider Type    Myra León, OTR Occupational Therapist                  OT Assessment/Plan     Row Name 06/25/19 0915          OT Assessment    Functional Limitations  Decreased safety during functional activities;Impaired locomotion;Limitation in home management;Limitations in community activities;Performance in leisure activities;Limitations in functional capacity and performance;Performance in self-care ADL  -EM     Impairments  Balance;Coordination;Dexterity;Muscle strength;Sensation;Pain;Motor function;Range of motion;Poor body mechanics;Posture  -EM     Assessment Comments  Pt continues to require VC to attend to L UE and encouragement to attempt to move it without using R UE to help. She demo decreased fear with changes in positions and WBing through L side; however continues to require reminders to do so. Pt demo trace movement throughout L UE and slightly more when pressing against resistance; hwoever no functional use. She requires encouragement and reminders to do more for herself at home d/t super helpful family.   -EM        OT Plan    OT Frequency  2x/week  -EM     Predicted Duration of Therapy Intervention (Therapy Eval)  8 visits  -EM     OT Plan Comments  Recommend pt continue with skilled OT services to further progress toward goals as specified.   -EM       User Key  (r) = Recorded By, (t) = Taken By, (c) =  Cosigned By    Initials Name Provider Type    LYDIA Mitchell Myra TERESA, OTR Occupational Therapist          OT Goals     Row Name 06/25/19 0915          OT Short Term Goals    STG Date to Achieve  05/17/19  -EM     STG 1  Pt will complete L UE AAROM/PROM w/ min A   -EM     STG 1 Progress  Met  -EM     STG 2  Pt will participate in standing leisure activity for 8 mins w/o rest break  -EM     STG 2 Progress  Ongoing  -EM     STG 3  Pt will be educated in AE/adaptive technique to increase independence w/ dressing and bathing tasks  -EM     STG 3 Progress  Ongoing  -EM        Long Term Goals    LTG Date to Achieve  07/12/19  -EM     LTG 1  Pt will complete HEPs w/ [place]  -EM     LTG 1 Progress  Ongoing  -EM     LTG 2  Pt will participate in standing leisure activity for 10 mins w/o rest break  -EM     LTG 2 Progress  Ongoing  -EM     LTG 3  Patient will use AE/Adaptive technique to complete dressing tasks w/o assist  -EM     LTG 3 Progress  Ongoing  -EM       User Key  (r) = Recorded By, (t) = Taken By, (c) = Cosigned By    Initials Name Provider Type    Myra León TERESA, OTR Occupational Therapist          Therapy Education  Education Details: encouraged to complete UB dressing w/o assistance while sitting   Given: HEP, Posture/body mechanics  Program: Reinforced  How Provided: Verbal  Provided to: Patient  Level of Understanding: Verbalized      OT Exercises     Row Name 06/25/19 0915             Precautions    Existing Precautions/Restrictions  fall  -EM         Total Minutes    85128 - OT Therapeutic Exercise Minutes  15  -EM      44351 -  OT Neuromuscular Reeducation Minutes  25  -EM         Exercise 1    Exercise Name 1  Energy, strength and activation and awareness to L side  -EM      Time (Minutes) 1  nu-step at level 6 w/ ace wrap for R hand - 8 mins  -EM         Exercise 2    Exercise Name 2  OT facilitated PROM/AAROM throughout L UE while pt lay supine. Focusing on reaching full PROM and initiating muscle  contraction throughout L UE.   -EM         Exercise 3    Exercise Name 3  [place]/A for elbow flexion/extension, shoulder shrugs and scapular retraction  -EM      Sets 3  1  -EM      Reps 3  10  -EM         Exercise 4    Exercise Name 4  Reassessment completed.  -EM        User Key  (r) = Recorded By, (t) = Taken By, (c) = Cosigned By    Initials Name Provider Type     Myra Mitchell OTR Occupational Therapist            How much help from another is currently needed...  Putting on and taking off regular lower body clothing?: total  Bathing (including washing, rinsing, and drying): a lot  Toileting (which includes using toilet bed pan or urinal): a little  Putting on and taking off regular upper body clothing: a lot  Taking care of personal grooming (such as brushing teeth): a little  Eating meals: a little  Score: 14         Time Calculation:   OT Start Time: 0915     Therapy Charges for Today     Code Description Service Date Service Provider Modifiers Qty    69681904992  OT NEUROMUSC RE EDUCATION EA 15 MIN 6/25/2019 Myra Mitchell OTR GO 2    [id number]  OT THER PROC EA 15 MIN 6/25/2019 Myra Mitchell OTR GO 1                    AKLANI Hayes  6/25/2019

## 2019-06-28 ENCOUNTER — HOSPITAL ENCOUNTER (OUTPATIENT)
Dept: OCCUPATIONAL THERAPY | Facility: HOSPITAL | Age: 69
Setting detail: THERAPIES SERIES
Discharge: HOME | End: 2019-06-28

## 2019-06-28 DIAGNOSIS — Z78.9 DECREASED INDEPENDENCE WITH ACTIVITIES OF DAILY LIVING: ICD-10-CM

## 2019-06-28 DIAGNOSIS — Z74.09 DECREASED FUNCTIONAL MOBILITY AND ENDURANCE: Primary | ICD-10-CM

## 2019-06-28 PROCEDURE — 97110 THERAPEUTIC EXERCISES: CPT | Performed by: OCCUPATIONAL THERAPIST

## 2019-06-28 PROCEDURE — 97112 NEUROMUSCULAR REEDUCATION: CPT | Performed by: OCCUPATIONAL THERAPIST

## 2019-06-28 NOTE — THERAPY TREATMENT NOTE
Outpatient Occupational Therapy Neuro Treatment Note  [facility]     Patient Name: Hodan Beverly  : 1950  MRN: [id number]  Today's Date: 2019       Visit Date: 2019    Patient Active Problem List   Diagnosis   • Vaginal atrophy   • Stroke (CMS/HCC)   • Seizures (CMS/HCC)   • Menopause   • Hypertension   • Hyperlipidemia   • Depression   • Chronic constipation   • Asthma   • Senile osteoporosis        Past Medical History:   Diagnosis Date   • Asthma    • Chronic constipation    • Depression    • Hyperlipidemia    • Hypertension    • Menopause    • Seizures (CMS/HCC)    • Stroke (CMS/HCC)    • Vaginal atrophy         Past Surgical History:   Procedure Laterality Date   • BREAST EXCISIONAL BIOPSY Left    • CRANIOPLASTY     • CRANIOTOMY     • HYSTERECTOMY  1988    oophorectomy, unilateral   • LUNG SURGERY Left     removal of nodule   • OOPHORECTOMY      unilateral   • UTERINE FIBROID SURGERY           Visit Dx:    ICD-10-CM ICD-9-CM   1. Decreased functional mobility and endurance Z74.09 780.99   2. Decreased independence with activities of daily living Z65.8 V62.89                   OT Assessment/Plan     Row Name 19 1030          OT Assessment    Assessment Comments  Pt continues to demo hesitation with WBing on L LE and requires assist to position L UE into WBing. She requires VC and tactile cues for upright posture when standing. Activation throughout L UE and L LE when WBing.   -EM        OT Plan    OT Plan Comments  Continue per POC  -EM       User Key  (r) = Recorded By, (t) = Taken By, (c) = Cosigned By    Initials Name Provider Type    Myra León, OTR Occupational Therapist              Therapy Education  Given: HEP, Posture/body mechanics  Program: Reinforced, New  How Provided: Verbal, Demonstration  Provided to: Patient  Level of Understanding: Verbalized, Demonstrated      OT Exercises     Row Name 19 1030             Precautions    Existing  Precautions/Restrictions  fall  -EM         Subjective Comments    Subjective Comments  Pt reports she has been able to move L UE more than she ever was.  -EM         Subjective Pain    Able to rate subjective pain?  yes  -EM      Pre-Treatment Pain Level  0  -EM      Post-Treatment Pain Level  0  -EM         Total Minutes    33030 - OT Therapeutic Exercise Minutes  15  -EM      17114 -  OT Neuromuscular Reeducation Minutes  30  -EM         Exercise 1    Exercise Name 1  Energy, strength and activation and awareness to L side  -EM      Time (Minutes) 1  nu-step at level 6 w/ ace wrap for R hand - 10 mins  -EM         Exercise 2    Exercise Name 2  Pt completed sit-stand with emphasis on WBing on L LE to increase safety and standing during ADL tasks. Pt required min - mod A to encourage WS onto L side.   -EM         Exercise 3    Exercise Name 3  Pt completed standing and WSing side to side while holding trekking pole in L hand Min - mod A to encourage the WS and WBing onto L LE. She then completed heal lift with R foot to increase WBing on L side. Completed 10 x 2.   -EM        User Key  (r) = Recorded By, (t) = Taken By, (c) = Cosigned By    Initials Name Provider Type    EM Myra Mitchell OTR Occupational Therapist                      Time Calculation:   OT Start Time: 1030     Therapy Charges for Today     Code Description Service Date Service Provider Modifiers Qty    [id number]  OT NEUROMUSC RE EDUCATION EA 15 MIN 6/28/2019 Myra Mitchell OTR GO 2    [id number]  OT THER PROC EA 15 MIN 6/28/2019 Myra Mitchell OTR GO 1                    KALANI Hayes  6/28/2019

## 2019-07-09 ENCOUNTER — HOSPITAL ENCOUNTER (OUTPATIENT)
Dept: PHYSICAL THERAPY | Facility: HOSPITAL | Age: 69
Setting detail: THERAPIES SERIES
Discharge: HOME | End: 2019-07-09

## 2019-07-09 ENCOUNTER — HOSPITAL ENCOUNTER (OUTPATIENT)
Dept: OCCUPATIONAL THERAPY | Facility: HOSPITAL | Age: 69
Setting detail: THERAPIES SERIES
Discharge: HOME | End: 2019-07-09

## 2019-07-09 DIAGNOSIS — R26.89 BALANCE PROBLEM: ICD-10-CM

## 2019-07-09 DIAGNOSIS — Z74.09 DECREASED FUNCTIONAL MOBILITY AND ENDURANCE: Primary | ICD-10-CM

## 2019-07-09 DIAGNOSIS — Z78.9 DECREASED INDEPENDENCE WITH ACTIVITIES OF DAILY LIVING: ICD-10-CM

## 2019-07-09 DIAGNOSIS — R26.9 GAIT DIFFICULTY: Primary | ICD-10-CM

## 2019-07-09 PROCEDURE — 97112 NEUROMUSCULAR REEDUCATION: CPT | Performed by: PHYSICAL THERAPIST

## 2019-07-09 PROCEDURE — 97112 NEUROMUSCULAR REEDUCATION: CPT | Performed by: OCCUPATIONAL THERAPIST

## 2019-07-09 NOTE — THERAPY PROGRESS REPORT/RE-CERT
"    Outpatient Physical Therapy Neuro Progress Note   [facility]     Patient Name: Hodan Beverly  : 1950  MRN: [id number]  Today's Date: 2019      Visit Date: 2019    Visit Dx:    ICD-10-CM ICD-9-CM   1. Gait difficulty R26.9 781.2   2. Balance problem R26.89 781.99       Patient Active Problem List   Diagnosis   • Vaginal atrophy   • Stroke (CMS/HCC)   • Seizures (CMS/HCC)   • Menopause   • Hypertension   • Hyperlipidemia   • Depression   • Chronic constipation   • Asthma   • Senile osteoporosis           PT Neuro     Row Name 19 0936             Subjective Comments    Subjective Comments  \"My daughters wedding went well.  I didn't do many exercises.  I still haven't had time to get shoes so [facility] can make my new brace.\"  -MW         Subjective Pain    Able to rate subjective pain?  yes  -MW      Pre-Treatment Pain Level  0  -MW      Post-Treatment Pain Level  0  -MW         Bed Mobility Assessment/Treatment    Bed Mobility Assessment/Treatment  bed mobility (all) activities  -MW      [place] Level (Bed Mobility)  minimum assist (75% patient effort)  -MW      Bed Mobility, Safety Issues  decreased use of arms for pushing/pulling;decreased use of legs for bridging/pushing  -MW      Comment (Bed Mobility)  min/mod A for supine to/from sit  -MW         Transfers    Bed-Chair [place] (Transfers)  minimum assist (75% patient effort)  -MW      Chair-Bed [place] (Transfers)  minimum assist (75% patient effort)  -MW      Assistive Device (Bed-Chair Transfers)  cane, quad  -MW      Sit-Stand [place] (Transfers)  minimum assist (75% patient effort)  -MW      Stand-Sit [place] (Transfers)  minimum assist (75% patient effort)  -MW      Comment (Transfers)  decreased L LE WB with sit to stand  -MW         Balance Skills Training    78385 -  PT Neuromuscular Reeducation Minutes  20  -MW      Training Strategies (Balance)  Re-assessment completed, see for details.  " Co-tx with OT with tall kneeling with R UE reaching overhead and across midline x 10, walking fwd/bwd with L UE WB through walking stick with mod A for R wt shift and moving R LE.  Standing with min/mod A for midline orientation and to increase L LE WB.  -        User Key  (r) = Recorded By, (t) = Taken By, (c) = Cosigned By    Initials Name Provider Type    Maria Alejandra Singh, PT Physical Therapist                  PT Assessment/Plan     Row Name 07/09/19 0929          PT Assessment    Functional Limitations  Decreased safety during functional activities;Impaired gait;Impaired locomotion;Limitations in community activities;Performance in leisure activities;Performance in self-care ADL  -MW     Impairments  Balance;Coordination;Endurance;Gait;Locomotion;Muscle strength;Sensation  -     Assessment Comments  Pt. did not meet any goals today and did not not make any progress secondary to pt taking time off for her dtrs wedding.  Pt. requries min/mod A for L LE wt shift/WB and VCing to keep chest lifted in tall kneeling and standing.  Pt. to benefit from additional therapy services to improve gait, balance, strength, transfes and overall functional mobility.  -MW     Please refer to paper survey for additional self-reported information  Yes  -MW     Rehab Potential  Fair  -MW     Patient/caregiver participated in establishment of treatment plan and goals  Yes  -MW     Patient would benefit from skilled therapy intervention  Yes  -MW        PT Plan    PT Frequency  2x/week  -MW     Predicted Duration of Therapy Intervention (Therapy Eval)  12 visits  -MW     Planned CPT's?  PT THER PROC EA 15 MIN: 61533;PT THER ACT EA 15 MIN: 90993;PT NEUROMUSC RE-EDUCATION EA 15 MIN: 21628;PT GAIT TRAINING EA 15 MIN: 33313;PT ELECTRICAL STIM UNATTEND: ;PT ELECTRICAL STIM ATTD EA 15 MIN: 56559  -MW     PT Plan Comments  Continue with PT services to improve gait, balance, strength, transfers and overall functional mobility.   "-MW       User Key  (r) = Recorded By, (t) = Taken By, (c) = Cosigned By    Initials Name Provider Type    Maria Alejandra Singh, PT Physical Therapist             Exercises     Row Name 07/09/19 0930             Subjective Comments    Subjective Comments  \"My daughters wedding went well.  I didn't do many exercises.  I still haven't had time to get shoes so [facility] can make my new brace.\"  -MW         Subjective Pain    Able to rate subjective pain?  yes  -MW      Pre-Treatment Pain Level  0  -MW      Post-Treatment Pain Level  0  -MW         Total Minutes    29840 -  PT Neuromuscular Reeducation Minutes  20  -MW        User Key  (r) = Recorded By, (t) = Taken By, (c) = Cosigned By    Initials Name Provider Type    Maria Alejandra Singh, PT Physical Therapist                      PT OP Goals     Row Name 07/09/19 0930          PT Short Term Goals    STG Date to Achieve  08/20/19  -MW     STG 1  Patient to improve SILVA balance score to >/= 20/56 to decrease client's risk of falls.  -MW     STG 1 Progress  Met  -     STG 2  Patient to ambulate 10 meters with AD within 60 sec with CGA without LOB for improved gait leslie and functional mobility.  -     STG 2 Progress  Ongoing  -     STG 3  Pt. to perform sit to stand with UE A from various heights and surfaces 2/2 trials without LOB with CGA to improve functional mobility.  -     STG 3 Progress  Ongoing  -     STG 4  Pt. to perform supine to/from sit with UE A as needed with A 2/2 trials to improve functional mobility.  -     STG 4 Progress  Ongoing  -     STG 5  Pt. to stand with equal B LE WB up to 1 min with R UE reaching across midline without assist for improved stability.  -     STG 5 Progress  Ongoing  -MW        Long Term Goals    LTG Date to Achieve  10/01/19  -MW     LTG 1  Patient to improve SILVA balance score to >/= 35/56 to decrease client's risk of falls.  -MW     LTG 1 Progress  Ongoing  -     LTG 2  Patient to ambulate 10 " meters with AD within 45 sec without LOB for improved gait leslie and functional mobility.  -     LTG 2 Progress  Ongoing  -MW     LTG 3  Pt. to perform sit to stand without UE A from various heights and surfaces 2/2 trials without LOB with mod I to improve functional mobility.  -MW     LTG 3 Progress  Ongoing  -MW     LTG 4  Pt. to stand without UE A up to 1 min with R UE overhead reaching activities for improved functional mobility.  -MW     LTG 4 Progress  Ongoing  -MW     LTG 5  Pt. to perform B supine to/from sit with mod I consistently for improved functional mobility.  -MW     LTG 5 Progress  Ongoing  -MW     LTG 6  Pt. to be mod I with assist from family from [facility].  -     LTG 6 Progress  Ongoing  -MW        Time Calculation    PT Goal Re-Cert Due Date  10/07/19  -       User Key  (r) = Recorded By, (t) = Taken By, (c) = Cosigned By    Initials Name Provider Type    Maria Alejandra Singh, PT Physical Therapist          Therapy Education  Given: Symptoms/condition management, Posture/body mechanics  Program: Reinforced  How Provided: Verbal  Provided to: Patient  Level of Understanding: Verbalized, Teach back education performed    Goodwin Balance Scale  Sitting to Standing: able to stand using hands after several tries  Standing Unsupported: able to stand 2 minutes with supervision  Sitting with Back Unsupported but Feet Supported on Floor or on Stool: able to sit safely and securely for 2 minutes  Standing to Sitting: uses back of legs against chair to control descent  Transfers: able to transfer with verbal cuing and/or supervision  Standing Unsupported with Eyes Closed: able to stand 10 seconds with supervision  Standing Unsupported with Feet Together: needs help to attain position but able to stand 15 seconds feet together  Reaching Forward with Outstretched Arm While Standing: reaches forward but needs supervision   Object From the Floor From a Standing Position: unable to try/needs assist to  keep from losing balance or falling  Turning to Look Behind Over Left and Right Shoulders While Standing: needs supervision when turning  Turn 360 Degrees: needs assistance while turning  Place Alternate Foot on Step or Stool While Standing Unsupported: needs assistance to keep from falling/unable to try  Standing Unsupported with One Foot in Front: needs help to step but can hold 15 seconds  Standing on One Leg: tries to lift leg unable to hold 3 seconds but remains standing independently  Goodwin Total Score: 21         Time Calculation:   Start Time: 0930   Therapy Charges for Today     Code Description Service Date Service Provider Modifiers Qty    68865096873 HC PT NEUROMUSC RE EDUCATION EA 15 MIN 7/9/2019 Maria Alejandra Layton, PT GP 1          PT G-Codes  Goodwin Total Score: 21         Maria Alejandra Layton, PT  7/9/2019

## 2019-07-09 NOTE — THERAPY TREATMENT NOTE
Outpatient Occupational Therapy Neuro Treatment Note  [facility]     Patient Name: Hodan Beverly  : 1950  MRN: [id number]  Today's Date: 2019       Visit Date: 2019    Patient Active Problem List   Diagnosis   • Vaginal atrophy   • Stroke (CMS/HCC)   • Seizures (CMS/HCC)   • Menopause   • Hypertension   • Hyperlipidemia   • Depression   • Chronic constipation   • Asthma   • Senile osteoporosis        Past Medical History:   Diagnosis Date   • Asthma    • Chronic constipation    • Depression    • Hyperlipidemia    • Hypertension    • Menopause    • Seizures (CMS/HCC)    • Stroke (CMS/HCC)    • Vaginal atrophy         Past Surgical History:   Procedure Laterality Date   • BREAST EXCISIONAL BIOPSY Left    • CRANIOPLASTY     • CRANIOTOMY     • HYSTERECTOMY  1988    oophorectomy, unilateral   • LUNG SURGERY Left     removal of nodule   • OOPHORECTOMY      unilateral   • UTERINE FIBROID SURGERY           Visit Dx:    ICD-10-CM ICD-9-CM   1. Decreased functional mobility and endurance Z74.09 780.99   2. Decreased independence with activities of daily living Z65.8 V62.89                   OT Assessment/Plan     Row Name 19       OT Assessment    Assessment Comments  --  Pt demo dizziness with sudden movements from supine to EOM. She continues to demo compensations and refrains from WBing through L LE and requires VC reminders.   -EM       OT Plan    Predicted Duration of Therapy Intervention (Therapy Eval)  12 visits  -  --    OT Plan Comments  --  continue per POC  -EM      User Key  (r) = Recorded By, (t) = Taken By, (c) = Cosigned By    Initials Name Provider Type    Myra León, OTR Occupational Therapist    Maria Alejandra Singh, PT Physical Therapist              Therapy Education  Given: Posture/body mechanics  Program: New, Reinforced  How Provided: Verbal, Demonstration  Provided to: Patient  Level of Understanding: Verbalized, Demonstrated      OT Exercises      Row Name 07/09/19 0930             Precautions    Existing Precautions/Restrictions  fall  -EM         Subjective Comments    Subjective Comments  Pt reports she feels like this has been beneficial for her  -EM         Subjective Pain    Able to rate subjective pain?  yes  -EM      Pre-Treatment Pain Level  0  -EM      Post-Treatment Pain Level  0  -EM         Total Minutes    57669 -  OT Neuromuscular Reeducation Minutes  15  -EM         Exercise 2    Exercise Name 2  Pt completed tall kneeling while WSing and reaching across midline with VC and tactile cues to WB through L UE and L LE while reaching.   -EM         Exercise 3    Exercise Name 3  Pt was mod A for supine to sitting at EOM then became dizzy and had to sit from sometime while WBing on L UE with assist to position.   -EM        User Key  (r) = Recorded By, (t) = Taken By, (c) = Cosigned By    Initials Name Provider Type    Myra León OTR Occupational Therapist                      Time Calculation:   OT Start Time: 0930     Therapy Charges for Today     Code Description Service Date Service Provider Modifiers Qty    05446950275 HC OT NEUROMUSC RE EDUCATION EA 15 MIN 7/9/2019 Myra Mitchell OTR GO 1                    KALANI Hayes  7/9/2019

## 2019-07-15 ENCOUNTER — HOSPITAL ENCOUNTER (OUTPATIENT)
Dept: PHYSICAL THERAPY | Facility: HOSPITAL | Age: 69
Setting detail: THERAPIES SERIES
Discharge: HOME | End: 2019-07-15

## 2019-07-15 DIAGNOSIS — R26.89 BALANCE PROBLEM: ICD-10-CM

## 2019-07-15 DIAGNOSIS — R26.9 GAIT DIFFICULTY: Primary | ICD-10-CM

## 2019-07-15 PROCEDURE — 97110 THERAPEUTIC EXERCISES: CPT | Performed by: PHYSICAL THERAPIST

## 2019-07-15 PROCEDURE — 97112 NEUROMUSCULAR REEDUCATION: CPT | Performed by: PHYSICAL THERAPIST

## 2019-07-15 PROCEDURE — 97116 GAIT TRAINING THERAPY: CPT | Performed by: PHYSICAL THERAPIST

## 2019-07-15 NOTE — THERAPY TREATMENT NOTE
"    Outpatient Physical Therapy Neuro Treatment Note  [facility]     Patient Name: Hodan Beverly  : 1950  MRN: [id number]  Today's Date: 7/15/2019      Visit Date: 07/15/2019    Visit Dx:    ICD-10-CM ICD-9-CM   1. Gait difficulty R26.9 781.2   2. Balance problem R26.89 781.99       Patient Active Problem List   Diagnosis   • Vaginal atrophy   • Stroke (CMS/HCC)   • Seizures (CMS/HCC)   • Menopause   • Hypertension   • Hyperlipidemia   • Depression   • Chronic constipation   • Asthma   • Senile osteoporosis           PT Neuro     Row Name 07/15/19 0945             Subjective Comments    Subjective Comments  \"I'm sorry I had to cancel last week, I had something going on.\"  -MW         Subjective Pain    Able to rate subjective pain?  yes  -MW      Pre-Treatment Pain Level  0  -MW      Post-Treatment Pain Level  0  -MW         Transfers    Sit-Stand [place] (Transfers)  minimum assist (75% patient effort)  -MW      Stand-Sit [place] (Transfers)  minimum assist (75% patient effort)  -MW      Comment (Transfers)  L UE A with stand pivot transfer  -MW         Gait/Stairs Assessment/Training    80371 - Gait Training Minutes   15  -MW      Comment (Gait/Stairs)  Ambulation on TM with L UE wrapped for UE support and in harness with 20# removed for 2 min, 2 sets with mod A for L LE swing phase and VCing for L LE stance phase, keep chest upright, and larger R LE step length.    -MW         Balance Skills Training    19624 -  PT Neuromuscular Reeducation Minutes  15  -MW      Training Strategies (Balance)  St. balance in harness on TM without UE A with L LE WB and R LE on 6\" step and hold with R UE reaching across midline x 10 with mod A.  Pt. requries VCing to increase L hip ext with upright trunk.  -MW        User Key  (r) = Recorded By, (t) = Taken By, (c) = Cosigned By    Initials Name Provider Type    Maria Alejandra Singh, PT Physical Therapist                  PT Assessment/Plan     Row Name " "07/15/19 0930          PT Assessment    Assessment Comments  Pt. requires mod A with ambulation on Tm in harness with 20# removed.  Pt. requries assist for L LE swing clearance, upright trunk and increased R LE step length.  Pt. required standing rest break in between.  Pt. fearful of performing activities without UE A.  Pt. to benefit from skilled PT services to meet goals and improve functional mobility.  -MW        PT Plan    PT Plan Comments  Continue with PT services to improve gait, balance, strength, transfers and overall functional mobility.  -MW       User Key  (r) = Recorded By, (t) = Taken By, (c) = Cosigned By    Initials Name Provider Type    Maria Alejandra Snigh, PT Physical Therapist             Exercises     Row Name 07/15/19 0945             Subjective Comments    Subjective Comments  \"I'm sorry I had to cancel last week, I had something going on.\"  -MW         Subjective Pain    Able to rate subjective pain?  yes  -MW      Pre-Treatment Pain Level  0  -MW      Post-Treatment Pain Level  0  -MW         Total Minutes    93441 - Gait Training Minutes   15  -MW      73351 - PT Therapeutic Exercise Minutes  10  -MW      71127 -  PT Neuromuscular Reeducation Minutes  15  -MW         Exercise 1    Exercise Name 1  NuStep L6  -MW      Time 1  8 min  -MW      Additional Comments  L UE ace wrapped on, B LE's and belt around knees  -MW        User Key  (r) = Recorded By, (t) = Taken By, (c) = Cosigned By    Initials Name Provider Type    Maria Alejandra Singh, PT Physical Therapist                          Therapy Education  Given: Posture/body mechanics, Mobility training  Program: Reinforced  How Provided: Verbal  Provided to: Patient, Caregiver  Level of Understanding: Verbalized, Demonstrated              Time Calculation:   Start Time: 0930   Therapy Charges for Today     Code Description Service Date Service Provider Modifiers Qty    95129375612  PT NEUROMUSC RE EDUCATION EA 15 MIN 7/15/2019 Calin, " Maria Alejandra MOORE, PT GP 1    76057169440 HC PT THER PROC EA 15 MIN 7/15/2019 Maria Alejandra Layton, PT GP 1    90408413588 HC GAIT TRAINING EA 15 MIN 7/15/2019 Maria Alejandra Layton, PT GP 1                    Maria Alejandra Layton, PT  7/15/2019

## 2019-07-16 ENCOUNTER — HOSPITAL ENCOUNTER (OUTPATIENT)
Dept: OCCUPATIONAL THERAPY | Facility: HOSPITAL | Age: 69
Setting detail: THERAPIES SERIES
Discharge: HOME | End: 2019-07-16

## 2019-07-16 ENCOUNTER — HOSPITAL ENCOUNTER (OUTPATIENT)
Dept: PHYSICAL THERAPY | Facility: HOSPITAL | Age: 69
Setting detail: THERAPIES SERIES
Discharge: HOME | End: 2019-07-16

## 2019-07-16 DIAGNOSIS — R26.9 GAIT DIFFICULTY: Primary | ICD-10-CM

## 2019-07-16 DIAGNOSIS — R26.89 BALANCE PROBLEM: ICD-10-CM

## 2019-07-16 DIAGNOSIS — Z78.9 DECREASED INDEPENDENCE WITH ACTIVITIES OF DAILY LIVING: ICD-10-CM

## 2019-07-16 DIAGNOSIS — Z74.09 DECREASED FUNCTIONAL MOBILITY AND ENDURANCE: Primary | ICD-10-CM

## 2019-07-16 PROCEDURE — 97112 NEUROMUSCULAR REEDUCATION: CPT | Performed by: OCCUPATIONAL THERAPIST

## 2019-07-16 PROCEDURE — 97110 THERAPEUTIC EXERCISES: CPT | Performed by: OCCUPATIONAL THERAPIST

## 2019-07-16 PROCEDURE — 97112 NEUROMUSCULAR REEDUCATION: CPT | Performed by: PHYSICAL THERAPIST

## 2019-07-16 NOTE — THERAPY PROGRESS REPORT/RE-CERT
Outpatient Occupational Therapy Neuro Progress Note  [facility]     Patient Name: Hodan Beverly  : 1950  MRN: [id number]  Today's Date: 2019       Visit Date: 2019    Patient Active Problem List   Diagnosis   • Vaginal atrophy   • Stroke (CMS/HCC)   • Seizures (CMS/HCC)   • Menopause   • Hypertension   • Hyperlipidemia   • Depression   • Chronic constipation   • Asthma   • Senile osteoporosis        Past Medical History:   Diagnosis Date   • Asthma    • Chronic constipation    • Depression    • Hyperlipidemia    • Hypertension    • Menopause    • Seizures (CMS/HCC)    • Stroke (CMS/HCC)    • Vaginal atrophy         Past Surgical History:   Procedure Laterality Date   • BREAST EXCISIONAL BIOPSY Left    • CRANIOPLASTY     • CRANIOTOMY     • HYSTERECTOMY  1988    oophorectomy, unilateral   • LUNG SURGERY Left     removal of nodule   • OOPHORECTOMY      unilateral   • UTERINE FIBROID SURGERY           Visit Dx:    ICD-10-CM ICD-9-CM   1. Decreased functional mobility and endurance Z74.09 780.99   2. Decreased independence with activities of daily living Z65.8 V62.89       OT Neuro     Row Name 19 0930             Subjective Comments    Subjective Comments  Pt reports she continues to notice a little more movement in her L arm.  -EM         Subjective Pain    Able to rate subjective pain?  yes  -EM      Pre-Treatment Pain Level  0  -EM      Post-Treatment Pain Level  0  -EM         Sensation    Additional Comments  Pt denies numbness  -EM         General ROM    GENERAL ROM COMMENTS  ROM in R UE WFL; L UE same as previous  -EM         MMT (Manual Muscle Testing)    General MMT Comments  R UE 4+/5  -EM         MMT Left Upper Ext    Lt Shoulder Extension MMT, Gross Movement  (3/5) fair  -EM      Lt Shoulder ABduction MMT, Gross Movement  (3/5) fair  -EM      Lt Shoulder ADduction MMT, Gross Movement  (3/5) fair  -EM      Lt Elbow Flexion MMT, Gross Movement  (3-/5) fair minus  -EM      Lt  Elbow Extension MMT, Gross Movement  (3-/5) fair minus  -EM      Lt Wrist Flexion MMT, Gross Movement  (3/5) fair  -EM      Lt Wrist Extension MMT, Gross Movement  (3-/5) fair minus  -EM         ADL Assessment/Intervention    Comment, IADL Assessment/Training  Pt reports she keeps forgetting to do her own UB dressing but verbally reports how to do it.   -EM        User Key  (r) = Recorded By, (t) = Taken By, (c) = Cosigned By    Initials Name Provider Type    EM Myra Mitchell, OTR Occupational Therapist                  OT Assessment/Plan     Row Name 07/16/19 0930          OT Assessment    Functional Limitations  Decreased safety during functional activities;Impaired locomotion;Limitation in home management;Limitations in community activities;Performance in leisure activities;Limitations in functional capacity and performance;Performance in self-care ADL  -EM     Impairments  Balance;Coordination;Dexterity;Muscle strength;Sensation;Pain;Motor function;Range of motion;Poor body mechanics;Posture  -EM     Assessment Comments  Pt demo increased movement at L UE; however continues to be non-functional. She demo increased awareness to midline; however continues to require VC for awareness and correcting posture. She continues to allow her family to assist with dressing tasks but reports she will do more for herself.   -EM        OT Plan    OT Frequency  2x/week  -EM     Predicted Duration of Therapy Intervention (Therapy Eval)  8 visits  -EM     Planned CPT's?  OT EVAL MOD COMPLEXITY: 66366;OT THER PROC EA 15 MIN: 24521RB;OT SELF CARE/MGMT/TRAIN 15 MIN: 36487;OT THER ACT EA 15 MIN: 21280TC;OT NEUROMUSC RE EDUCATION EA 15 MIN: 25923  -EM     Planned Therapy Interventions (Optional Details)  balance training;home exercise program;motor coordination training;strengthening;ROM (Range of Motion);postural re-education;patient/family education;neuromuscular re-education;stretching;transfer training;other (see comments)  -EM      OT Plan Comments  Recommend pt continue with skilled OT services to address established goals.  -EM       User Key  (r) = Recorded By, (t) = Taken By, (c) = Cosigned By    Initials Name Provider Type    Myra León OTR Occupational Therapist          OT Goals     Row Name 07/16/19 0930          OT Short Term Goals    STG Date to Achieve  05/17/19  -EM     STG 1  Pt will complete L UE AAROM/PROM w/ min A   -EM     STG 1 Progress  Met  -EM     STG 2  Pt will participate in standing leisure activity for 8 mins w/o rest break  -EM     STG 2 Progress  Ongoing  -EM     STG 3  Pt will be educated in AE/adaptive technique to increase independence w/ dressing and bathing tasks  -EM     STG 3 Progress  Ongoing  -EM        Long Term Goals    LTG Date to Achieve  08/13/19  -EM     LTG 1  Pt will complete HEPs w/ [place]  -EM     LTG 1 Progress  Ongoing  -EM     LTG 2  Pt will participate in standing leisure activity for 10 mins w/o rest break  -EM     LTG 2 Progress  Ongoing  -EM     LTG 3  Patient will use AE/Adaptive technique to complete dressing tasks w/o assist  -EM     LTG 3 Progress  Ongoing  -EM       User Key  (r) = Recorded By, (t) = Taken By, (c) = Cosigned By    Initials Name Provider Type    Myra León OTR Occupational Therapist          Therapy Education  Education Details: encouraged to complete dressing tasks w/o assist and discussed technique  Given: HEP, Posture/body mechanics  Program: Reinforced  How Provided: Verbal  Provided to: Patient  Level of Understanding: Verbalized      OT Exercises     Row Name 07/16/19 0930             Precautions    Existing Precautions/Restrictions  fall  -EM         Total Minutes    42433 - OT Therapeutic Exercise Minutes  15  -EM      67127 -  OT Neuromuscular Reeducation Minutes  15  -EM         Exercise 1    Exercise Name 1  Energy, strength and activation and awareness to L side  -EM      Time (Minutes) 1  nu-step at level 6 w/ ace wrap for L hand - 10  mins previous encounters should have been L hand w/ ace wrap  -EM         Exercise 2    Exercise Name 2  Reassessment completed  -EM         Exercise 3    Exercise Name 3  Pt completed standing while using trekking pole with L hand and stepping R LE on/off foam disc. PT addressed L knee extension. VC throughout for upright positioning and WBing down through L UE.   -EM        User Key  (r) = Recorded By, (t) = Taken By, (c) = Cosigned By    Initials Name Provider Type    Myra León OTR Occupational Therapist            How much help from another is currently needed...  Putting on and taking off regular lower body clothing?: total  Bathing (including washing, rinsing, and drying): a lot  Toileting (which includes using toilet bed pan or urinal): a little  Putting on and taking off regular upper body clothing: a lot  Taking care of personal grooming (such as brushing teeth): a little  Eating meals: a little  AM-PAC 6 Clicks Score (OT): 14         Time Calculation:   OT Start Time: 0930     Therapy Charges for Today     Code Description Service Date Service Provider Modifiers Qty    56396674998  OT NEUROMUSC RE EDUCATION EA 15 MIN 7/16/2019 Myra Mitchell OTR GO 1    [id number]  OT THER PROC EA 15 MIN 7/16/2019 Myra Mitchell OTR GO 1                    KALANI Hayes  7/16/2019

## 2019-07-16 NOTE — THERAPY TREATMENT NOTE
"    Outpatient Physical Therapy Neuro Treatment Note  [facility]     Patient Name: Hodan Beverly  : 1950  MRN: [id number]  Today's Date: 2019      Visit Date: 2019    Visit Dx:    ICD-10-CM ICD-9-CM   1. Gait difficulty R26.9 781.2   2. Balance problem R26.89 781.99       Patient Active Problem List   Diagnosis   • Vaginal atrophy   • Stroke (CMS/HCC)   • Seizures (CMS/HCC)   • Menopause   • Hypertension   • Hyperlipidemia   • Depression   • Chronic constipation   • Asthma   • Senile osteoporosis           PT Neuro     Row Name 19 0930             Subjective Comments    Subjective Comments  \"I'm doing well.\"  -MW         Subjective Pain    Able to rate subjective pain?  yes  -MW      Pre-Treatment Pain Level  0  -MW      Post-Treatment Pain Level  0  -MW         Balance Skills Training    51657 -  PT Neuromuscular Reeducation Minutes  15  -MW      Training Strategies (Balance)  Co-tx with OT who focused on L UE WB through treking pole with L LE stance and R LE stepping fwd and back to blue foam disc x 5 and then step fwd and to the side to blue foam disc with L UE A on treking pole and mod A for L LE ext and wt shift.  Pt. requries seated rest break in between.  Sit to stand with therapist and emphasis on L wt shift touching hips and  shoulder on left side with therapist x 10 with min/mod A.  -        User Key  (r) = Recorded By, (t) = Taken By, (c) = Cosigned By    Initials Name Provider Type     Maria Alejandra Layton, PT Physical Therapist                  PT Assessment/Plan     Row Name 19 9735          PT Assessment    Assessment Comments  Pt. requires min/mod A with L WB/wt shift with sit to stand and R LE stepping activities to blue foam.  Pt. to benefit from skilled PT services to improve gait, balance, strength, trnasfers and overall functional mobility.  -        PT Plan    PT Plan Comments  Continue with PT services to improve gait, balance, strength, transfers and " "overall functional mobility.  -MW       User Key  (r) = Recorded By, (t) = Taken By, (c) = Cosigned By    Initials Name Provider Type    Maria Alejandra Singh, PT Physical Therapist             Exercises     Row Name 07/16/19 0930 07/16/19 0930          Precautions    Existing Precautions/Restrictions  --  fall  -EM        Subjective Comments    Subjective Comments  \"I'm doing well.\"  -MW  --        Subjective Pain    Able to rate subjective pain?  yes  -MW  --     Pre-Treatment Pain Level  0  -MW  --     Post-Treatment Pain Level  0  -MW  --        Total Minutes    76493 -  PT Neuromuscular Reeducation Minutes  15  -MW  --        Exercise 1    Exercise Name 1  NuStep L6  -MW  --     Time 1  8 min  -MW  --     Additional Comments  L UE ace wrapped on, B LE's and belt around knees  -MW  --       User Key  (r) = Recorded By, (t) = Taken By, (c) = Cosigned By    Initials Name Provider Type    EM Myra Mitchell, OTR Occupational Therapist    Maria Alejandra Singh, PT Physical Therapist                          Therapy Education  Given: Posture/body mechanics, Mobility training  Program: Reinforced  How Provided: Verbal  Provided to: Patient  Level of Understanding: Teach back education performed, Verbalized              Time Calculation:   Start Time: 0930   Therapy Charges for Today     Code Description Service Date Service Provider Modifiers Qty    63240043834  PT NEUROMUSC RE EDUCATION EA 15 MIN 7/16/2019 Maria Alejandra Layton, PT GP 1                    Maria Alejandra Layton PT  7/16/2019     "

## 2019-07-19 ENCOUNTER — HOSPITAL ENCOUNTER (OUTPATIENT)
Dept: OCCUPATIONAL THERAPY | Facility: HOSPITAL | Age: 69
Setting detail: THERAPIES SERIES
Discharge: HOME | End: 2019-07-19

## 2019-07-19 DIAGNOSIS — Z74.09 DECREASED FUNCTIONAL MOBILITY AND ENDURANCE: Primary | ICD-10-CM

## 2019-07-19 DIAGNOSIS — Z78.9 DECREASED INDEPENDENCE WITH ACTIVITIES OF DAILY LIVING: ICD-10-CM

## 2019-07-19 PROCEDURE — 97110 THERAPEUTIC EXERCISES: CPT | Performed by: OCCUPATIONAL THERAPIST

## 2019-07-19 PROCEDURE — 97112 NEUROMUSCULAR REEDUCATION: CPT | Performed by: OCCUPATIONAL THERAPIST

## 2019-07-19 PROCEDURE — 97032 APPL MODALITY 1+ESTIM EA 15: CPT | Performed by: OCCUPATIONAL THERAPIST

## 2019-07-19 PROCEDURE — 97530 THERAPEUTIC ACTIVITIES: CPT | Performed by: OCCUPATIONAL THERAPIST

## 2019-07-19 NOTE — THERAPY TREATMENT NOTE
Outpatient Occupational Therapy Neuro Treatment Note  [facility]     Patient Name: Hodan Beverly  : 1950  MRN: [id number]  Today's Date: 2019       Visit Date: 2019    Patient Active Problem List   Diagnosis   • Vaginal atrophy   • Stroke (CMS/HCC)   • Seizures (CMS/HCC)   • Menopause   • Hypertension   • Hyperlipidemia   • Depression   • Chronic constipation   • Asthma   • Senile osteoporosis        Past Medical History:   Diagnosis Date   • Asthma    • Chronic constipation    • Depression    • Hyperlipidemia    • Hypertension    • Menopause    • Seizures (CMS/HCC)    • Stroke (CMS/HCC)    • Vaginal atrophy         Past Surgical History:   Procedure Laterality Date   • BREAST EXCISIONAL BIOPSY Left    • CRANIOPLASTY     • CRANIOTOMY     • HYSTERECTOMY  1988    oophorectomy, unilateral   • LUNG SURGERY Left     removal of nodule   • OOPHORECTOMY      unilateral   • UTERINE FIBROID SURGERY           Visit Dx:    ICD-10-CM ICD-9-CM   1. Decreased functional mobility and endurance Z74.09 780.99   2. Decreased independence with activities of daily living Z65.8 V62.89                   OT Assessment/Plan     Row Name 19 0945          OT Assessment    Assessment Comments  Pt is resistant to WSing and WBing toward L side. She continues to demo limited functional use of L UE; however muscle activation noted throughout.   -EM        OT Plan    OT Plan Comments  continue per POC  -EM       User Key  (r) = Recorded By, (t) = Taken By, (c) = Cosigned By    Initials Name Provider Type    Myra León, OTR Occupational Therapist              Therapy Education  Given: HEP, Posture/body mechanics  Program: Reinforced  How Provided: Verbal, Demonstration  Provided to: Patient  Level of Understanding: Verbalized, Demonstrated    Modalities     Row Name 19 0995             ELECTRICAL STIMULATION    Attended/Unattended  Attended  -EM      Stimulation Type  Russian  -EM      Max mAmp  17.5   -EM      Location/Electrode Placement/Other  triceps and wrist extensors in conjunction with WBing and AAROM/AROM/PROM  -EM      88476 - OT E-Stim Attended (Manual) Minutes  15  -EM        User Key  (r) = Recorded By, (t) = Taken By, (c) = Cosigned By    Initials Name Provider Type    Myra León, OTR Occupational Therapist        OT Exercises     Row Name 07/19/19 0945             Precautions    Existing Precautions/Restrictions  fall  -EM         Subjective Comments    Subjective Comments  Pt reports she is getting a little more feeling back in her L LE  -EM         Subjective Pain    Able to rate subjective pain?  yes  -EM      Pre-Treatment Pain Level  0  -EM      Post-Treatment Pain Level  0  -EM         Total Minutes    97025 - OT Therapeutic Exercise Minutes  15  -EM      97523 -  OT Neuromuscular Reeducation Minutes  15  -EM      92116 - OT Therapeutic Activity Minutes  15  -EM         Exercise 1    Exercise Name 1  Energy, strength and activation and awareness to L side  -EM      Time (Minutes) 1  nu-step at level 6 w/ ace wrap for L hand - 10 mins  -EM         Exercise 2    Exercise Name 2  Sitting EOM while WBing on L UE w/ [place]/a to position and sustain position of L hand and NMES for elbow and wrist/digit extension. Pt used R UE to reach across midline and complete FMC task. VC throughout for posture and trusting L side for stabilizing.   -EM         Exercise 3    Exercise Name 3  Completed standing and WSing w/ L UE WBing and tactile cues and VC for full WS onto L side.   -EM      Sets 3  1  -EM      Reps 3  10  -EM         Exercise 4    Exercise Name 4  Standing while completing pushing/pulling pattern in conjunction with NMES while focusing on WBing and WSing toward L side.   -EM      Time (Minutes) 14  3 mins  -EM        User Key  (r) = Recorded By, (t) = Taken By, (c) = Cosigned By    Initials Name Provider Type    Myar León OTR Occupational Therapist                      Time  Calculation:   OT Start Time: 0945     Therapy Charges for Today     Code Description Service Date Service Provider Modifiers Qty    84953667669 HC OT NEUROMUSC RE EDUCATION EA 15 MIN 7/19/2019 Myra Mitchell OTR GO 1    75444479107 HC OT THER PROC EA 15 MIN 7/19/2019 Myra Mitchell OTR GO 1    08003869042 HC OT THERAPEUTIC ACT EA 15 MIN 7/19/2019 Myra Mitchell OTR GO 1    59349999516 HC OT ELEC STIM EA-PER 15 MIN 7/19/2019 Myra Mitchell OTR GO 1                    KALANI Hayes  7/19/2019

## 2019-07-22 ENCOUNTER — HOSPITAL ENCOUNTER (OUTPATIENT)
Dept: PHYSICAL THERAPY | Facility: HOSPITAL | Age: 69
Setting detail: THERAPIES SERIES
Discharge: HOME | End: 2019-07-22

## 2019-07-22 DIAGNOSIS — R26.9 GAIT DIFFICULTY: Primary | ICD-10-CM

## 2019-07-22 DIAGNOSIS — R26.89 BALANCE PROBLEM: ICD-10-CM

## 2019-07-22 PROCEDURE — 97110 THERAPEUTIC EXERCISES: CPT | Performed by: PHYSICAL THERAPIST

## 2019-07-22 NOTE — THERAPY TREATMENT NOTE
"    Outpatient Physical Therapy Neuro Treatment Note  [facility]     Patient Name: Hodan Beverly  : 1950  MRN: [id number]  Today's Date: 2019      Visit Date: 2019    Visit Dx:    ICD-10-CM ICD-9-CM   1. Gait difficulty R26.9 781.2   2. Balance problem R26.89 781.99       Patient Active Problem List   Diagnosis   • Vaginal atrophy   • Stroke (CMS/HCC)   • Seizures (CMS/HCC)   • Menopause   • Hypertension   • Hyperlipidemia   • Depression   • Chronic constipation   • Asthma   • Senile osteoporosis           PT Neuro     Row Name 19 1100             Transfers    61081 - PT Therapeutic Activity Minutes  5  -MW      Sit-Stand [place] (Transfers)  minimum assist (75% patient effort)  -MW      Stand-Sit [place] (Transfers)  minimum assist (75% patient effort)  -MW      Comment (Transfers)  L UE A with pt unable to WB on L ankle secondary to pain  -MW        User Key  (r) = Recorded By, (t) = Taken By, (c) = Cosigned By    Initials Name Provider Type    Maria Alejandra Singh, PT Physical Therapist                  PT Assessment/Plan     Row Name 19 1100          PT Assessment    Assessment Comments  Pt. reports wanting to go home after 15 min secondary to L ankle pain and feeling sick.  Pt. requries min A with stand pivot transfers and decreased L LE WB secondary to pain.  Pt. to benefit from skilled PT services to improve functional mobility.  -MW        PT Plan    PT Plan Comments  Continue with PT services to improve gait, balance, strength, transfers and overall functional mobility.  -MW       User Key  (r) = Recorded By, (t) = Taken By, (c) = Cosigned By    Initials Name Provider Type    Maria Alejandra Singh, PT Physical Therapist             Exercises     Row Name 19 1100 19 1045          Subjective Comments    Subjective Comments  --  \"I don't feel well today.  I feel nauseous and my L ankle hurts.\"  -MW        Subjective Pain    Able to rate subjective pain?  " --  yes  -MW     Pre-Treatment Pain Level  --  6  -MW     Post-Treatment Pain Level  --  6  -MW     Subjective Pain Comment  --  L ankle  -MW        Total Minutes    19348 - PT Therapeutic Exercise Minutes  --  8  -MW     27459 - PT Therapeutic Activity Minutes  5  -MW  --        Exercise 1    Exercise Name 1  --  NuStep L6  -MW     Time 1  --  8 min  -MW     Additional Comments  --  L UE ace wrapped on, B LE's and belt around knees  -MW       User Key  (r) = Recorded By, (t) = Taken By, (c) = Cosigned By    Initials Name Provider Type    Maria Alejandra Singh, PT Physical Therapist                          Therapy Education  Given: Symptoms/condition management  Program: Reinforced  How Provided: Demonstration  Provided to: Patient  Level of Understanding: Verbalized              Time Calculation:   Start Time: 1045   Therapy Charges for Today     Code Description Service Date Service Provider Modifiers Qty    15771174337  PT THER PROC EA 15 MIN 7/22/2019 Maria Alejandra Layton, PT GP 1                    Maria Alejandra Layton PT  7/22/2019

## 2019-07-23 ENCOUNTER — HOSPITAL ENCOUNTER (OUTPATIENT)
Dept: OCCUPATIONAL THERAPY | Facility: HOSPITAL | Age: 69
Setting detail: THERAPIES SERIES
Discharge: HOME | End: 2019-07-23

## 2019-07-23 DIAGNOSIS — Z78.9 DECREASED INDEPENDENCE WITH ACTIVITIES OF DAILY LIVING: ICD-10-CM

## 2019-07-23 DIAGNOSIS — Z74.09 DECREASED FUNCTIONAL MOBILITY AND ENDURANCE: Primary | ICD-10-CM

## 2019-07-23 PROCEDURE — 97110 THERAPEUTIC EXERCISES: CPT | Performed by: OCCUPATIONAL THERAPIST

## 2019-07-23 PROCEDURE — 97112 NEUROMUSCULAR REEDUCATION: CPT | Performed by: OCCUPATIONAL THERAPIST

## 2019-07-23 NOTE — THERAPY TREATMENT NOTE
Outpatient Occupational Therapy Neuro Treatment Note  [facility]     Patient Name: Hodan Beverly  : 1950  MRN: [id number]  Today's Date: 2019       Visit Date: 2019    Patient Active Problem List   Diagnosis   • Vaginal atrophy   • Stroke (CMS/HCC)   • Seizures (CMS/HCC)   • Menopause   • Hypertension   • Hyperlipidemia   • Depression   • Chronic constipation   • Asthma   • Senile osteoporosis        Past Medical History:   Diagnosis Date   • Asthma    • Chronic constipation    • Depression    • Hyperlipidemia    • Hypertension    • Menopause    • Seizures (CMS/HCC)    • Stroke (CMS/HCC)    • Vaginal atrophy         Past Surgical History:   Procedure Laterality Date   • BREAST EXCISIONAL BIOPSY Left    • CRANIOPLASTY     • CRANIOTOMY     • HYSTERECTOMY  1988    oophorectomy, unilateral   • LUNG SURGERY Left     removal of nodule   • OOPHORECTOMY      unilateral   • UTERINE FIBROID SURGERY           Visit Dx:    ICD-10-CM ICD-9-CM   1. Decreased functional mobility and endurance Z74.09 780.99   2. Decreased independence with activities of daily living Z65.8 V62.89                   OT Assessment/Plan     Row Name 19 0945          OT Assessment    Assessment Comments  Pt continues to resist Wsing and WBing toward L side. She demo increased activation in L tricep and increased midline during forward trunk flexion following repetition to increase comfort.   -EM        OT Plan    OT Plan Comments  Continue per POC  -EM       User Key  (r) = Recorded By, (t) = Taken By, (c) = Cosigned By    Initials Name Provider Type    Myra León, OTR Occupational Therapist              Therapy Education  Given: HEP, Posture/body mechanics  Program: Reinforced  How Provided: Verbal, Demonstration  Provided to: Patient  Level of Understanding: Demonstrated, Verbalized      OT Exercises     Row Name 19 0945             Precautions    Existing Precautions/Restrictions  fall  -EM          Subjective Comments    Subjective Comments  Pt reports she is still uncomfortable trusting and leaning toward her L side.   -EM         Subjective Pain    Able to rate subjective pain?  yes  -EM      Pre-Treatment Pain Level  0  -EM      Post-Treatment Pain Level  0  -EM         Total Minutes    48853 - OT Therapeutic Exercise Minutes  15  -EM      80210 -  OT Neuromuscular Reeducation Minutes  15  -EM         Exercise 1    Exercise Name 1  Energy, strength and activation and awareness to L side  -EM      Time (Minutes) 1  nu-step at level 6 w/ ace wrap for L hand - 10 mins  -EM         Exercise 2    Exercise Name 2  Sitting EOM while WBing onto L hand with assist to position fingers in extension. Pt completed 10x2 sets of elbow extension while pressing down into the mat.  -EM         Exercise 3    Exercise Name 3  Completed standing and WSing w/ L UE WBing and tactile cues and VC for full WS onto L side. L hand on trekking pole and min-mod A to encourage full WS toward L side  -EM      Sets 3  1  -EM      Reps 3  10  -EM         Exercise 4    Exercise Name 4  Forward flexion at trunk with flexion at B shoulders while R hand assisted to position L hand on top of therapy ball.  Pt leaned forward and rolled the ball forward 10x w/ [place]/A on L hand, tactle cues and verbal cues to encourage forward flexion and WBing through L side.   -EM        User Key  (r) = Recorded By, (t) = Taken By, (c) = Cosigned By    Initials Name Provider Type    EM Myra Mitchell OTR Occupational Therapist                      Time Calculation:   OT Start Time: 0945     Therapy Charges for Today     Code Description Service Date Service Provider Modifiers Qty    72063567974  OT NEUROMUSC RE EDUCATION EA 15 MIN 7/23/2019 Myra Mitchell OTR GO 1    [id number]  OT THER PROC EA 15 MIN 7/23/2019 Myra Mitchell OTR GO 1                    KALANI Hayes  7/23/2019

## 2019-08-02 ENCOUNTER — APPOINTMENT (OUTPATIENT)
Dept: OCCUPATIONAL THERAPY | Facility: HOSPITAL | Age: 69
End: 2019-08-02

## 2019-08-09 ENCOUNTER — HOSPITAL ENCOUNTER (OUTPATIENT)
Dept: OCCUPATIONAL THERAPY | Facility: HOSPITAL | Age: 69
Setting detail: THERAPIES SERIES
Discharge: HOME | End: 2019-08-09

## 2019-08-09 DIAGNOSIS — Z74.09 DECREASED FUNCTIONAL MOBILITY AND ENDURANCE: Primary | ICD-10-CM

## 2019-08-09 DIAGNOSIS — Z78.9 DECREASED INDEPENDENCE WITH ACTIVITIES OF DAILY LIVING: ICD-10-CM

## 2019-08-09 PROCEDURE — 97032 APPL MODALITY 1+ESTIM EA 15: CPT | Performed by: OCCUPATIONAL THERAPIST

## 2019-08-09 PROCEDURE — 97112 NEUROMUSCULAR REEDUCATION: CPT | Performed by: OCCUPATIONAL THERAPIST

## 2019-08-09 PROCEDURE — 97110 THERAPEUTIC EXERCISES: CPT | Performed by: OCCUPATIONAL THERAPIST

## 2019-08-09 NOTE — THERAPY DISCHARGE NOTE
Outpatient Occupational Therapy Neuro Progress Note/Discharge Summary   [facility]     Patient Name: Hodan Beverly  : 1950  MRN: [id number]  Today's Date: 2019      Visit Date: 2019    Patient Active Problem List   Diagnosis   • Vaginal atrophy   • Stroke (CMS/HCC)   • Seizures (CMS/HCC)   • Menopause   • Hypertension   • Hyperlipidemia   • Depression   • Chronic constipation   • Asthma   • Senile osteoporosis        Past Medical History:   Diagnosis Date   • Asthma    • Chronic constipation    • Depression    • Hyperlipidemia    • Hypertension    • Menopause    • Seizures (CMS/HCC)    • Stroke (CMS/HCC)    • Vaginal atrophy         Past Surgical History:   Procedure Laterality Date   • BREAST EXCISIONAL BIOPSY Left    • CRANIOPLASTY     • CRANIOTOMY     • HYSTERECTOMY  1988    oophorectomy, unilateral   • LUNG SURGERY Left     removal of nodule   • OOPHORECTOMY      unilateral   • UTERINE FIBROID SURGERY           Visit Dx:    ICD-10-CM ICD-9-CM   1. Decreased functional mobility and endurance Z74.09 780.99   2. Decreased independence with activities of daily living Z65.8 V62.89       OT Neuro     Row Name 19 1100             Subjective Comments    Subjective Comments  Pt reports she is without her AFO d/t the orthitist making a custom one  -EM         Subjective Pain    Able to rate subjective pain?  yes  -EM      Pre-Treatment Pain Level  0  -EM      Post-Treatment Pain Level  0  -EM         Sensation    Additional Comments  same as last reassessment  -EM         General ROM    GENERAL ROM COMMENTS  ROM in R UE WFL; L UE continues to demo limitations throughout.   -EM         MMT (Manual Muscle Testing)    General MMT Comments  R UE 4+/5  -EM         MMT Left Upper Ext    Lt Shoulder Extension MMT, Gross Movement  (3/5) fair  -EM      Lt Shoulder ABduction MMT, Gross Movement  (3/5) fair  -EM      Lt Shoulder ADduction MMT, Gross Movement  (3/5) fair  -EM      Lt Elbow Flexion MMT,  Gross Movement  (3-/5) fair minus  -EM      Lt Elbow Extension MMT, Gross Movement  (3-/5) fair minus  -EM      Lt Wrist Flexion MMT, Gross Movement  (3/5) fair  -EM      Lt Wrist Extension MMT, Gross Movement  (3-/5) fair minus  -EM         ADL Assessment/Intervention    Comment, IADL Assessment/Training  minimal changes since last reassessment.   -EM        User Key  (r) = Recorded By, (t) = Taken By, (c) = Cosigned By    Initials Name Provider Type    LYDIA KiserpatoMyra, OTR Occupational Therapist                  OT Assessment/Plan     Row Name 08/09/19 1500          OT Assessment    Assessment Comments  Pt recently went to get a new AFO and they are making a custom one so she is without an AFO at this time. This makes functional mobility impossible and transfers unsafe d/t increased risk of turning L ankle. However she is scheduled to get one soon and PT will continue to follow up with her. OT is discharging at this time. Pt has made minimal gains functionally; however demo increased awareness and comfort with mindline and WBing through L side.   -EM        OT Plan    OT Plan Comments  Discharge  -EM       User Key  (r) = Recorded By, (t) = Taken By, (c) = Cosigned By    Initials Name Provider Type    LYDIA Myra OTR Occupational Therapist           OT Goals     Row Name 08/09/19 1500          OT Short Term Goals    STG Date to Achieve  05/17/19  -EM     STG 1  Pt will complete L UE AAROM/PROM w/ min A   -EM     STG 1 Progress  Met  -EM     STG 2  Pt will participate in standing leisure activity for 8 mins w/o rest break  -EM     STG 2 Progress  Met  -EM     STG 3  Pt will be educated in AE/adaptive technique to increase independence w/ dressing and bathing tasks  -EM     STG 3 Progress  Met  -EM        Long Term Goals    LTG Date to Achieve  08/13/19  -EM     LTG 1  Pt will complete HEPs w/ [place]  -EM     LTG 1 Progress  Met  -EM     LTG 2  Pt will participate in standing leisure activity for 10  mins w/o rest break  -EM     LTG 2 Progress  Not Met  -EM     LTG 3  Patient will use AE/Adaptive technique to complete dressing tasks w/o assist  -EM     LTG 3 Progress  Met  -EM       User Key  (r) = Recorded By, (t) = Taken By, (c) = Cosigned By    Initials Name Provider Type    Myra León OTR Occupational Therapist               Modalities     Row Name 08/09/19 1100             ELECTRICAL STIMULATION    Attended/Unattended  Attended  -EM      Stimulation Type  Russian  -EM      Max mAmp  17.5  -EM      Location/Electrode Placement/Other  triceps and wrist extensors in conjunction with WBing and AAROM/AROM/PROM  -EM      42284 - PT E-Stim Attended (Manual) Minutes  25  -EM      80939 - OT E-Stim Attended (Manual) Minutes  15  -EM        User Key  (r) = Recorded By, (t) = Taken By, (c) = Cosigned By    Initials Name Provider Type    Myra León OTR Occupational Therapist          OT Exercises     Row Name 08/09/19 1100             Precautions    Existing Precautions/Restrictions  fall  -EM         Total Minutes    47889 - OT Therapeutic Exercise Minutes  20  -EM      19513 -  OT Neuromuscular Reeducation Minutes  25  -EM      73509 - OT Therapeutic Activity Minutes  --  -EM         Exercise 1    Exercise Name 1  Discharge evaluation completed.   -EM         Exercise 2    Exercise Name 2  Sitting EOM while WBing onto L hand with assist to position fingers in extension. Pt completed 10x2 sets of elbow extension while pressing down into the mat. In conjunction with NMES for elbow and wrist extension.  -EM         Exercise 3    Exercise Name 3  Completed sidelying on R side and using powder board to focus on extension and flexion of R shoulder, elbow and scapular protraction/retraction  -EM      Sets 3  1  -EM      Reps 3  10  -EM        User Key  (r) = Recorded By, (t) = Taken By, (c) = Cosigned By    Initials Name Provider Type    Myra León OTR Occupational Therapist              How much help  from another is currently needed...  Putting on and taking off regular lower body clothing?: a lot  Bathing (including washing, rinsing, and drying): a lot  Toileting (which includes using toilet bed pan or urinal): a little  Putting on and taking off regular upper body clothing: a little  Taking care of personal grooming (such as brushing teeth): a little  Eating meals: a little  AM-PAC 6 Clicks Score (OT): 16         Time Calculation:   OT Start Time: 1500     Therapy Charges for Today     Code Description Service Date Service Provider Modifiers Qty    86316861530  OT NEUROMUSC RE EDUCATION EA 15 MIN 8/9/2019 Myra Mitchell OTR GO 2    87537641245 HC OT THER PROC EA 15 MIN 8/9/2019 Myra Mitchell OTR GO 1    [id number]  OT ELEC STIM EA-PER 15 MIN 8/9/2019 Myra Mitchell OTR GO 1                OP OT Discharge Summary  Date of Discharge: 08/09/19  Reason for Discharge: Maximum functional potential achieved  Outcomes Achieved: Patient able to partially acheive established goals  Discharge Destination: Home with home program, Home with caregiver assist        KALANI Hayes  8/9/2019

## 2019-08-13 ENCOUNTER — HOSPITAL ENCOUNTER (OUTPATIENT)
Dept: PHYSICAL THERAPY | Facility: HOSPITAL | Age: 69
Setting detail: THERAPIES SERIES
Discharge: HOME | End: 2019-08-13

## 2019-08-13 PROCEDURE — 97112 NEUROMUSCULAR REEDUCATION: CPT | Performed by: PHYSICAL THERAPIST

## 2019-08-13 PROCEDURE — 97110 THERAPEUTIC EXERCISES: CPT | Performed by: PHYSICAL THERAPIST

## 2019-08-13 NOTE — THERAPY DISCHARGE NOTE
"     Outpatient Physical Therapy Hand Treatment Note/Discharge Summary   [place]     Patient Name: Hodan Beverly  : 1950  MRN: [id number]  Today's Date: 2019         Visit Date: 2019    Visit Dx:  No diagnosis found.    Patient Active Problem List   Diagnosis   • Vaginal atrophy   • Stroke (CMS/HCC)   • Seizures (CMS/HCC)   • Menopause   • Hypertension   • Hyperlipidemia   • Depression   • Chronic constipation   • Asthma   • Senile osteoporosis            PT Neuro     Row Name 19 1000 19 0930          Subjective Comments    Subjective Comments  \"I got my brace and I'm so happy.\"  -MW  --        Subjective Pain    Able to rate subjective pain?  yes  -MW  --     Pre-Treatment Pain Level  0  -MW  --     Post-Treatment Pain Level  0  -MW  --        Bed Mobility Assessment/Treatment    Bed Mobility Assessment/Treatment  --  bed mobility (all) activities  -     Aiken Level (Bed Mobility)  --  minimum assist (75% patient effort)  -     Bed Mobility, Safety Issues  --  decreased use of arms for pushing/pulling;decreased use of legs for bridging/pushing  -        Transfers    Sit-Stand Aiken (Transfers)  --  supervision  -     Stand-Sit Aiken (Transfers)  --  supervision  -     Comment (Transfers)  --  L UE A with pt unable to WB on L ankle secondary to pain  -        Gait/Stairs Assessment/Training    Aiken Level (Gait)  --  contact guard  -     Assistive Device (Gait)  --  cane, quad;other (see comments) double metal upright AFO  -     Distance in Feet (Gait)  --  125  -MW     Pattern (Gait)  --  3-point  -     Deviations/Abnormal Patterns (Gait)  --  base of support, wide;leslie decreased;left sided deviations;stride length decreased  -     Bilateral Gait Deviations  --  forward flexed posture;leans right;knee hyperextension  -     Left Sided Gait Deviations  --  knee hyperextension;hip circumduction;leans right;forward flexed posture " " -MW     Right Sided Gait Deviations  --  leans right  -MW        Balance Skills Training    Training Strategies (Balance)  --  Issued and performed HEP for both pool and land, see for details.  Discharge completed, see for details.  -MW       User Key  (r) = Recorded By, (t) = Taken By, (c) = Cosigned By    Initials Name Provider Type    Maria Alejandra Singh, PT Physical Therapist                PT Assessment/Plan     Row Name 08/13/19 1000          PT Assessment    Assessment Comments  Pt. met goals for SILVA and sit to stand. Pt. did progress towards all goals and was issued HEP for both pool and land.  Pt. demosntrates improved gait wearing new double metal upright AFO.  -MW        PT Plan    PT Plan Comments  Discharge from PT services and pt to return as needed later on in the year.  -MW       User Key  (r) = Recorded By, (t) = Taken By, (c) = Cosigned By    Initials Name Provider Type    Maria Alejandra Singh, PT Physical Therapist              Exercises     Row Name 08/13/19 1000 08/13/19 0930          Subjective Comments    Subjective Comments  \"I got my brace and I'm so happy.\"  -MW  --        Subjective Pain    Able to rate subjective pain?  yes  -MW  --     Pre-Treatment Pain Level  0  -MW  --     Post-Treatment Pain Level  0  -MW  --        Total Minutes    79364 - PT Therapeutic Exercise Minutes  --  8  -MW     19625 -  PT Neuromuscular Reeducation Minutes  --  50  -MW        Exercise 1    Exercise Name 1  --  NuStep L6  -MW     Time 1  --  8 min  -MW     Additional Comments  --  L UE ace wrapped on, B LE's and belt around knees  -MW       User Key  (r) = Recorded By, (t) = Taken By, (c) = Cosigned By    Initials Name Provider Type    Maria Alejandra Singh, PT Physical Therapist                         PT OP Goals     Row Name 08/13/19 0900          PT Short Term Goals    STG Date to Achieve  08/20/19  -MW     STG 1  Patient to improve SILVA balance score to >/= 20/56 to decrease client's risk of falls.  " -MW     STG 1 Progress  Met  -MW     STG 2  Patient to ambulate 10 meters with AD within 60 sec with CGA without LOB for improved gait leslie and functional mobility.  -MW     STG 2 Progress  Partially Met  -MW     STG 3  Pt. to perform sit to stand with UE A from various heights and surfaces 2/2 trials without LOB with CGA to improve functional mobility.  -MW     STG 3 Progress  Met  -MW     STG 4  Pt. to perform supine to/from sit with UE A as needed with A 2/2 trials to improve functional mobility.  -MW     STG 4 Progress  Partially Met  -MW     STG 5  Pt. to stand with equal B LE WB up to 1 min with R UE reaching across midline without assist for improved stability.  -MW     STG 5 Progress  Partially Met  -MW        Long Term Goals    LTG Date to Achieve  10/01/19  -MW     LTG 1  Patient to improve SILVA balance score to >/= 35/56 to decrease client's risk of falls.  -MW     LTG 1 Progress  Not Met  -MW     LTG 2  Patient to ambulate 10 meters with AD within 45 sec without LOB for improved gait leslie and functional mobility.  -MW     LTG 2 Progress  Not Met  -MW     LTG 3  Pt. to perform sit to stand without UE A from various heights and surfaces 2/2 trials without LOB with mod I to improve functional mobility.  -MW     LTG 3 Progress  Not Met  -MW     LTG 4  Pt. to stand without UE A up to 1 min with R UE overhead reaching activities for improved functional mobility.  -MW     LTG 4 Progress  Not Met  -MW     LTG 5  Pt. to perform B supine to/from sit with mod I consistently for improved functional mobility.  -MW     LTG 5 Progress  Not Met  -MW     LTG 6  Pt. to be mod I with assist from family from HEP.  -MW     LTG 6 Progress  Met  -MW        Time Calculation    PT Goal Re-Cert Due Date  10/07/19  -MW       User Key  (r) = Recorded By, (t) = Taken By, (c) = Cosigned By    Initials Name Provider Type    Maria Alejandra Singh, PT Physical Therapist          Therapy Education  Education Details: pt and  caregiver educated on going to [facility] to utilize pool.  Given: HEP  Program: New  How Provided: Verbal, Demonstration, Written  Provided to: Patient, Caregiver  Level of Understanding: Verbalized      Outcome Measure Options: 10 Meter Walk, Goodwin Balance  Gait, Assistive Device: quad cane(CGA)  10 Meter Walk Test Self-Selected Velocity  Self-Selected Velocity: Trial 1: 69.5 sec.  Goodwin Balance Scale  Sitting to Standing: able to stand using hands after several tries  Standing Unsupported: able to stand 2 minutes with supervision  Sitting with Back Unsupported but Feet Supported on Floor or on Stool: able to sit safely and securely for 2 minutes  Standing to Sitting: uses back of legs against chair to control descent  Transfers: able to transfer with verbal cuing and/or supervision  Standing Unsupported with Eyes Closed: able to stand 10 seconds with supervision  Standing Unsupported with Feet Together: needs help to attain position but able to stand 15 seconds feet together  Reaching Forward with Outstretched Arm While Standing: can reach forward 5 cm (2 inches)   Object From the Floor From a Standing Position: unable to try/needs assist to keep from losing balance or falling  Turning to Look Behind Over Left and Right Shoulders While Standing: turns sideways only but maintains balance  Turn 360 Degrees: needs assistance while turning  Place Alternate Foot on Step or Stool While Standing Unsupported: needs assistance to keep from falling/unable to try  Standing Unsupported with One Foot in Front: able to take small step independently and hold 30 seconds  Standing on One Leg: tries to lift leg unable to hold 3 seconds but remains standing independently  Goodwin Total Score: 24         Time Calculation:   Start Time: 0930     Therapy Charges for Today     Code Description Service Date Service Provider Modifiers Qty    [id number] HC PT NEUROMUSC RE EDUCATION EA 15 MIN 8/13/2019 Maria Alejandra Layton, PT GP 3     [id number]  PT THER PROC EA 15 MIN 8/13/2019 Maria Alejandra Layton, PT GP 1          PT G-Codes  Outcome Measure Options: 10 Meter Walk, Goodwin Balance  Goodwin Total Score: 24    OP PT Discharge Summary  Date of Discharge: 08/13/19  Reason for Discharge: Maximum functional potential achieved  Outcomes Achieved: Refer to plan of care for updates on goals achieved  Discharge Destination: Home with home program, Home with caregiver assist      Maria Alejandra Layton, PT  8/13/2019

## 2020-01-14 ENCOUNTER — TREATMENT (OUTPATIENT)
Dept: PHYSICAL THERAPY | Facility: CLINIC | Age: 70
End: 2020-01-14

## 2020-01-14 ENCOUNTER — TRANSCRIBE ORDERS (OUTPATIENT)
Dept: PHYSICAL THERAPY | Facility: CLINIC | Age: 70
End: 2020-01-14

## 2020-01-14 DIAGNOSIS — M21.862 ACQUIRED GENU RECURVATUM OF LEFT KNEE: Primary | ICD-10-CM

## 2020-01-14 DIAGNOSIS — M21.862 ACQUIRED GENU RECURVATUM OF LEFT KNEE: ICD-10-CM

## 2020-01-14 DIAGNOSIS — R26.89 BALANCE PROBLEM: ICD-10-CM

## 2020-01-14 DIAGNOSIS — R26.9 GAIT DIFFICULTY: ICD-10-CM

## 2020-01-14 DIAGNOSIS — R26.9 GAIT DIFFICULTY: Primary | ICD-10-CM

## 2020-01-14 PROCEDURE — 97162 PT EVAL MOD COMPLEX 30 MIN: CPT | Performed by: PHYSICAL THERAPIST

## 2020-01-14 PROCEDURE — 97110 THERAPEUTIC EXERCISES: CPT | Performed by: PHYSICAL THERAPIST

## 2020-01-14 NOTE — PROGRESS NOTES
Physical Therapy Initial Evaluation and Plan of Care      Patient: Hodan Beverly   : 1950  Diagnosis/ICD-10 Code:  Gait difficulty [R26.9]  Referring practitioner: No ref. provider found  Date of Initial Visit: 2020  Today's Date: 2020  Patient seen for 1 sessions           Subjective Questionnaire: n/a      Subjective Evaluation    History of Present Illness  Mechanism of injury: Pt had CVA in 2015 and was in the hospital for 3 weeks.  Pt went to inpatient rehab at [facility] for 6 weeks.  Pt went to [facility] for another 6 weeks and then discharged home.  Pt has HHPT and OT for three months.  Pt went back to [facility] for outpatient services for one year and then came for PT/OT services here last year.  Pt reports that she has had a fall since being discharged from here but two in the past year.  Pt broke her left wrist in Oct 2019 from the fall.  Pt has not have not had therapy on the wrist.  Pt stopped walking when she fractured her wrist.  Pt reports loosing her confidence in walking since the last fall.  Pt wears double metal upright L AFO, wearing soft brace on left wrist, ambulates with SBQC, and uses transport chair in the home.  Pt has caregiver, Tram, 3x/wk for approx a half day.  Pts  works full time.  Pts BR has grab bars, higher commode, tub bench in shower with grab bars.      Patient Occupation: retired homemaker; 3 children  Pain  No pain reported    Social Support  Lives in: one-story house  Lives with: spouse    Hand dominance: right    Treatments  Previous treatment: physical therapy  Current treatment: physical therapy  Patient Goals  Patient goals for therapy: increased strength and improved balance  Patient goal: improve overall mobility           Objective       Static Posture   General Observations  Asymmetrical weight bearing and shifted right.     Shoulders  Asymmetric shoulders and rounded.    Ankle/Foot   Ankle/Foot (Left): Calcaneovarus  and supinated.     Neurological Testing     Sensation     Hip   Left Hip   Diminished: light touch    Right Hip   Intact: light touch    Knee   Left Knee   Intact: proprioception  Diminished: light touch     Right Knee   Intact: light touch and proprioception     Ankle/Foot   Left Ankle/Foot   Diminished: light touch  Absent: proprioception    Right Ankle/Foot   Intact: light touch and proprioception     Active Range of Motion   Left Ankle/Foot   Dorsiflexion (ke): 7 degrees     Right Ankle/Foot   Normal active range of motion    Additional Active Range of Motion Details  7 degrees from 0    Strength/Myotome Testing     Left Hip   Planes of Motion   Flexion: 3-  Extension: 2-  Abduction: 2-  Adduction: 3-    Right Hip   Planes of Motion   Flexion: 4  Extension: 4  Abduction: 4  Adduction: 4    Left Knee   Flexion: 3-  Extension: 3-    Right Knee   Flexion: 4  Extension: 4    Left Ankle/Foot   Dorsiflexion: 2-  Plantar flexion: 2+    Right Ankle/Foot   Dorsiflexion: 4  Plantar flexion: 4    Ambulation     Ambulation: Level Surfaces   Ambulation with assistive device: minimum assist    Ambulation: Stairs     Additional Stairs Ambulation Details  N/t secondary to fear of falling    Observational Gait   Gait: asymmetric   Increased right stance time and right swing time. Decreased walking speed, stride length, left stance time, left swing time, left step length and right step length.   Left foot contact pattern: foot flat  Right foot contact pattern: heel to toe  Left arm swing: decreased  Right arm swing: decreased  Base of support: increased    Additional Observational Gait Details  L hip abducted secondary to L ankle instability    Quality of Movement During Gait   Trunk  Forward lean.   Trunk (Right): Positive lateral lean over stance limb.     Knee    Knee (Left): Positive recurvatum.     Ankle    Ankle (Left): Positive foot drop and supinated.     Additional Quality of Movement During Gait Details  Pt must wear  double metal upright L AFO secondary to L ankle instability    General Comments     Ankle/Foot Comments   L ankle swelling compared to R ankle       PT Neuro         Assessment & Plan     Assessment  Impairments: abnormal coordination, abnormal gait, abnormal muscle firing, abnormal muscle tone, abnormal or restricted ROM, activity intolerance, impaired balance, impaired physical strength, lacks appropriate home exercise program and safety issue  Other impairment: balance  Assessment details: Pt presents with evolving symptoms since CVA and L wrist fracture diagnosis.  Pt to benefit from L Dynasplint for decreased L ankle DF AROM and PROM.  Pt to beneftit from skilled PT services to improve gait, balance, strength, transfers and overall functional mobility.  Pt to also benefit from skilled OT services to improve L wrist/UE strength/mobility.  Prognosis: fair  Functional Limitations: walking and standing  Goals  Plan Goals: STG (6 visits)  1. Patient to improve SILVA balance score to >/= 21 /56 to decrease client's risk of falls.  2. Patient to perform TUG within 60 sec with appropriate AD without LOB for improved functional mobility.  3. Patient to ambulate 10 meters with AD within 60 sec without LOB for improved gait leslie and functional mobility.  4. Patient to perform sit to stand transfers from W/C to/from mat with QC with more upright posture and SBA for improved functional mobility.    LTG (12 visits)  1. Patient to improve SILVA balance score to >/= 30/56 to decrease client's risk of falls.  2. Patient to perform TUG within 45 sec with AD without LOB for improved functional mobility.  3. Patient to ambulate 10 meters with AD within 45 sec without LOB for improved gait leslie and functional mobility.  4.  Pt to perform supine to/from sit with UE A as needed with mod I for improved functional mobility.  5.  Pt to be mod I with assist from caregiver/ for HEP.  6.  Pt to perform sit to stand with  increased L LE WB from various heights 2/2 trials without LOB with mod I for improved functional mobility.    Plan  Therapy options: will be seen for skilled physical therapy services  Planned modality interventions: electrical stimulation/Russian stimulation  Planned therapy interventions: fine motor coordination training, gait training, functional ROM exercises, home exercise program, neuromuscular re-education, strengthening, stretching, therapeutic activities, transfer training, abdominal trunk stabilization, balance/weight-bearing training and postural training  Frequency: 1x week  Duration in visits: 12  Treatment plan discussed with: patient  Plan details: Patient will be seen 1x/wk x 12 visits with treatment to include strengthening, stretching, functional e-stim therapy, neuromuscular re-education, balance, gait and endurance training.         Timed:  Manual Therapy:    0     mins  03107;  Therapeutic Exercise:    10     mins  20919;     Neuromuscular Rosa Isela:    0    mins  42134;    Therapeutic Activity:     0     mins  18421;     Gait Trainin     mins  88254;     Ultrasound:     0     mins  75453;    Electrical Stimulation:    0     mins  58406 ( );    Untimed:  Electrical Stimulation:    0     mins  48522 ( );  Mechanical Traction:    0     mins  12007;     Timed Treatment:   10   mins   Total Treatment:     60   mins    PT SIGNATURE: Maria Alejandra Layton, PT   DATE TREATMENT INITIATED: 2020    Initial Certification  Certification Period: 2020  I certify that the therapy services are furnished while this patient is under my care.  The services outlined above are required by this patient, and will be reviewed every 90 days.     PHYSICIAN:       DATE:     Please sign and return via fax to  .. Thank you, [facility].

## 2020-01-22 ENCOUNTER — TRANSCRIBE ORDERS (OUTPATIENT)
Dept: PHYSICAL THERAPY | Facility: CLINIC | Age: 70
End: 2020-01-22

## 2020-01-22 ENCOUNTER — TREATMENT (OUTPATIENT)
Dept: PHYSICAL THERAPY | Facility: CLINIC | Age: 70
End: 2020-01-22

## 2020-01-22 DIAGNOSIS — R26.89 BALANCE PROBLEM: ICD-10-CM

## 2020-01-22 DIAGNOSIS — S62.102A CLOSED FRACTURE OF LEFT WRIST, INITIAL ENCOUNTER: Primary | ICD-10-CM

## 2020-01-22 DIAGNOSIS — R26.9 GAIT DIFFICULTY: Primary | ICD-10-CM

## 2020-01-22 PROCEDURE — 97530 THERAPEUTIC ACTIVITIES: CPT | Performed by: PHYSICAL THERAPIST

## 2020-01-22 PROCEDURE — 97112 NEUROMUSCULAR REEDUCATION: CPT | Performed by: PHYSICAL THERAPIST

## 2020-01-22 PROCEDURE — 97110 THERAPEUTIC EXERCISES: CPT | Performed by: PHYSICAL THERAPIST

## 2020-01-22 PROCEDURE — 97116 GAIT TRAINING THERAPY: CPT | Performed by: PHYSICAL THERAPIST

## 2020-01-22 NOTE — PROGRESS NOTES
"Physical Therapy Daily Progress Note  Visit: 2  Date of Initial Visit: Type: THERAPY  Noted: 1/14/2020    Hodan Beverly reports: \"My caregiver has been writing down the exercises I do and when, she's on top of it.\"    Subjective Evaluation    Pain  Location: L wrist pain with L UE wrapped on nu-step HR so put it back down           Objective   See Exercise, Manual, and Modality Logs for complete treatment.    Exercise 1  Exercise Name 1: NuStep R UE and B LE's with gait belt around both legs to decrease L hip external rotation  Equipment/Resistance 1: level 5  Time: 8 min  Exercise 2  Exercise Name 2: Sit to stand from EOM without UE A with emphasis on increased R LE WB and R lateral trunk flexion; hold standing with mini-squats  Equipment/Resistance 2: min/mod A  Sets/Reps 2: 10  Exercise 3  Exercise Name 3: Standing balance with R LE stepping to 6\" step with R UE A from therapist and then keep one foot on step and hold with B cervcial rot/flex/ext and EC with B cerivcal rot/flex/ext  Equipment/Resistance 3: 6\" step; min/mod A with emphasis on increased R LE WB  Sets/Reps 3: 10  Exercise 4  Exercise Name 4: Ambulation with treking pole with L AFO and L UE A to keep chest lifted  Equipment/Resistance 4: 40'; min/mod A; treking pole                PT Neuro          Assessment & Plan     Assessment  Assessment details: Pt requires min/mod A with standing balance, gait and VCing to increase R LE WB/wt shift.  Pt requires L UE A with standing activities for pt to perform activities with more upright trunk.  Pt demonstrates R lateral trunk flexion and R LE WB with all activities and has impaired midline orientation.  Pt to benefit from skilled PT services to improve gait, balance, strength and overall functional mobility.    Plan  Plan details: Pt to benefit from skilled PT services to improve gait, balance, strength, and overall functional mobility.          Manual Therapy:     0     mins  54190;  Therapeutic " Exercise:     8     mins  31111;     Neuromuscular Rosa Isela:     15    mins  25788;    Therapeutic Activity:      12     mins  65944;     Gait Training:       10     mins  36192;     Ultrasound:      0     mins  47913;    Electrical Stimulation:     0     mins  35748 ( );  Dry Needling      0     mins self-pay  Traction      0     mins 56423  Canalith Repositioning    0     mins 23773      Timed Treatment:   45   mins   Total Treatment:     45   mins    Maria Alejandra Layton, PT  [place] License #: [id number]    Physical Therapist

## 2020-01-24 ENCOUNTER — TREATMENT (OUTPATIENT)
Dept: PHYSICAL THERAPY | Facility: CLINIC | Age: 70
End: 2020-01-24

## 2020-01-24 DIAGNOSIS — R20.8 DECREASED SENSATION OF HAND AND ARM: ICD-10-CM

## 2020-01-24 DIAGNOSIS — Z78.9 IMPAIRED MOBILITY AND ADLS: ICD-10-CM

## 2020-01-24 DIAGNOSIS — R29.898 DECREASED STRENGTH OF UPPER EXTREMITY: ICD-10-CM

## 2020-01-24 DIAGNOSIS — Z74.09 IMPAIRED MOBILITY AND ADLS: ICD-10-CM

## 2020-01-24 DIAGNOSIS — I63.411 CEREBROVASCULAR ACCIDENT (CVA) DUE TO EMBOLISM OF RIGHT MIDDLE CEREBRAL ARTERY (HCC): Primary | ICD-10-CM

## 2020-01-24 PROCEDURE — 97166 OT EVAL MOD COMPLEX 45 MIN: CPT | Performed by: OCCUPATIONAL THERAPIST

## 2020-01-24 NOTE — PROGRESS NOTES
Occupational Therapy Initial Evaluation and Plan of Care      Patient: Hodan Beverly   : 1950  Diagnosis/ICD-10 Code:  No primary diagnosis found.  Referring practitioner: Kvng Wesley,*  Date of Initial Visit: Type: THERAPY  Noted: 2020  Today's Date: 2020  Patient seen for 1 sessions      Subjective:     Subjective Questionnaire: [facility] OT: 15       Subjective Evaluation    History of Present Illness  Date of onset: 2015  Mechanism of injury: Mrs. 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 Pt is  but  works and u/a to assist much during the day. Pt has hired care-giver that provides transportation services along with any needed assist for ADLs/IADLs. Pt requires assist for bathing, dressing, SBA-min A w/transfers w/AFO and set-up assist for grooming and feeding. Pt has diminished sensation and proprioception in both LUE and LE. Pt also has fx'ed L wrist X2 but is now cleared of precautions but prefers to wear wrist cock-up splint for wrist comfort and support. Pt also owns resting hand splint.        Patient Occupation: disabled   Precautions and Work Restrictions: fall; L sided HP; dec. sensation LUE/LLEQuality of life: good    Pain  No pain reported    Social Support  Lives in: one-story house  Lives with: spouse    Hand dominance: right    Treatments  Current treatment: physical therapy  Patient Goals  Patient goals for therapy: improved balance, increased motion, increased strength and independence with ADLs/IADLs  Patient goal: increase function in all tasks      Pt states wanting to improve use of LUE into daily tasks.    Objective       Active Range of Motion   Left Shoulder   Flexion: 2 degrees   Extension: 35 degrees     Left Elbow   Flexion: 65 degrees   Extension: 14 degrees     Left Wrist   Wrist flexion: 33 degrees   Wrist extension: 20 degrees     Additional Active Range of Motion Details  Despite poor  lateral  strength via testing, pt with decent resistance w/lateral pinch when OT attempting to remove object from pt's .    Passive Range of Motion   Left Shoulder   Flexion: 150 degrees     Left Elbow   Flexion: 70 degrees   Extension: 3 degrees     Left Thumb   Flexion     DIP: 35 degrees  Extension     DIP: 5 degrees    Left Digits   Flexion   Index     PIP: 16 degrees  Extension   Middle     DIP: 32 degrees  Ring     PIP: 0 degrees    DIP: 0 degrees  Little     PIP: 0 degrees    DIP: 0 degrees    Additional Passive Range of Motion Details  PROM shoulder flexion limited by pain   Pt's light touch sensation severely impaired, deep pressure intact however proprioception limited with LUE    Strength/Myotome Testing     Left Wrist/Hand      (2nd hand position)     Trial 1: 0 lbs    Trial 2: 0 lbs    Trial 3: 0 lbs    Average: 0 lbs    Thumb Strength  Key/Lateral Pinch     Trial 1: 1 lbs    Trial 2: 1 lbs    Trial 3: 1 lbs    Average: 1 lbs    Right Wrist/Hand      (2nd hand position)     Trial 1: 41 lbs    Trial 2: 43 lbs    Trial 3: 44 lbs    Average: 42.67 lbs    Thumb Strength   Key/Lateral Pinch     Trial 1: 9 lbs    Trial 2: 9 lbs    Trial 3: 10 lbs    Average: 9.33 lbs    Ambulation     Ambulation: Level Surfaces     Additional Level Surfaces Ambulation Details  Pt is non-ambulatory except for w/transfers, able to take 1-3 steps with UE support; uses transport type w/c, peddling with RLE.  Pt min A for STS from edge of w/c surface, good placement of feet and recall to push from arm rest.  Pt with standing tolerance of ~1-2 mins at home during ADL tasks.       OT Neuro         Assessment & Plan     Assessment  Impairments: abnormal coordination, abnormal gait, activity intolerance, impaired balance, impaired physical strength, lacks appropriate home exercise program and safety issue  Assessment details: Pt demonstrates difficulty with LUE function d/t previous CVA resulting in impaired ROM,  strength, sensation and FM/GM coordination. These deficits impact pt's ability to complete simple ADLs and IADLs, requiring use of care-giver to aid with pulling up and donning/doffing pants/UB clothing. Recommend skilled OT services to address these deficits and improve overall independence, safety   Prognosis: good  Functional Limitations: carrying objects, lifting, walking, moving in bed, standing and reaching overhead  Goals  Plan Goals: OT neuro goals    LTGs:     Pt will increase lateral  strength L hand by 2 # by 8 wks to demonstrate improved strength and function for daily tasks.      Pt will complete static standing activity X 6 mins with min support while incorporating LUE into WB'ing activity to simulate ADL/IADL tasks by 8 wks.        STGs:    Pt will be min A with L UE strengthening HEP to increase performance in ADL/IADL tasks by 4 wks.       Pt will complete static standing activity X 3 mins with intermittent min A support while incorporating LUE into WBing tasks to simulate ADL/IADL tasks by 4 wks.     Pt will be min A with sensory re-ed HEP to increase safety and engagement in ADL/IADL tasks by 4 wks.     Pt will be MOD assist with UB dressing tasks to promote independence and efficiency by 4 wks.      OT UE specific goals    Pt will be independent assist with hand stretching HEP to promote increased function and improved comfort level during ADL/IADL tasks by 8 wks.     Pt's caregivers will be independnet assist with LUE stretching HEP to promote increased function and improved comfort level during ADL/IADL tasks by 8 wks.       Plan  Planned therapy interventions: ADL retraining, balance/weight-bearing training, fine motor coordination training, flexibility, functional ROM exercises, home exercise program, motor coordination training, neuromuscular re-education, postural training, strengthening, stretching, therapeutic activities and transfer training  Frequency: 2x week  Duration in visits:  12  Plan details: Est. OT POC and goals to improve pt's engagement, safety and independence into daily tasks and increase pt's overall satisfaction and QOL.         Timed:  Manual Therapy:    0     mins  81049;  Therapeutic Exercise:    0     mins  24796;     Neuromuscular Rosa Isela:    0    mins  72944;    Therapeutic Activity:     0     mins  24559;     Self-Care/ADL     0     mins  07772;   Sensory Int. Tech      0     mins 78009;  Ultrasound:     0     mins  73990;    Electrical Stimulation:    0     mins  20322 ( );    Untimed:  Electrical Stimulation:    0     mins  79667 ( );    Timed Treatment:   0   mins   Total Treatment:     59   mins    OT Signature: Starr Salinas MS, OTR/L, CDP  [place] License #: [id number]  DATE TREATMENT INITIATED: 1/24/2020    Initial Certification Certification Period: 4/23/2020  I certify that the therapy services are furnished while this patient is under my care. The services outlined above are required by this patient and will be reviewed every 90 days.     Physician Signature: __________________________________  Kvng Wesley MD    Please sign and return via fax to [phone]  Thank you,   [facility] Occupational Therapy

## 2020-01-31 ENCOUNTER — TREATMENT (OUTPATIENT)
Dept: PHYSICAL THERAPY | Facility: CLINIC | Age: 70
End: 2020-01-31

## 2020-01-31 DIAGNOSIS — R26.89 BALANCE PROBLEM: Primary | ICD-10-CM

## 2020-01-31 DIAGNOSIS — R26.9 GAIT DIFFICULTY: ICD-10-CM

## 2020-01-31 PROCEDURE — 97112 NEUROMUSCULAR REEDUCATION: CPT | Performed by: PHYSICAL THERAPIST

## 2020-01-31 PROCEDURE — 97110 THERAPEUTIC EXERCISES: CPT | Performed by: PHYSICAL THERAPIST

## 2020-01-31 PROCEDURE — 97112 NEUROMUSCULAR REEDUCATION: CPT | Performed by: OCCUPATIONAL THERAPIST

## 2020-01-31 NOTE — PROGRESS NOTES
Occupational Therapy Daily Progress Note  Visit: 2  Date of Initial Visit: Type: THERAPY  Noted: 2020      Patient: Hodan Beverly   : 1950  Diagnosis/ICD-10 Code:  No primary diagnosis found.  Referring practitioner: Kvng Wesley,*  Date of Initial Visit: Type: THERAPY  Noted: 2020  Today's Date: 2020  Patient seen for 2 sessions      Subjective:   Patient reports: improving movement after stretching  Pain: 0/10  Subjective Questionnaire: n/a  Clinical Progress: improved  Home Program Compliance: Yes  Treatment has included: neuromuscular re-education    Subjective   Objective     OT Neuro         OT Exercises     Row Name 20 1541             Precautions    Existing Precautions/Restrictions  fall L HP  -ST         Subjective Pain    Able to rate subjective pain?  yes  -ST      Pre-Treatment Pain Level  0  -ST      Post-Treatment Pain Level  0  -ST      Subjective Pain Comment  0  -ST         Exercise 1    Exercise Name 1  WB'ing onto LUE with focus on digit extension and with progression toward flexing and extending elbow to push off of mat table for increasing strength and improving tone/ROM (see written HEP for additional details)  -ST         Exercise 2    Exercise Name 2  grasp on small circular item while completing shoulder flexion and abd/adduction for carry over with fxnl UE movement; pt with limited FE movement d/t weakness and requiring intermittent AAROM to perform task as desired   -ST         Exercise 3    Exercise Name 3  hand on unwt'ed ball while performing shoulder f/e and abd/adduction for improving control and accuracy for carry over with fxnl task; pt with cuing to maintain extended digits   -ST         Exercise 4    Exercise Name 4  PROM/AAROM/AROM digit f/e, digits IV and V able to complete minimal digit extension and digit III with slight extension; no active extension w/remaininng digits but all digits able to flex and perform minimal adduction  -ST          Exercise 5    Exercise Name 5  isolated reaching with elbow f/e for fxnl carry over with ADL tasks; pt requires freq. and mult rest breaks d/t muslce fatigue  -ST        User Key  (r) = Recorded By, (t) = Taken By, (c) = Cosigned By    Initials Name Provider Type    Starr Nails OTR Occupational Therapist           Assessment & Plan     Assessment  Assessment details: Pt with significantly improved AAROM/AROM after WB'ing and PROM of wrist, digits and elbow. Pt has most difficulty with digits I-III with active extension but improving strength with digit flexion. Digits IV and V able to complete minimal extension AROM but is an improvement per pt report. Pt requires freq rest breaks d/t muscle fatigue. Pt tolerated all WB'ing and extension with no c/o pain and good outcome.     Plan  Plan details: Continue w/ est. POC and goals to achieve goals and improve overall QOL        Visit Diagnoses:  No diagnosis found.            Timed:  Manual Therapy:    0     mins  19015;  Therapeutic Exercise:    0     mins  28312;     Neuromuscular Rosa Isela:    38    mins  16773;    Therapeutic Activity:     0     mins  16665;     Self-Care/ADL     0     mins  23630;   Sensory Int. Tech      0     mins 26003;  Ultrasound:     0     mins  80492;    Electrical Stimulation:    0     mins  26020 ( );    Untimed:  Electrical Stimulation:    0     mins  56945 ( );    Timed Treatment:   38   mins   Total Treatment:     38   mins    OT Signature: Starr Salinas MS, OTR/L, TRENT  [place] License #: [id number]    Based upon review of the patient's progress and continued therapy plan, it is my medical opinion that Hodan Beverly should continue occupational therapy treatment at [facility]  [address] [address] [phone].    Signature: __________________________________  Kvng Wesley MD

## 2020-01-31 NOTE — PROGRESS NOTES
"Physical Therapy Daily Progress Note  Visit: 3  Date of Initial Visit: Type: THERAPY  Noted: 2020    Hodan Beverly reports: \"I'm not working much at home.\"    Subjective Evaluation    Pain  No pain reported           Objective   See Exercise, Manual, and Modality Logs for complete treatment.    Exercise 1  Exercise Name 1: NuStep R UE and B LE's with gait belt around both legs to decrease L hip external rotation  Equipment/Resistance 1: level 6  Time: 8 min  Exercise 2  Exercise Name 2: Sitting balance on large swiss ball with R LE marching, R UE reaching across midline   Equipment/Resistance 2: large swiss ball, mod A with L UE A  Sets/Reps 2: 10  Exercise 3  Exercise Name 3: Ambulation with treking pole with L double metal upright AFO with total A for L UE A  Equipment/Resistance 3: treking pole; L double metal upright AFO; mod A for gait; step to   Sets/Reps 3: 40' x 2                PT Neuro          Assessment & Plan     Assessment  Assessment details: Pt requires mod A with ambulation using treking pole with L double metal upright AFO.  Pt also demonstrates step to gait pattern and decreased L LE WB.  Pt to benefit from skilled PT services to improve gait, balance, strength, transfers and overall functional mobility.    Plan  Plan details: Pt to continue with PT services to improve gait, balance, strength, transfers and overall functional mobility.          Manual Therapy:     0     mins  96432;  Therapeutic Exercise:     15     mins  64335;     Neuromuscular Rosa Isela:     30    mins  57607;    Therapeutic Activity:      0     mins  51856;     Gait Trainin     mins  41850;     Ultrasound:      0     mins  74527;    Electrical Stimulation:     0     mins  10108 ( );  Dry Needling      0     mins self-pay  Traction      0     mins 38494  Canalith Repositioning    0     mins 84678      Timed Treatment:   45   mins   Total Treatment:     45   mins    Maria Alejandra Layton, PT  [place] License #: " [id number]    Physical Therapist

## 2020-02-05 ENCOUNTER — TREATMENT (OUTPATIENT)
Dept: PHYSICAL THERAPY | Facility: CLINIC | Age: 70
End: 2020-02-05

## 2020-02-05 DIAGNOSIS — Z74.09 IMPAIRED MOBILITY AND ADLS: Primary | ICD-10-CM

## 2020-02-05 DIAGNOSIS — R26.9 GAIT DIFFICULTY: ICD-10-CM

## 2020-02-05 DIAGNOSIS — Z78.9 IMPAIRED MOBILITY AND ADLS: Primary | ICD-10-CM

## 2020-02-05 DIAGNOSIS — R26.89 BALANCE PROBLEM: Primary | ICD-10-CM

## 2020-02-05 DIAGNOSIS — R27.8 DECREASED COORDINATION: ICD-10-CM

## 2020-02-05 DIAGNOSIS — R29.898 DECREASED STRENGTH OF UPPER EXTREMITY: ICD-10-CM

## 2020-02-05 PROCEDURE — 97110 THERAPEUTIC EXERCISES: CPT | Performed by: PHYSICAL THERAPIST

## 2020-02-05 PROCEDURE — 97112 NEUROMUSCULAR REEDUCATION: CPT | Performed by: PHYSICAL THERAPIST

## 2020-02-05 PROCEDURE — 97112 NEUROMUSCULAR REEDUCATION: CPT | Performed by: OCCUPATIONAL THERAPIST

## 2020-02-05 PROCEDURE — 97116 GAIT TRAINING THERAPY: CPT | Performed by: PHYSICAL THERAPIST

## 2020-02-05 NOTE — PROGRESS NOTES
"Occupational Therapy Daily Progress Note  Visit: 3  Date of Initial Visit: Type: THERAPY  Noted: 2020      Patient: Hodan Beverly   : 1950  Diagnosis/ICD-10 Code:  No primary diagnosis found.  Referring practitioner: Kvng Wesley,*  Date of Initial Visit: Type: THERAPY  Noted: 2020  Today's Date: 2020  Patient seen for 3 sessions      Subjective:   Patient reports: \"My hand feels less tight but I definitely had some soreness after less session\"  Pain: 0/10  Subjective Questionnaire: n/a  Clinical Progress: improved  Home Program Compliance: Yes  Treatment has included: neuromuscular re-education    Subjective Evaluation    History of Present Illness    Subjective comment: \"My hand feels less tight\"     Objective     OT Neuro            OT Exercises     Row Name 20 1115             Subjective Comments    Subjective Comments  \"I could feel some soreness after last session:  -ST         Subjective Pain    Able to rate subjective pain?  yes  -ST      Pre-Treatment Pain Level  0  -ST      Post-Treatment Pain Level  0  -ST         Exercise 1    Exercise Name 1  WB'ing onto LUE while hand placed flat on ball (OT required to provide intermittent [place] A) while completing shoulder f/e and shoulder abd/adduction  -ST         Exercise 2    Exercise Name 2  WB'ing LUE onto mat table with increasing elbow f/e; progressed to elbow and forearm fully flat on table with pt requiring to push back up to midline using LUE  -ST      Sets 2  2  -ST      Reps 2  10  -ST         Exercise 3    Exercise Name 3  L digits flexed about EOM table while addressing WB'ing and strengthening by completing trunk f/e with use of LUE aiding w/pulling and pushing through elbow extension  -ST         Exercise 4    Exercise Name 4  B hands on unweighted ball; intermittent [place] A w/L hand to prevent digit flexion with grasping ball and performing shoulder flexion and horizontal abd/adduction  -ST      Sets 4  2  -ST   "    Reps 4  15  -ST         Exercise 5    Exercise Name 5  B hands on unweighted ball with focus on elbow f/e, requiring "[employer] A to avoid loss of  along with intermittent aid with elbow flexion d/t weakness  -ST        User Key  (r) = Recorded By, (t) = Taken By, (c) = Cosigned By    Initials Name Provider Type    Starr Nails OTR Occupational Therapist          Assessment & Plan     Assessment  Impairments: abnormal coordination, abnormal gait, abnormal muscle tone, abnormal or restricted ROM, activity intolerance, impaired balance, impaired physical strength, lacks appropriate home exercise program and safety issue  Assessment details: Pt with improved tone L hand from last session, tolerated increased WB'ing onto LUE with ability to fully place forearm on table and push up, demonstrating increased firing of tricep. Pt's digit extension improves significantly after WB'ing activity but continues to have poor ability to release grasp and form digit extension actively. Pt brought her resting hand splint from home, splint with excellent fit and support. Advised pt to increase wear schedule with resting hand splint to eventually replace it w/wrist cock-up for improving digit positioning. Pt agreeable and with good understanding.   Prognosis: good  Functional Limitations: carrying objects, lifting, walking, pulling, pushing, moving in bed, standing, reaching behind back, reaching overhead and unable to perform repetitive tasks  Plan  Planned therapy interventions: ADL retraining, balance/weight-bearing training, fine motor coordination training, flexibility, functional ROM exercises, home exercise program, motor coordination training, neuromuscular re-education, postural training, strengthening, stretching, therapeutic activities and transfer training  Frequency: 1x week  Plan details: Continue OT POC and goals as established with continued focus on WB'ing, ROM, strengthening and grasp/release control to  improve overall daily function in ADLs and IADLs.        Visit Diagnoses:  No diagnosis found.        Timed:  Manual Therapy:    0     mins  89104;  Therapeutic Exercise:    0     mins  26953;     Neuromuscular Rosa Isela:    43    mins  64919;    Therapeutic Activity:     0     mins  06450;     Self-Care/ADL     0     mins  94613;   Sensory Int. Tech      0     mins 05862;  Ultrasound:     0     mins  04162;    Electrical Stimulation:    0     mins  04611 ( );    Untimed:  Electrical Stimulation:    0     mins  68347 ( );    Timed Treatment:   43   mins   Total Treatment:     43   mins    OT Signature: Starr Salinas MS, OTR/L, CDP  [place] License #: [id number]

## 2020-02-05 NOTE — PROGRESS NOTES
"Physical Therapy Daily Progress Note  Visit: 4  Date of Initial Visit: Type: THERAPY  Noted: 1/14/2020    Hodan Beverly reports: \"My left leg was sore after we worked out the other day.\"    Subjective Evaluation    Pain  No pain reported           Objective   See Exercise, Manual, and Modality Logs for complete treatment.    Exercise 1  Exercise Name 1: NuStep R UE and B LE's with gait belt around both legs to decrease L hip external rotation  Equipment/Resistance 1: level 6  Time: 8 min  Exercise 2  Exercise Name 2: St balance in parallel bars without UE A with empahsis on leaning left hip onto the bar and holding that with R LE stepping to 6\" step   Equipment/Resistance 2: parallel bars; 6\" step; mod/max A  Sets/Reps 2: 3 sets of 10 with seated rest break in between  Exercise 3  Exercise Name 3: Ambulation in bars with treking pole fwd/bwd with emphasis on increased L LE WB/wt shift and keeping more upright trunk.  Ambulation with pole and L AFO.  Equipment/Resistance 3: treking pole; L AFO  Sets/Reps 3: 40' x 2                PT Neuro          Assessment & Plan     Assessment  Assessment details: Pt requires mod/max A with L sided wt shift in parallel bars with emphasis on upright trunk and L LE WB.  Pt demonstrates L ankle supination and PF in all activities and would benefit from L ankle Dynasplint to increase PROM/AROM for WB/gait activities.  Pt unable to WB through L ankle secondary to ankle supination with PF.  Pt to benefit from skilled PT services to meet goals and improve functional mobility.    Plan  Plan details: Pt to benefit from skilled PT services to improve gait, balance, strength, transfers, and overall functional mobility.          Manual Therapy:     0     mins  19942;  Therapeutic Exercise:     8     mins  11285;     Neuromuscular Rosa Isela:     20    mins  66831;    Therapeutic Activity:      0     mins  50273;     Gait Training:       15     mins  06199;     Ultrasound:      0     mins  77464;  "   Electrical Stimulation:     0     mins  81563 ( );  Dry Needling      0     mins self-pay  Traction      0     mins 86751  Canalith Repositioning    0     mins 53201      Timed Treatment:   43   mins   Total Treatment:     43   mins    Maria Alejandra Layton, PT  [place] License #: [id number]    Physical Therapist

## 2020-02-12 ENCOUNTER — TREATMENT (OUTPATIENT)
Dept: PHYSICAL THERAPY | Facility: CLINIC | Age: 70
End: 2020-02-12

## 2020-02-12 DIAGNOSIS — R27.8 DECREASED COORDINATION: ICD-10-CM

## 2020-02-12 DIAGNOSIS — Z78.9 IMPAIRED MOBILITY AND ADLS: Primary | ICD-10-CM

## 2020-02-12 DIAGNOSIS — R26.9 GAIT DIFFICULTY: ICD-10-CM

## 2020-02-12 DIAGNOSIS — R26.89 BALANCE PROBLEM: Primary | ICD-10-CM

## 2020-02-12 DIAGNOSIS — Z74.09 IMPAIRED MOBILITY AND ADLS: Primary | ICD-10-CM

## 2020-02-12 DIAGNOSIS — R29.898 DECREASED STRENGTH OF UPPER EXTREMITY: ICD-10-CM

## 2020-02-12 PROCEDURE — 97112 NEUROMUSCULAR REEDUCATION: CPT | Performed by: PHYSICAL THERAPIST

## 2020-02-12 PROCEDURE — 97112 NEUROMUSCULAR REEDUCATION: CPT | Performed by: OCCUPATIONAL THERAPIST

## 2020-02-12 PROCEDURE — 97116 GAIT TRAINING THERAPY: CPT | Performed by: PHYSICAL THERAPIST

## 2020-02-12 PROCEDURE — 97110 THERAPEUTIC EXERCISES: CPT | Performed by: PHYSICAL THERAPIST

## 2020-02-12 NOTE — PROGRESS NOTES
"Occupational Therapy Daily Progress Note  Visit: 4  Date of Initial Visit: Type: THERAPY  Noted: 2020      Patient: Hodan Beverly   : 1950  Diagnosis/ICD-10 Code:  Impaired mobility and ADLs [Z74.09]  Referring practitioner: Kvng Wesley,*  Date of Initial Visit: Type: THERAPY  Noted: 2020  Today's Date: 2020  Patient seen for 4 sessions      Subjective:   Patient reports: \"I just don't feel myself today\"  Pain: 0/10  Subjective Questionnaire: n/a  Clinical Progress: improved  Home Program Compliance: Yes  Treatment has included: neuromuscular re-education    Subjective   Objective     OT Neuro         OT Exercises     Row Name 20 1112             Subjective Comments    Subjective Comments  \"I just don't feel myself today\"  -ST         Subjective Pain    Able to rate subjective pain?  yes  -ST      Pre-Treatment Pain Level  0  -ST      Post-Treatment Pain Level  0  -ST         Exercise 1    Exercise Name 1  WB'ing onto LUE while hand placed flat on ball (OT required to provide intermittent [place] A) while completing shoulder f/e and shoulder abd/adduction while seated on dyanamic air disc for addressing core stability and strengthening   -ST         Exercise 2    Exercise Name 2  WB'ing onto LUE onto mat table while seated on dynamic air disc while using R hand to retrieve resistive clips with R hand, cross midline and place in varying plns to challenge balance and core stability   -ST         Exercise 3    Exercise Name 3  WB'ing onto LUE onto mat table while seated on dynamic air disc while using R hand, cross midline and retrieve cones from varying heights to challenge core stability, balance and improve tone in L hand during wb'ing   -ST         Exercise 4    Exercise Name 4  focused L hand and digit extension while placed on ball, progressed to grasp and release using ball, assist required for extension d/t tone and weakness  -ST         Exercise 5    Exercise Name 5  " focused LUE shoulder f/e and abd adduction while L hand on ball, OT providing intermittent min A for WB'ing and maintaining digit extension  -ST        User Key  (r) = Recorded By, (t) = Taken By, (c) = Cosigned By    Initials Name Provider Type    Starr Nails OTR Occupational Therapist           Assessment & Plan     Assessment  Assessment details: Pt with improved tolerance to WB'ing and stretching with LUE; no c/o pain. Pt continues to have difficulty with digit extension and control of grasp. Pt however able to complete intermittent control of digit extension digits III-V however no active movement toward extension in thumb. Pt accommodated well to dynamic air surface for trunk stability and strengthening. Will continue to progress difficulty and focused control of grasp, release and UE movement for carry over in ADL and IADL tasks     Plan  Plan details: Continue POC and goals         Visit Diagnoses:    ICD-10-CM ICD-9-CM   1. Impaired mobility and ADLs Z74.09 799.89   2. Decreased strength of upper extremity R29.898 729.89   3. Decreased coordination R27.9 781.3            Timed:  Manual Therapy:    0     mins  67706;  Therapeutic Exercise:    0     mins  15235;     Neuromuscular Rosa Isela:    44    mins  93773;    Therapeutic Activity:     0     mins  41357;     Self-Care/ADL     0     mins  82126;   Sensory Int. Tech      0     mins 19724;  Ultrasound:     0     mins  35670;    Electrical Stimulation:    0     mins  33912 ( );    Untimed:  Electrical Stimulation:    0     mins  33162 ( );    Timed Treatment:   44   mins   Total Treatment:     44   mins    OT Signature: Starr Salinas MS, OTR/L, CDP  [place] License #: [id number]

## 2020-02-26 ENCOUNTER — TREATMENT (OUTPATIENT)
Dept: PHYSICAL THERAPY | Facility: CLINIC | Age: 70
End: 2020-02-26

## 2020-02-26 DIAGNOSIS — R26.9 GAIT DIFFICULTY: ICD-10-CM

## 2020-02-26 DIAGNOSIS — Z78.9 IMPAIRED MOBILITY AND ADLS: Primary | ICD-10-CM

## 2020-02-26 DIAGNOSIS — R27.8 DECREASED COORDINATION: ICD-10-CM

## 2020-02-26 DIAGNOSIS — Z74.09 IMPAIRED MOBILITY AND ADLS: Primary | ICD-10-CM

## 2020-02-26 DIAGNOSIS — R29.898 DECREASED STRENGTH OF UPPER EXTREMITY: ICD-10-CM

## 2020-02-26 DIAGNOSIS — R26.89 BALANCE PROBLEM: Primary | ICD-10-CM

## 2020-02-26 PROCEDURE — 97530 THERAPEUTIC ACTIVITIES: CPT | Performed by: OCCUPATIONAL THERAPIST

## 2020-02-26 PROCEDURE — 97110 THERAPEUTIC EXERCISES: CPT | Performed by: PHYSICAL THERAPIST

## 2020-02-26 PROCEDURE — 97112 NEUROMUSCULAR REEDUCATION: CPT | Performed by: PHYSICAL THERAPIST

## 2020-02-26 PROCEDURE — 97112 NEUROMUSCULAR REEDUCATION: CPT | Performed by: OCCUPATIONAL THERAPIST

## 2020-02-26 NOTE — PROGRESS NOTES
"Physical Therapy Daily Progress Note  Visit: 6  Date of Initial Visit: Type: THERAPY  Noted: 2020    Hodan Beverly reports: \"I have a headache this morning.\"    Subjective Evaluation    Pain  No pain reported           Objective   See Exercise, Manual, and Modality Logs for complete treatment.    Exercise 1  Exercise Name 1: NuStep R UE and B LE's with gait belt around both legs to decrease L hip external rotation  Equipment/Resistance 1: level 6  Time: 8 min  Exercise 2  Exercise Name 2: Tall kneeling with chair in front on mat table; R UE reaching across midline and tall kneeling without UE A  Equipment/Resistance 2: chair; min/mod A to get into position and min A with R UE reaching across midline  Sets/Reps 2: 5 min  Exercise 3  Exercise Name 3: R sidelying with L LE hip flex/ext, knee flex/ext   Equipment/Resistance 3: powder board; min/mod A L LE  Sets/Reps 3: 10  Exercise 4  Exercise Name 4: B rolling; scooting  Equipment/Resistance 4: min A and VCing  Sets/Reps 4: 2  Exercise 5  Exercise Name 5: Laying on left side with patient attempting to rest near the edge of the mat and \"relax\" and feel comfortable on the left side.  Equipment/Resistance 5: min A; mod VCing            PT Neuro          Assessment & Plan     Assessment  Assessment details: Pt requires mod A with B rolling and tall kneeling activities.  Pt fearful of L sided WB and rolling towards the right secondary to fear of falling.  Pt to benefit from skilled PT services to improve gait, balance, strength and overall functional mobility.    Plan  Plan details: Pt to continue with PT services to improve gait, balance, strength, transfers and overall functional mobility.          Manual Therapy:     0     mins  87969;  Therapeutic Exercise:     30     mins  15094;     Neuromuscular Rosa Isela:     15    mins  45208;    Therapeutic Activity:      0     mins  07787;     Gait Trainin     mins  39725;     Ultrasound:      0     mins  64451;  "   Electrical Stimulation:     0     mins  54633 ( );  Dry Needling      0     mins self-pay  Traction      0     mins 75661  Canalith Repositioning    0     mins 64061      Timed Treatment:   45   mins   Total Treatment:     45   mins    Maria Alejandra Layton, PT  [place] License #: [id number]    Physical Therapist

## 2020-02-26 NOTE — PROGRESS NOTES
"OT Re-Assessment / Re-Certification        Patient: Hodan Beverly   : 1950  Diagnosis/ICD-10 Code:  Impaired mobility and ADLs [Z74.09]  Referring practitioner: Kvng Wesley,*  Date of Initial Visit: Type: THERAPY  Noted: 2020  Today's Date: 2020  Patient seen for 5 sessions      Subjective:   Patient reports: Pt states \"movement is getting easier, for example with getting my L arm in and out of my sleeve\"   Pain: 0/10  Subjective Questionnaire: n/a; see ROM and  testing  Clinical Progress: improved  Home Program Compliance: Yes  Treatment has included: therapeutic exercise and neuromuscular re-education    Subjective   Objective       Active Range of Motion   Left Shoulder   Flexion: 45 degrees   Extension: 28 degrees     Left Elbow   Flexion: 45 degrees   Extension: 10 degrees     Left Wrist   Wrist flexion: 40 degrees   Wrist extension: 25 degrees     Passive Range of Motion   Left Shoulder   Flexion: 150 degrees   Extension: 20 degrees     Left Elbow   Flexion: 150 degrees   Extension: 0 degrees     Left Wrist   Wrist flexion: 50 degrees   Wrist extension: 60 degrees     Strength/Myotome Testing     Left Wrist/Hand     Thumb Strength  Key/Lateral Pinch     Trial 1: 3 lbs    Trial 2: 1 lbs    Trial 3: 1 lbs    Average: 1.67 lbs       OT Neuro        OT Exercises     Row Name 20 1115             Precautions    Existing Precautions/Restrictions  fall L HP with upright brace  -ST         Subjective Comments    Subjective Comments  \"I do notice a difference in my arm movement\"  -ST         Subjective Pain    Able to rate subjective pain?  yes  -ST      Pre-Treatment Pain Level  0  -ST      Post-Treatment Pain Level  0  -ST         Exercise 1    Exercise Name 1  OT re-assessment completed per POC, see flow sheet for additional details along with media tab for written HEP  -ST         Exercise 2    Exercise Name 2  sustained LUE stretching and pt progressing to AAROM/AROM of all " LUE joints for strengthening and improving fxnl use  -ST         Exercise 3    Exercise Name 3  use of 1# therabar, OT stabilizing pt's L hand while pt using LUE as able with RUE guiding movement into desired plns; bicep curls, wrist f/e, shoulder FE to ~45 degrees; OT then progressed to assisting LUE to achieve increased ROM   -ST      Sets 3  3  -ST      Reps 3  10  -ST        User Key  (r) = Recorded By, (t) = Taken By, (c) = Cosigned By    Initials Name Provider Type    Starr Nails OTR Occupational Therapist        Assessment & Plan     Assessment  Impairments: abnormal coordination, abnormal gait, activity intolerance, impaired balance, impaired physical strength, lacks appropriate home exercise program and safety issue  Assessment details: OT re-assessment completed per POC. Pt met 2 goals and is progressing toward all others. Pt's complexity of symptoms and PLOF make progress slower than expected but pt did improve AROM in all areas. Pt also improving dynamic sitting balance when completing WB'ing, reaching, etc. Will continue to progress pt in stretching, strengthening, ROM protocols and improve overall function, control and use of LUE into daily tasks.   Prognosis: good  Functional Limitations: carrying objects, lifting, walking, moving in bed, standing and reaching overhead  Goals  Plan Goals: OT neuro goals    LTGs:     Pt will increase lateral  strength L hand by 2 # by 8 wks to demonstrate improved strength and function for daily tasks; IMPROVING BUT NOT MET      Pt will complete static standing activity X 6 mins with min support while incorporating LUE into WB'ing activity to simulate ADL/IADL tasks by 8 wks; PROGRESSING, NOT MET       STGs:    Pt will be min A with L UE strengthening HEP to increase performance in ADL/IADL tasks by 4 wks; PARTIALLY MET, CONTINUING TO ADDRESS DIFFERENT TECHNIQUES AND POSITIONS       Pt will complete static standing activity X 3 mins with intermittent min  A support while incorporating LUE into WBing tasks to simulate ADL/IADL tasks by 4 wks; NOT MET     Pt will be min A with sensory re-ed HEP to increase safety and engagement in ADL/IADL tasks by 4 wks; NOT MET     Pt will be MOD assist with UB dressing tasks to promote independence and efficiency by 4 wks; NOT MET      OT UE specific goals    Pt will be independent assist with hand stretching HEP to promote increased function and improved comfort level during ADL/IADL tasks by 8 wks; MET, GETS ASSIST AS NEEDED FROM HIRED HELP/SPOUSE     Pt's caregivers will be independnet assist with LUE stretching HEP to promote increased function and improved comfort level during ADL/IADL tasks by 8 wks; MET; DOES HEPs REGULARLY       Plan  Planned therapy interventions: ADL retraining, balance/weight-bearing training, fine motor coordination training, flexibility, functional ROM exercises, home exercise program, motor coordination training, neuromuscular re-education, postural training, strengthening, stretching, therapeutic activities and transfer training  Frequency: 1x week  Duration in visits: 8  Plan details: Recommend continuing POC and goals as previously established to address pt's impairments and to improve overall engagement, safety and satisfaction w/daily tasks.         Visit Diagnoses:    ICD-10-CM ICD-9-CM   1. Impaired mobility and ADLs Z74.09 799.89   2. Decreased strength of upper extremity R29.898 729.89   3. Decreased coordination R27.9 781.3       Progress toward previous goals: Partially Met      Recommendations: Continue as planned  Timeframe: 1 month  Prognosis to achieve goals: good    OT Signature: Starr Salinas MS, OTR/L, CDP  [place] License #: [id number]    Based upon review of the patient's progress and continued therapy plan, it is my medical opinion that Hodan Beverly should continue occupational therapy treatment at [facility]  [facility] [address]  [place] [address]  [phone].    Signature: __________________________________  Kvng Wesley MD    Timed:  Manual Therapy:    0     mins  05003;  Therapeutic Exercise:    0     mins  44738;     Neuromuscular Rosa Isela:    17    mins  03040;    Therapeutic Activity:     30     mins  03798;     Self-Care/ADL     0     mins  02654;   Sensory Int. Tech      0     mins 85826;  Ultrasound:     0     mins  03819;    Electrical Stimulation:    0     mins  49031 ( );    Untimed:  Electrical Stimulation:    0     mins  70768 ( );    Timed Treatment:   47   mins   Total Treatment:     47   mins

## 2020-03-04 ENCOUNTER — TREATMENT (OUTPATIENT)
Dept: PHYSICAL THERAPY | Facility: CLINIC | Age: 70
End: 2020-03-04

## 2020-03-04 DIAGNOSIS — R29.898 DECREASED STRENGTH OF UPPER EXTREMITY: ICD-10-CM

## 2020-03-04 DIAGNOSIS — R26.89 BALANCE PROBLEM: ICD-10-CM

## 2020-03-04 DIAGNOSIS — R26.9 GAIT DIFFICULTY: Primary | ICD-10-CM

## 2020-03-04 DIAGNOSIS — Z74.09 IMPAIRED MOBILITY AND ADLS: Primary | ICD-10-CM

## 2020-03-04 DIAGNOSIS — R27.8 DECREASED COORDINATION: ICD-10-CM

## 2020-03-04 DIAGNOSIS — Z78.9 IMPAIRED MOBILITY AND ADLS: Primary | ICD-10-CM

## 2020-03-04 PROCEDURE — 97112 NEUROMUSCULAR REEDUCATION: CPT | Performed by: PHYSICAL THERAPIST

## 2020-03-04 PROCEDURE — 97110 THERAPEUTIC EXERCISES: CPT | Performed by: PHYSICAL THERAPIST

## 2020-03-04 PROCEDURE — 97112 NEUROMUSCULAR REEDUCATION: CPT | Performed by: OCCUPATIONAL THERAPIST

## 2020-03-04 NOTE — PROGRESS NOTES
"Physical Therapy Daily Progress Note  Visit: 7  Date of Initial Visit: Type: THERAPY  Noted: 2020    Hodan Beveryl reports: \"I'm doing well today.\"    Subjective Evaluation    Pain  No pain reported           Objective   See Exercise, Manual, and Modality Logs for complete treatment.    Exercise 1  Exercise Name 1: NuStep R UE and B LE's with gait belt around both legs to decrease L hip external rotation  Equipment/Resistance 1: level 6  Time: 10 min  Exercise 2  Exercise Name 2: ST balance with B UE A on TRX with mini-squats, no R UE A and then L UE pushing TRX fwd and then pulling back; R LE on 6\" step; L LE stepping up to and off 6\" step; mini-squat  Equipment/Resistance 2: 6\" step; B TRX; L AFO; mod/max A  Sets/Reps 2: 10 of each with two seated rest breaks secondary to fatigue            PT Neuro          Assessment & Plan     Assessment  Assessment details: Pt demonstrates decreased L LE WB with standing and requires constant VCing to keep chest upright and increase L wt shift.  Pt continues to have L LE instability in stance secondary to L ankle supination, L genu recurvatum, L hip flexion.  Pt has moderate L sided disregard with all activities.  Pt to benefit from skilled PT services to meet goals and improve functional mobility.    Plan  Plan details: Pt to continue with PT services to improve gait, balance, strength, transfers and overall functional mobility.          Manual Therapy:     0     mins  37706;  Therapeutic Exercise:     10     mins  69136;     Neuromuscular Rosa Isela:     30    mins  21398;    Therapeutic Activity:      0     mins  25972;     Gait Trainin     mins  41735;     Ultrasound:      0     mins  75612;    Electrical Stimulation:     0     mins  95236 ( );  Dry Needling      0     mins self-pay  Traction      0     mins 16086  Canalith Repositioning    0     mins 48370      Timed Treatment:   40   mins   Total Treatment:     40   mins    Maria Alejandra Layton, PT  [place] " License #: [id number]    Physical Therapist

## 2020-03-04 NOTE — PROGRESS NOTES
"Occupational Therapy Daily Progress Note  Visit: 6  Date of Initial Visit: Type: THERAPY  Noted: 2020      Patient: Hodan Beverly   : 1950  Diagnosis/ICD-10 Code:  Impaired mobility and ADLs [Z74.09]  Referring practitioner: Kvng Wesley,*  Date of Initial Visit: Type: THERAPY  Noted: 2020  Today's Date: 3/4/2020  Patient seen for 6 sessions      Subjective:   Patient reports: \"I like the stretches you gave me last time\"  Pain: 0/10  Subjective Questionnaire: n/a  Clinical Progress: unchanged  Home Program Compliance: Yes  Treatment has included: neuromuscular re-education    Subjective   Objective     OT Neuro         OT Exercises     Row Name 20 1115             Precautions    Existing Precautions/Restrictions  fall L HP w/upright brace  -ST         Subjective Pain    Able to rate subjective pain?  yes  -ST      Pre-Treatment Pain Level  0  -ST      Post-Treatment Pain Level  0  -ST         Exercise 1    Exercise Name 1  UE WB'ing w/crossing midline LUE into wrist and digit extension, pulsing for strengthening of elbow f/e and aiding with tone   -ST         Exercise 2    Exercise Name 2  isloated L shoulder f/e and ab/adduction on ball applying weight through elbow and wrist   -ST         Exercise 3    Exercise Name 3  isolated grasp and release rectangular object L hand with picking up and placing incorporating shoulder f/e and abd/adduction; manual assist for release of grasp digits I-III   -ST         Exercise 4    Exercise Name 4  [place] A for grasping ball and tossing at rebounder   -ST         Exercise 5    Exercise Name 5  use of unwt'ed therabar to guide LUE movement using RUE for shoulder flexion, abd/adduction, elbow and wrist f/e   -ST        User Key  (r) = Recorded By, (t) = Taken By, (c) = Cosigned By    Initials Name Provider Type    Starr Nails OTR Occupational Therapist           Assessment & Plan     Assessment  Assessment details: Pt improving with " self-guided stretching and SROM. Continues to lee tone and weakness impacting fnxl movement patterns. Pt's wrist more flexibility and with improved mobility s/p WB'ing. Grasp limited with digits I-III d/t tone. Will continue to progress independence and fxnl movement.     Plan  Plan details: Continue POC as established.         Visit Diagnoses:    ICD-10-CM ICD-9-CM   1. Impaired mobility and ADLs Z74.09 799.89   2. Decreased strength of upper extremity R29.898 729.89   3. Decreased coordination R27.9 781.3             Timed:  Manual Therapy:    0     mins  57045;  Therapeutic Exercise:    0     mins  17661;     Neuromuscular Rosa Isela:    38    mins  70653;    Therapeutic Activity:     0     mins  41016;     Self-Care/ADL     0     mins  20282;   Sensory Int. Tech      0     mins 07736;  Ultrasound:     0     mins  48314;    Electrical Stimulation:    0     mins  70092 ( );    Untimed:  Electrical Stimulation:    0     mins  83899 ( );    Timed Treatment:   38   mins   Total Treatment:     38   mins    OT Signature: Starr Salinas MS, OTR/L, CDP  [place] License #: [id number]

## 2020-03-11 ENCOUNTER — TREATMENT (OUTPATIENT)
Dept: PHYSICAL THERAPY | Facility: CLINIC | Age: 70
End: 2020-03-11

## 2020-03-11 DIAGNOSIS — R29.898 DECREASED STRENGTH OF UPPER EXTREMITY: ICD-10-CM

## 2020-03-11 DIAGNOSIS — R26.9 GAIT DIFFICULTY: Primary | ICD-10-CM

## 2020-03-11 DIAGNOSIS — R26.89 BALANCE PROBLEM: ICD-10-CM

## 2020-03-11 DIAGNOSIS — Z74.09 IMPAIRED MOBILITY AND ADLS: Primary | ICD-10-CM

## 2020-03-11 DIAGNOSIS — R27.8 DECREASED COORDINATION: ICD-10-CM

## 2020-03-11 DIAGNOSIS — R20.8 DECREASED SENSATION OF HAND AND ARM: ICD-10-CM

## 2020-03-11 DIAGNOSIS — Z78.9 IMPAIRED MOBILITY AND ADLS: Primary | ICD-10-CM

## 2020-03-11 PROCEDURE — 97112 NEUROMUSCULAR REEDUCATION: CPT | Performed by: OCCUPATIONAL THERAPIST

## 2020-03-11 PROCEDURE — 97110 THERAPEUTIC EXERCISES: CPT | Performed by: PHYSICAL THERAPIST

## 2020-03-11 PROCEDURE — 97116 GAIT TRAINING THERAPY: CPT | Performed by: PHYSICAL THERAPIST

## 2020-03-11 PROCEDURE — 97112 NEUROMUSCULAR REEDUCATION: CPT | Performed by: PHYSICAL THERAPIST

## 2020-03-11 NOTE — PROGRESS NOTES
"Occupational Therapy Daily Progress Note  Visit: 7  Date of Initial Visit: Type: THERAPY  Noted: 2020      Patient: Hodan Beverly   : 1950  Diagnosis/ICD-10 Code:  Impaired mobility and ADLs [Z74.09]  Referring practitioner: Kvng Wesley,*  Date of Initial Visit: Type: THERAPY  Noted: 2020  Today's Date: 3/11/2020  Patient seen for 7 sessions      Subjective:   Patient reports: \"I've been working on these stretches as much as I can\"  Pain: 0/10  Subjective Questionnaire: n/a  Clinical Progress: unchanged  Home Program Compliance: Yes  Treatment has included: neuromuscular re-education    Subjective   Objective     OT Neuro         OT Exercises     Row Name 20 1115             Precautions    Existing Precautions/Restrictions  fall L upright metal AFO; LHP   -ST         Subjective Pain    Able to rate subjective pain?  yes  -ST      Pre-Treatment Pain Level  0  -ST      Post-Treatment Pain Level  0  -ST         Exercise 1    Exercise Name 1  LUE WB;ing onto therapy ball incorporating trunk flexion and focus on digit extension, wrist extension and elbow f/e pulsating movement to aid with tone reduction and strengthening by pushing through UE  -ST         Exercise 2    Exercise Name 2  L shoulder f/e and abd/adduction using wt'ed ball for strengthening and improving control of all movement, OT providing over pressure for maintaining desire grasp of digit extension  -ST         Exercise 3    Exercise Name 3  LUE fxnl reaching with grasp of object L hand, incorporating shoulder f/e and abd/adduction while holding object   -ST         Exercise 4    Exercise Name 4  grasp/release L hand, attempts at improving control and reaction speed, requiring [place] A for release, improving grasp control  -ST         Exercise 5    Exercise Name 5  use of unwt'ed therabar, R hand guiding L for wrist f/e, elbow f/e and shoulder f/e, intermittent [place] A for L hand grasp  -ST        User Key  (r) = Recorded " By, (t) = Taken By, (c) = Cosigned By    Initials Name Provider Type    Starr Nails OTR Occupational Therapist           Assessment & Plan     Assessment  Assessment details: Tone in fingers continues to impact grasp/release control and digit extension, requiring over pressure/[place] A. Pt improving with WB'ing onto LUE while seated on dynamic air disc surface to increase complexity of balance and trunk control. Continue to         Visit Diagnoses:    ICD-10-CM ICD-9-CM   1. Impaired mobility and ADLs Z74.09 799.89   2. Decreased strength of upper extremity R29.898 729.89   3. Decreased coordination R27.9 781.3   4. Decreased sensation of hand and arm R20.8 782.0             Timed:  Manual Therapy:    0     mins  84807;  Therapeutic Exercise:    0     mins  63195;     Neuromuscular Rosa Isela:    45    mins  16467;    Therapeutic Activity:     0     mins  47790;     Self-Care/ADL     0     mins  39570;   Sensory Int. Tech      0     mins 93216;  Ultrasound:     0     mins  29846;    Electrical Stimulation:    0     mins  72752 ( );    Untimed:  Electrical Stimulation:    0     mins  27859 ( );    Timed Treatment:   45   mins   Total Treatment:     45   mins    OT Signature: Starr Salinas MS, OTR/L, CDP  [place] License #: [id number]

## 2020-03-11 NOTE — PROGRESS NOTES
"Physical Therapy Daily Progress Note  Visit: 8  Date of Initial Visit: Type: THERAPY  Noted: 1/14/2020    Hodan Beverly reports: \"The brace is going well.  I don't know if I've gained any range of motion.\"    Subjective Evaluation    Pain  No pain reported           Objective   See Exercise, Manual, and Modality Logs for complete treatment.    Exercise 1  Exercise Name 1: NuStep R UE and B LE's with gait belt around both legs to decrease L hip external rotation  Equipment/Resistance 1: level 6  Time: 8 min  Exercise 2  Exercise Name 2: ST balance with emphasis on increased L LE WB with toss/catch ball on/off rebounder; repeat with R LE stepping to blue disc and toss/catch ball on/off rebounder  Equipment/Resistance 2: rebounder; blue disc; ball; min/mod A; total A for L UE to assist in catching the ball  Sets/Reps 2: 4  Time 2: 10  Exercise 3  Exercise Name 3: St balance with R LE stepping onto/off blue disc with emphasis on upright trunk, increased L LE WB with hip and ankle/knee alignment  Equipment/Resistance 3: blue foam; mod A  Sets/Reps 3: 2  Time 3: 10  Exercise 4  Exercise Name 4: Ambulation with treking pole in R UE A and total A for L UE assist, with emphasis on R LE step through gait pattern, more narrow [place] and increased upright trunk with L LE WB  Equipment/Resistance 4: treking pole; min/mod A  Sets/Reps 4: 150'          PT Neuro          Assessment & Plan     Assessment  Assessment details: Pt requires min/mod A with ambulation and standing balance.  Pt requires mod A for R sided wt shift and VCing to perform ambulation with more narrow [place], R LE step through gait pattern, L hip progressing towards neutral and out of flexion in mid to terminal stance.  Pt to benefit from skilled PT services to improve gait, balance, strength and overall functional mobility.    Plan  Plan details: Pt to continue with PT services to improve gait, balance, strength, transfers and overall functional " mobility.          Manual Therapy:     0     mins  27299;  Therapeutic Exercise:     10     mins  50460;     Neuromuscular Rosa Isela:     20    mins  13221;    Therapeutic Activity:      0     mins  07482;     Gait Training:       10     mins  60980;     Ultrasound:      0     mins  10084;    Electrical Stimulation:     0     mins  19587 ( );  Dry Needling      0     mins self-pay  Traction      0     mins 34423  Canalith Repositioning    0     mins 97621      Timed Treatment:   40   mins   Total Treatment:     40   mins    Maria Alejandra Layton, PT  [place] License #: [id number]    Physical Therapist

## 2020-05-06 ENCOUNTER — TREATMENT (OUTPATIENT)
Dept: PHYSICAL THERAPY | Facility: CLINIC | Age: 70
End: 2020-05-06

## 2020-05-06 DIAGNOSIS — R27.8 DECREASED COORDINATION: ICD-10-CM

## 2020-05-06 DIAGNOSIS — R20.8 DECREASED SENSATION OF HAND AND ARM: ICD-10-CM

## 2020-05-06 DIAGNOSIS — Z78.9 IMPAIRED MOBILITY AND ADLS: Primary | ICD-10-CM

## 2020-05-06 DIAGNOSIS — R29.898 DECREASED STRENGTH OF UPPER EXTREMITY: ICD-10-CM

## 2020-05-06 DIAGNOSIS — R26.89 BALANCE PROBLEM: ICD-10-CM

## 2020-05-06 DIAGNOSIS — Z74.09 IMPAIRED MOBILITY AND ADLS: Primary | ICD-10-CM

## 2020-05-06 DIAGNOSIS — R26.9 GAIT DIFFICULTY: Primary | ICD-10-CM

## 2020-05-06 PROCEDURE — 97530 THERAPEUTIC ACTIVITIES: CPT | Performed by: OCCUPATIONAL THERAPIST

## 2020-05-06 PROCEDURE — 97112 NEUROMUSCULAR REEDUCATION: CPT | Performed by: OCCUPATIONAL THERAPIST

## 2020-05-06 PROCEDURE — 97112 NEUROMUSCULAR REEDUCATION: CPT | Performed by: PHYSICAL THERAPIST

## 2020-05-06 NOTE — PROGRESS NOTES
"PT Re-Assessment / Re-Certification        Patient: Hodan Beverly   : 1950  Diagnosis/ICD-10 Code:  Gait difficulty [R26.9]  Referring practitioner: Stu Marques MD  Date of Initial Visit: Type: THERAPY  Noted: 2020  Today's Date: 2020  Patient seen for 9 sessions      Subjective:   Hodan Beverly reports: \"My jaw dislocated a couple of weeks ago and they had to put my under in order to get it back in place.  I've been wearing my ankle brace (Dynasplint) and I think it's helping stretch my ankle.  I haven't been walking too much.\"  Subjective Questionnaire: n/a  Clinical Progress: worse  Home Program Compliance: Yes  Treatment has included: therapeutic exercise, neuromuscular re-education, therapeutic activity, gait training and electrical stimulation    Subjective   Objective          Passive Range of Motion   Left Ankle/Foot    Dorsiflexion (kf): 5 degrees     Strength/Myotome Testing     Left Ankle/Foot   Dorsiflexion: 2-  Inversion: 2-  Eversion: 2-        PT Neuro         Assessment & Plan     Assessment  Impairments: abnormal coordination, abnormal gait, abnormal muscle firing, abnormal muscle tone, abnormal or restricted ROM, activity intolerance, impaired balance, impaired physical strength, lacks appropriate home exercise program and safety issue  Other impairment: balance  Assessment details: Pt demonstrates functional decline since having to stay home secondary to COVID-19 pandemic.  Pt did continue with wear her L Dynasplint and presents today with increased/improved DF and eversion.  Pt is able to stand with more \"flat\" foot for WB to improve gait and functional mobility.  Pt to benefit from skilled PT services to improve gait, balance, strength, transfers and overall functional mobility.  Prognosis: fair  Functional Limitations: walking and standing  Goals  Plan Goals: STG (4 visits)  1. Patient to improve SILVA balance score to >/= 21 /56 to decrease client's risk of falls.  " ONGOING  2. Patient to perform TUG within 60 sec with appropriate AD without LOB for improved functional mobility.  ONGOING  3. Patient to ambulate 10 meters with AD within 60 sec without LOB for improved gait leslie and functional mobility.  ONGOING  4. Patient to perform sit to stand transfers from W/C to/from mat with QC with more upright posture and SBA for improved functional mobility.  ONGOING    LTG (8 visits)  1. Patient to improve SILVA balance score to >/= 30/56 to decrease client's risk of falls.  ONGOING  2. Patient to perform TUG within 45 sec with AD without LOB for improved functional mobility.  ONGOING  3. Patient to ambulate 10 meters with AD within 45 sec without LOB for improved gait leslie and functional mobility.  ONGOING  4.  Pt to perform supine to/from sit with UE A as needed with mod I for improved functional mobility.  ONGOING  5.  Pt to be mod I with assist from caregiver/ for HEP.  ONGOING  6.  Pt to perform sit to stand with increased L LE WB from various heights 2/2 trials without LOB with mod I for improved functional mobility.  ONGOING    Plan  Therapy options: will be seen for skilled physical therapy services  Planned modality interventions: electrical stimulation/Russian stimulation  Planned therapy interventions: fine motor coordination training, gait training, functional ROM exercises, home exercise program, neuromuscular re-education, strengthening, stretching, therapeutic activities, transfer training, abdominal trunk stabilization, balance/weight-bearing training and postural training  Frequency: 1x week  Duration in visits: 8  Treatment plan discussed with: patient  Plan details: Patient will be seen 1x/wk x 8visits with treatment to include strengthening, stretching, functional e-stim therapy, neuromuscular re-education, balance, gait and endurance training.         Visit Diagnoses:    ICD-10-CM ICD-9-CM   1. Gait difficulty R26.9 781.2   2. Balance problem R26.89  781.99       Progress toward previous goals: Not Met      Recommendations: Continue as planned  Timeframe: 2 months  Prognosis to achieve goals: fair    PT Signature: Maria Alejandra Layton, PT  [place] License #: [id number]    Based upon review of the patient's progress and continued therapy plan, it is my medical opinion that Hodan Beverly should continue physical therapy treatment at [facility]  [address] [address] [phone].    Signature: __________________________________  Stu Marques MD    Timed:  Manual Therapy:    0     mins  96675;  Therapeutic Exercise:    0     mins  47880;     Neuromuscular Rosa Isela:    30    mins  40737;    Therapeutic Activity:     0     mins  87328;     Gait Trainin     mins  22006;     Ultrasound:     0     mins  73484;    Electrical Stimulation:    0     mins  60403 ( );    Untimed:  Electrical Stimulation:    0     mins  06999 ( );  Mechanical Traction:    0     mins  79673;     Timed Treatment:   30   mins   Total Treatment:     30   mins

## 2020-05-06 NOTE — PROGRESS NOTES
"OT Re-Assessment / Re-Certification        Patient: Hodan Beverly   : 1950  Diagnosis/ICD-10 Code:  Impaired mobility and ADLs [Z74.09]  Referring practitioner: Kvng Wesley,*  Date of Initial Visit: Type: THERAPY  Noted: 2020  Today's Date: 2020  Patient seen for 8 sessions      Subjective:   Patient reports: \"I'm so glad to be back\"  Pain: 0/10  Subjective Questionnaire: [facility] OT 14  Clinical Progress: unchanged  Home Program Compliance: Yes  Treatment has included: neuromuscular re-education and therapeutic activity    Subjective   Objective          Active Range of Motion   Left Shoulder   Flexion: 25 degrees   Extension: 10 degrees     Left Elbow   Flexion: 55 degrees   Extension: 20 degrees     Passive Range of Motion     Left Wrist   Wrist flexion: 35 degrees   Wrist extension: 25 degrees     Additional Passive Range of Motion Details  Tone inhibits digit extension and active control during testing and fxnl movements    Ambulation     Ambulation: Level Surfaces     Additional Level Surfaces Ambulation Details  Transfer from w/c to mat table and STS from low mat table: CGA/min A  Standing X5 mins with close SBA (BLE's braced against mat table) while performing UE WB'ing/movements         OT Neuro         Assessment & Plan     Assessment  Impairments: abnormal coordination, abnormal gait, abnormal muscle firing, abnormal muscle tone, abnormal or restricted ROM, activity intolerance, impaired balance, impaired physical strength, lacks appropriate home exercise program and safety issue  Assessment details: OT re-assessment completed per POC. Progression toward goals slow d/t mandatory shut-down from pandemic. Pt demonstrates improved transfer skills from STS and w/c to mat table. Pt met standing goal w/incorporation of WB'ing LUE and use of R hand during dynamic task. Notable increased weakness in LUE d/t time away from therapy.   Pt tolerated good WB'ing through LUE and elbow f/e to " push off from mat table surface for increasing strength and control. Tone continues to impact grasp and release of items but demonstrated improved reaching accuracy. Will continue to progress strength, ROM and fxnl control.   Prognosis: good  Functional Limitations: carrying objects, lifting, walking, pulling, pushing, moving in bed, standing, stooping, reaching behind back, reaching overhead and unable to perform repetitive tasks  Goals  Plan Goals: OT neuro goals    LTGs:       Pt will complete static standing activity X 6 mins with min support while incorporating LUE into WB'ing activity to simulate ADL/IADL tasks by 8 wks; PROGRESSING, COMPLETED DURING 5 MINS       STGs:    Pt will be min A with L UE strengthening HEP to increase performance in ADL/IADL tasks by 4 wks; PARTIALLY MET, CONTINUING TO ADDRESS DIFFERENT TECHNIQUES AND POSITIONS       Pt will complete static standing activity X 3 mins with intermittent min A support while incorporating LUE into WBing tasks to simulate ADL/IADL tasks by 4 wks; MET 5/6/2020    Pt will be min A with sensory re-ed HEP to increase safety and engagement in ADL/IADL tasks by 4 wks; NOT MET     Pt will be MOD assist with UB dressing tasks to promote independence and efficiency by 4 wks; NOT MET      OT UE specific goals    Pt will be independent assist with hand stretching HEP to promote increased function and improved comfort level during ADL/IADL tasks by 8 wks; MET, GETS ASSIST AS NEEDED FROM HIRED HELP/SPOUSE     Pt's caregivers will be independnet assist with LUE stretching HEP to promote increased function and improved comfort level during ADL/IADL tasks by 8 wks; MET; DOES HEPs REGULARLY       Plan  Planned therapy interventions: ADL retraining, balance/weight-bearing training, fine motor coordination training, flexibility, functional ROM exercises, home exercise program, motor coordination training, neuromuscular re-education, postural training, strengthening,  stretching, therapeutic activities and transfer training  Frequency: 1x week  Duration in visits: 8  Plan details: Continue with skilled OT services with adjusted time frame d/t mandatory shut-down from pandemic. Goals adjusted to reflect current status.       OT Exercises     Row Name 05/06/20 1000             Precautions    Existing Precautions/Restrictions  fall fall, L upright brace/dynasplint  -ST         Subjective Pain    Able to rate subjective pain?  yes  -ST      Pre-Treatment Pain Level  0  -ST      Post-Treatment Pain Level  0  -ST         Exercise 1    Exercise Name 1  OT re-assessment completed per POC and pandemic   -ST         Exercise 2    Exercise Name 2  seated EOB w/trunk rotation to L placing and retrieving cones while WB'ing L UE onto elevated surface & use of RUE to grasp/release items  -ST         Exercise 3    Exercise Name 3  seated EOB retrieve/place cones using R UE from floor surface while WB'ing LUE to improve tone and ROM for fxnl movement   -ST         Exercise 4    Exercise Name 4  standing w/close SBA, BLE's braced against mat table while performing FMC task R hand and LUE WB'ing L hand  -ST         Exercise 5    Exercise Name 5  focused control on LUE elbow f/e from mat table during WB'ing in varying plns   -ST        User Key  (r) = Recorded By, (t) = Taken By, (c) = Cosigned By    Initials Name Provider Type    Starr Nails OTR Occupational Therapist          Visit Diagnoses:    ICD-10-CM ICD-9-CM   1. Impaired mobility and ADLs Z74.09 799.89   2. Decreased strength of upper extremity R29.898 729.89   3. Decreased coordination R27.9 781.3   4. Decreased sensation of hand and arm R20.8 782.0       Progress toward previous goals: Partially Met      Recommendations: Continue as planned  Timeframe: 2 months  Prognosis to achieve goals: good    OT Signature: Starr Salinas MS, OTR/L, CDP  [place] License #: [id number]    Based upon review of the patient's progress and continued  therapy plan, it is my medical opinion that Hodan Beverly should continue occupational therapy treatment at [facility]  [address] [facility]  [place] [address] [phone].    Signature: __________________________________  Kvng Wesley MD    Timed:  Manual Therapy:    0     mins  14052;  Therapeutic Exercise:    0     mins  83077;     Neuromuscular Rosa Isela:    14    mins  11908;    Therapeutic Activity:     24     mins  73539;     Self-Care/ADL     0     mins  43427;   Sensory Int. Tech      0     mins 90128;  Ultrasound:     0     mins  58191;    Electrical Stimulation:    0     mins  65778 ( );    Untimed:  Electrical Stimulation:    0     mins  84878 ( );    Timed Treatment:   38   mins   Total Treatment:     38   mins

## 2020-05-12 ENCOUNTER — DOCUMENTATION (OUTPATIENT)
Dept: PHYSICAL THERAPY | Facility: CLINIC | Age: 70
End: 2020-05-12

## 2020-05-12 DIAGNOSIS — Z74.09 IMPAIRED MOBILITY AND ADLS: Primary | ICD-10-CM

## 2020-05-12 DIAGNOSIS — R29.898 DECREASED STRENGTH OF UPPER EXTREMITY: ICD-10-CM

## 2020-05-12 DIAGNOSIS — R20.8 DECREASED SENSATION OF HAND AND ARM: ICD-10-CM

## 2020-05-12 DIAGNOSIS — R27.8 DECREASED COORDINATION: ICD-10-CM

## 2020-05-12 DIAGNOSIS — Z78.9 IMPAIRED MOBILITY AND ADLS: Primary | ICD-10-CM

## 2020-05-12 PROCEDURE — PTNOCHG PR CUSTOM PT NO CHARGE VISIT: Performed by: PHYSICAL THERAPIST

## 2020-05-12 NOTE — PROGRESS NOTES
Occupational Therapy D/C      Patient: Hodan Beverly   : 1950  Diagnosis/ICD-10 Code:  Impaired mobility and ADLs [Z74.09]  Referring practitioner: No ref. provider found  Date of Initial Visit: Type: THERAPY  Noted: 2020  Today's Date: 2020  Patient seen for Visit count could not be calculated. Make sure you are using a visit which is associated with an episode. sessions      Subjective:   Pt d/c'ing from OT services d/t going to another PT clinic for neck pain. When neck pain issued addressed/resolved, she plans to return to neuro OT/PT services.     OT Neuro              Assessment & Plan     Assessment  Impairments: abnormal coordination, abnormal gait, abnormal muscle firing, abnormal muscle tone, abnormal or restricted ROM, activity intolerance, impaired balance, impaired physical strength, lacks appropriate home exercise program and safety issue  Prognosis: good  Functional Limitations: carrying objects, lifting, walking, pulling, pushing, moving in bed, standing, stooping, reaching behind back, reaching overhead and unable to perform repetitive tasks  Goals  Plan Goals: OT neuro goals    LTGs:       Pt will complete static standing activity X 6 mins with min support while incorporating LUE into WB'ing activity to simulate ADL/IADL tasks by 8 wks; PROGRESSING, COMPLETED DURING 5 MINS       STGs:    Pt will be min A with L UE strengthening HEP to increase performance in ADL/IADL tasks by 4 wks; PARTIALLY MET, CONTINUING TO ADDRESS DIFFERENT TECHNIQUES AND POSITIONS       Pt will complete static standing activity X 3 mins with intermittent min A support while incorporating LUE into WBing tasks to simulate ADL/IADL tasks by 4 wks; MET 2020    Pt will be min A with sensory re-ed HEP to increase safety and engagement in ADL/IADL tasks by 4 wks; NOT MET     Pt will be MOD assist with UB dressing tasks to promote independence and efficiency by 4 wks; NOT MET      OT UE specific goals    Pt  will be independent assist with hand stretching HEP to promote increased function and improved comfort level during ADL/IADL tasks by 8 wks; MET, GETS ASSIST AS NEEDED FROM HIRED HELP/SPOUSE     Pt's caregivers will be independnet assist with LUE stretching HEP to promote increased function and improved comfort level during ADL/IADL tasks by 8 wks; MET; DOES HEPs REGULARLY     Goals not all fully addressed or met at this time d/t sudden d/c.     Plan  Planned therapy interventions: ADL retraining, balance/weight-bearing training, fine motor coordination training, flexibility, functional ROM exercises, home exercise program, motor coordination training, neuromuscular re-education, postural training, strengthening, stretching, therapeutic activities and transfer training  Plan details: D/C goals and POC as pt going to another facility for PT for neck pain; pt plans to return when neck pain issues addressed/resolved.         Visit Diagnoses:    ICD-10-CM ICD-9-CM   1. Impaired mobility and ADLs Z74.09 799.89   2. Decreased strength of upper extremity R29.898 729.89   3. Decreased coordination R27.9 781.3   4. Decreased sensation of hand and arm R20.8 782.0             Timed:  Manual Therapy:    0     mins  15986;  Therapeutic Exercise:    0     mins  42580;     Neuromuscular Rosa Isela:    0    mins  65018;    Therapeutic Activity:     0     mins  99585;     Self-Care/ADL     0     mins  34882;   Sensory Int. Tech      0     mins 51760;  Ultrasound:     0     mins  50219;    Electrical Stimulation:    0     mins  90506 ( );    Untimed:  Electrical Stimulation:    0     mins  64512 ( );    Timed Treatment:   0   mins   Total Treatment:     0   mins    OT Signature: Starr Salinas MS, OTR/L, CDP  [place] License #: [id number]

## 2020-05-12 NOTE — PROGRESS NOTES
Discharge Summary  Discharge Summary from Physical Therapy Report      Dates  PT visit: n/a  Number of Visits: 9     Discharge Status of Patient: Dr. Marques ordered PT for neck pain and pt would like to see Tariq Urrutia per MD request.    Goals: Not Met    Discharge Plan: Pt to see Tariq Urrutia for her neck per request    Comments n/a    Date of Discharge 5/12/2020        Maria Alejandra Layton, PT  Physical Therapist

## 2020-06-03 ENCOUNTER — HOSPITAL ENCOUNTER (EMERGENCY)
Facility: HOSPITAL | Age: 70
Discharge: HOME | End: 2020-06-03
Attending: EMERGENCY MEDICINE | Admitting: EMERGENCY MEDICINE

## 2020-06-03 ENCOUNTER — APPOINTMENT (OUTPATIENT)
Dept: GENERAL RADIOLOGY | Facility: HOSPITAL | Age: 70
End: 2020-06-03

## 2020-06-03 VITALS
SYSTOLIC BLOOD PRESSURE: 113 MMHG | HEIGHT: 66 IN | TEMPERATURE: 99 F | RESPIRATION RATE: 17 BRPM | BODY MASS INDEX: 29.73 KG/M2 | OXYGEN SATURATION: 96 % | HEART RATE: 82 BPM | DIASTOLIC BLOOD PRESSURE: 65 MMHG | WEIGHT: 185 LBS

## 2020-06-03 DIAGNOSIS — R50.9 FEBRILE ILLNESS: ICD-10-CM

## 2020-06-03 DIAGNOSIS — N39.0 CHRONIC UTI: Primary | ICD-10-CM

## 2020-06-03 LAB
ALBUMIN SERPL-MCNC: 3.8 G/DL (ref 3.5–5.2)
ALBUMIN/GLOB SERPL: 1.5 G/DL
ALP SERPL-CCNC: 127 U/L (ref 39–117)
ALT SERPL W P-5'-P-CCNC: 27 U/L (ref 1–33)
ANION GAP SERPL CALCULATED.3IONS-SCNC: 10 MMOL/L (ref 5–15)
AST SERPL-CCNC: 28 U/L (ref 1–32)
B PARAPERT DNA SPEC QL NAA+PROBE: NOT DETECTED
B PERT DNA SPEC QL NAA+PROBE: NOT DETECTED
BACTERIA UR QL AUTO: ABNORMAL /HPF
BASOPHILS # BLD AUTO: 0.03 10*3/MM3 (ref 0–0.2)
BASOPHILS NFR BLD AUTO: 0.8 % (ref 0–1.5)
BILIRUB SERPL-MCNC: 0.4 MG/DL (ref 0.2–1.2)
BILIRUB UR QL STRIP: NEGATIVE
BUN BLD-MCNC: 10 MG/DL (ref 8–23)
BUN/CREAT SERPL: 12.8 (ref 7–25)
C PNEUM DNA NPH QL NAA+NON-PROBE: NOT DETECTED
CALCIUM SPEC-SCNC: 9.5 MG/DL (ref 8.6–10.5)
CHLORIDE SERPL-SCNC: 97 MMOL/L (ref 98–107)
CLARITY UR: CLEAR
CO2 SERPL-SCNC: 30 MMOL/L (ref 22–29)
COLOR UR: YELLOW
CREAT BLD-MCNC: 0.78 MG/DL (ref 0.57–1)
D-LACTATE SERPL-SCNC: 0.8 MMOL/L (ref 0.5–2)
DEPRECATED RDW RBC AUTO: 43.1 FL (ref 37–54)
EOSINOPHIL # BLD AUTO: 0.17 10*3/MM3 (ref 0–0.4)
EOSINOPHIL NFR BLD AUTO: 4.8 % (ref 0.3–6.2)
ERYTHROCYTE [DISTWIDTH] IN BLOOD BY AUTOMATED COUNT: 14.5 % (ref 12.3–15.4)
FLUAV H1 2009 PAND RNA NPH QL NAA+PROBE: NOT DETECTED
FLUAV H1 HA GENE NPH QL NAA+PROBE: NOT DETECTED
FLUAV H3 RNA NPH QL NAA+PROBE: NOT DETECTED
FLUAV SUBTYP SPEC NAA+PROBE: NOT DETECTED
FLUBV RNA ISLT QL NAA+PROBE: NOT DETECTED
GFR SERPL CREATININE-BSD FRML MDRD: 74 ML/MIN/1.73
GLOBULIN UR ELPH-MCNC: 2.6 GM/DL
GLUCOSE BLD-MCNC: 97 MG/DL (ref 65–99)
GLUCOSE UR STRIP-MCNC: NEGATIVE MG/DL
HADV DNA SPEC NAA+PROBE: NOT DETECTED
HCOV 229E RNA SPEC QL NAA+PROBE: NOT DETECTED
HCOV HKU1 RNA SPEC QL NAA+PROBE: NOT DETECTED
HCOV NL63 RNA SPEC QL NAA+PROBE: NOT DETECTED
HCOV OC43 RNA SPEC QL NAA+PROBE: NOT DETECTED
HCT VFR BLD AUTO: 36.3 % (ref 34–46.6)
HGB BLD-MCNC: 11.5 G/DL (ref 12–15.9)
HGB UR QL STRIP.AUTO: NEGATIVE
HMPV RNA NPH QL NAA+NON-PROBE: NOT DETECTED
HOLD SPECIMEN: NORMAL
HOLD SPECIMEN: NORMAL
HPIV1 RNA SPEC QL NAA+PROBE: NOT DETECTED
HPIV2 RNA SPEC QL NAA+PROBE: NOT DETECTED
HPIV3 RNA NPH QL NAA+PROBE: NOT DETECTED
HPIV4 P GENE NPH QL NAA+PROBE: NOT DETECTED
HYALINE CASTS UR QL AUTO: ABNORMAL /LPF
IMM GRANULOCYTES # BLD AUTO: 0.02 10*3/MM3 (ref 0–0.05)
IMM GRANULOCYTES NFR BLD AUTO: 0.6 % (ref 0–0.5)
KETONES UR QL STRIP: NEGATIVE
LEUKOCYTE ESTERASE UR QL STRIP.AUTO: ABNORMAL
LYMPHOCYTES # BLD AUTO: 0.86 10*3/MM3 (ref 0.7–3.1)
LYMPHOCYTES NFR BLD AUTO: 24.3 % (ref 19.6–45.3)
M PNEUMO IGG SER IA-ACNC: NOT DETECTED
MCH RBC QN AUTO: 26 PG (ref 26.6–33)
MCHC RBC AUTO-ENTMCNC: 31.7 G/DL (ref 31.5–35.7)
MCV RBC AUTO: 82.1 FL (ref 79–97)
MONOCYTES # BLD AUTO: 0.46 10*3/MM3 (ref 0.1–0.9)
MONOCYTES NFR BLD AUTO: 13 % (ref 5–12)
NEUTROPHILS # BLD AUTO: 2 10*3/MM3 (ref 1.7–7)
NEUTROPHILS NFR BLD AUTO: 56.5 % (ref 42.7–76)
NITRITE UR QL STRIP: NEGATIVE
NRBC BLD AUTO-RTO: 0 /100 WBC (ref 0–0.2)
PH UR STRIP.AUTO: 8 [PH] (ref 5–8)
PLATELET # BLD AUTO: 326 10*3/MM3 (ref 140–450)
PMV BLD AUTO: 8.9 FL (ref 6–12)
POTASSIUM BLD-SCNC: 4.1 MMOL/L (ref 3.5–5.2)
PROCALCITONIN SERPL-MCNC: 0.11 NG/ML (ref 0.1–0.25)
PROT SERPL-MCNC: 6.4 G/DL (ref 6–8.5)
PROT UR QL STRIP: NEGATIVE
RBC # BLD AUTO: 4.42 10*6/MM3 (ref 3.77–5.28)
RBC # UR: ABNORMAL /HPF
REF LAB TEST METHOD: ABNORMAL
RHINOVIRUS RNA SPEC NAA+PROBE: NOT DETECTED
RSV RNA NPH QL NAA+NON-PROBE: NOT DETECTED
SODIUM BLD-SCNC: 137 MMOL/L (ref 136–145)
SP GR UR STRIP: 1.02 (ref 1–1.03)
SQUAMOUS #/AREA URNS HPF: ABNORMAL /HPF
UROBILINOGEN UR QL STRIP: ABNORMAL
WBC NRBC COR # BLD: 3.54 10*3/MM3 (ref 3.4–10.8)
WBC UR QL AUTO: ABNORMAL /HPF
WHOLE BLOOD HOLD SPECIMEN: NORMAL
WHOLE BLOOD HOLD SPECIMEN: NORMAL

## 2020-06-03 PROCEDURE — 99284 EMERGENCY DEPT VISIT MOD MDM: CPT

## 2020-06-03 PROCEDURE — 83605 ASSAY OF LACTIC ACID: CPT | Performed by: EMERGENCY MEDICINE

## 2020-06-03 PROCEDURE — 81001 URINALYSIS AUTO W/SCOPE: CPT | Performed by: PHYSICIAN ASSISTANT

## 2020-06-03 PROCEDURE — 80053 COMPREHEN METABOLIC PANEL: CPT | Performed by: EMERGENCY MEDICINE

## 2020-06-03 PROCEDURE — 71045 X-RAY EXAM CHEST 1 VIEW: CPT

## 2020-06-03 PROCEDURE — P9612 CATHETERIZE FOR URINE SPEC: HCPCS

## 2020-06-03 PROCEDURE — 87086 URINE CULTURE/COLONY COUNT: CPT | Performed by: PHYSICIAN ASSISTANT

## 2020-06-03 PROCEDURE — 87040 BLOOD CULTURE FOR BACTERIA: CPT | Performed by: PHYSICIAN ASSISTANT

## 2020-06-03 PROCEDURE — 0100U HC BIOFIRE FILMARRAY RESP PANEL 2: CPT | Performed by: PHYSICIAN ASSISTANT

## 2020-06-03 PROCEDURE — 85025 COMPLETE CBC W/AUTO DIFF WBC: CPT | Performed by: EMERGENCY MEDICINE

## 2020-06-03 PROCEDURE — U0003 INFECTIOUS AGENT DETECTION BY NUCLEIC ACID (DNA OR RNA); SEVERE ACUTE RESPIRATORY SYNDROME CORONAVIRUS 2 (SARS-COV-2) (CORONAVIRUS DISEASE [COVID-19]), AMPLIFIED PROBE TECHNIQUE, MAKING USE OF HIGH THROUGHPUT TECHNOLOGIES AS DESCRIBED BY CMS-2020-01-R: HCPCS | Performed by: PHYSICIAN ASSISTANT

## 2020-06-03 PROCEDURE — 84145 PROCALCITONIN (PCT): CPT | Performed by: PHYSICIAN ASSISTANT

## 2020-06-03 RX ORDER — SODIUM CHLORIDE 0.9 % (FLUSH) 0.9 %
10 SYRINGE (ML) INJECTION AS NEEDED
Status: DISCONTINUED | OUTPATIENT
Start: 2020-06-03 | End: 2020-06-03 | Stop reason: HOSPADM

## 2020-06-03 RX ORDER — LEVOFLOXACIN 750 MG/1
750 TABLET ORAL DAILY
COMMUNITY
End: 2020-07-23

## 2020-06-03 NOTE — ED PROVIDER NOTES
EMERGENCY DEPARTMENT ENCOUNTER    Room Number:  24/24  Date of encounter:  6/4/2020  PCP: Stu Marques MD  Historian: Patient      HPI:  Chief Complaint: Fevers on and off for 3 weeks    A complete HPI/ROS/PMH/PSH/SH/FH are unobtainable due to: Not applicable    Context: Hodan Beverly is a 69 y.o. female who presents to the ED c/o currently being treated for UTI for 2 weeks with Levaquin, still running fevers daily.  Temperature up to 101.5 F yesterday.  She has taken Tylenol today.  States she has had no cough no shortness of breath no wheezing.  She has had recurrent UTIs and states that Dr. Marques put her on Levaquin for this UTI.  She has no more UTI symptoms but still running fevers as mentioned.  She had a teleconference with the office today they would not see her because she had a temperature of 99 so they sent her to the emergency department for evaluation.  She has no other specific complaints, states she does not feel like she is ill enough to be in an emergency department but did no vertigo for her follow-up.  She has no port or PICC line.  No new prosthesis.  No abdominal pain, diarrhea, runny nose or upper respiratory symptoms.  She has no known exposure to COVID.  Also denies any rash or lesions.      PAST MEDICAL HISTORY  Active Ambulatory Problems     Diagnosis Date Noted   • Vaginal atrophy    • Stroke (CMS/HCC)    • Seizures (CMS/HCC)    • Menopause    • Hypertension    • Hyperlipidemia    • Depression    • Chronic constipation    • Asthma    • Senile osteoporosis 01/25/2019     Resolved Ambulatory Problems     Diagnosis Date Noted   • No Resolved Ambulatory Problems     No Additional Past Medical History         PAST SURGICAL HISTORY  Past Surgical History:   Procedure Laterality Date   • BREAST EXCISIONAL BIOPSY Left 2011   • CRANIOPLASTY     • CRANIOTOMY     • HYSTERECTOMY  1988    oophorectomy, unilateral   • LUNG SURGERY Left     removal of nodule   • OOPHORECTOMY      unilateral   •  UTERINE FIBROID SURGERY           FAMILY HISTORY  Family History   Problem Relation Age of Onset   • Ovarian cancer Mother 79   • Breast cancer Sister 67         SOCIAL HISTORY  Social History     Socioeconomic History   • Marital status:      Spouse name: Not on file   • Number of children: Not on file   • Years of education: Not on file   • Highest education level: Not on file   Tobacco Use   • Smoking status: Never Smoker   • Smokeless tobacco: Never Used   Substance and Sexual Activity   • Alcohol use: No   • Drug use: No   • Sexual activity: Defer         ALLERGIES  Patient has no known allergies.        REVIEW OF SYSTEMS  Review of Systems     All systems reviewed and negative except for those discussed in HPI.       PHYSICAL EXAM    I have reviewed the triage vital signs and nursing notes.    ED Triage Vitals   Temp Heart Rate Resp BP SpO2   06/03/20 1349 06/03/20 1349 06/03/20 1349 06/03/20 1353 06/03/20 1349   99 °F (37.2 °C) 91 17 122/64 96 %      Temp src Heart Rate Source Patient Position BP Location FiO2 (%)   06/03/20 1349 -- -- -- --   Oral           Physical Exam  GENERAL: Warm pink dry afebrile nontoxic well developed.  Appears in no acute distress.  HENT: Nares patent TMs clear oral mucous membranes are moist and pink  EYES: No scleral icterus  CV: Regular rhythm, regular rate  RESPIRATORY: Normal effort.  No audible wheezes, rales or rhonchi  ABDOMEN: Soft, nontender no guarding rebound or rigidity  MUSCULOSKELETAL: No deformities.   NEURO: Alert, moves all extremities, follows commands  SKIN: Warm, dry, no rash visualized no rash no lesion        LAB RESULTS  No results found for this or any previous visit (from the past 24 hour(s)).    Ordered the above labs and independently reviewed the results.        RADIOLOGY  No Radiology Exams Resulted Within Past 24 Hours    I ordered and reviewed the above noted radiographic studies.    I viewed images of chest x-ray which showed no acute  findings per my independent interpretation.  See radiologist's dictation for official interpretation.        PROCEDURES    Procedures      MEDICATIONS GIVEN IN ER    Medications - No data to display      PROGRESS, DATA ANALYSIS, CONSULTS, AND MEDICAL DECISION MAKING    All labs have been independently reviewed by me.  All radiology studies have been reviewed by me and the radiologist dictating the report.   EKG's have been independently viewed and interpreted by me.      Differential diagnoses: Sepsis, UTI, pneumonia           Discussed the findings with the patient.  We can have a urine culture back in about 2 days as well as a blood cultures.  She does not want to switch antibiotics at this point.  She states she really feels pretty good but had a follow-up and still running fevers as mentioned above.      AS OF 20:38 VITALS:    BP - 113/65  HR - 82  TEMP - 99 °F (37.2 °C) (Oral)  O2 SATS - 96%        DIAGNOSIS  Final diagnoses:   Chronic UTI   Febrile illness         DISPOSITION  DISCHARGE    Patient discharged in stable condition.    Reviewed implications of results, diagnosis, meds, responsibility to follow up, warning signs and symptoms of possible worsening, potential complications and reasons to return to ER.    Patient/Family voiced understanding of above instructions.    Discussed plan for discharge, as there is no emergent indication for admission.  Pt/family is agreeable and understands need for follow up and possible repeat testing.  Pt/family is aware that discharge does not mean that nothing is wrong but that it indicates no emergency is currently present that requires admission and they must continue care with follow-up as given below or with a physician of their choice.     FOLLOW-UP  Stu Marques MD  [facility] [id number] [phone]               Medication List      No changes were made to your prescriptions during this visit.                  Dontrell Santiago  LEILA Watkins  06/04/20 7099

## 2020-06-04 ENCOUNTER — TELEPHONE (OUTPATIENT)
Dept: EMERGENCY DEPT | Facility: HOSPITAL | Age: 70
End: 2020-06-04

## 2020-06-04 LAB
BACTERIA SPEC AEROBE CULT: NO GROWTH
REF LAB TEST METHOD: NORMAL
SARS-COV-2 RNA RESP QL NAA+PROBE: NOT DETECTED

## 2020-06-08 LAB
BACTERIA SPEC AEROBE CULT: NORMAL
BACTERIA SPEC AEROBE CULT: NORMAL

## 2020-06-10 ENCOUNTER — TRANSCRIBE ORDERS (OUTPATIENT)
Dept: ADMINISTRATIVE | Facility: HOSPITAL | Age: 70
End: 2020-06-10

## 2020-06-10 DIAGNOSIS — R10.2 PELVIC AND PERINEAL PAIN: ICD-10-CM

## 2020-06-10 DIAGNOSIS — R10.9 ABDOMINAL PAIN, UNSPECIFIED ABDOMINAL LOCATION: Primary | ICD-10-CM

## 2020-07-09 ENCOUNTER — HOSPITAL ENCOUNTER (OUTPATIENT)
Dept: GENERAL RADIOLOGY | Facility: HOSPITAL | Age: 70
Discharge: HOME | End: 2020-07-09
Admitting: INTERNAL MEDICINE

## 2020-07-09 ENCOUNTER — TRANSCRIBE ORDERS (OUTPATIENT)
Dept: GENERAL RADIOLOGY | Facility: HOSPITAL | Age: 70
End: 2020-07-09

## 2020-07-09 ENCOUNTER — TRANSCRIBE ORDERS (OUTPATIENT)
Dept: PHYSICAL THERAPY | Facility: CLINIC | Age: 70
End: 2020-07-09

## 2020-07-09 DIAGNOSIS — R61 NIGHT SWEATS: Primary | ICD-10-CM

## 2020-07-09 DIAGNOSIS — R61 NIGHT SWEATS: ICD-10-CM

## 2020-07-09 DIAGNOSIS — I63.411 CEREBROVASCULAR ACCIDENT (CVA) DUE TO EMBOLISM OF RIGHT MIDDLE CEREBRAL ARTERY (HCC): ICD-10-CM

## 2020-07-09 DIAGNOSIS — M62.81 MUSCLE WEAKNESS: Primary | ICD-10-CM

## 2020-07-09 PROCEDURE — 71046 X-RAY EXAM CHEST 2 VIEWS: CPT

## 2020-07-16 ENCOUNTER — HOSPITAL ENCOUNTER (OUTPATIENT)
Dept: CT IMAGING | Facility: HOSPITAL | Age: 70
Discharge: HOME | End: 2020-07-16
Admitting: INTERNAL MEDICINE

## 2020-07-16 DIAGNOSIS — R10.2 PELVIC AND PERINEAL PAIN: ICD-10-CM

## 2020-07-16 DIAGNOSIS — R10.9 ABDOMINAL PAIN, UNSPECIFIED ABDOMINAL LOCATION: ICD-10-CM

## 2020-07-16 PROCEDURE — 74176 CT ABD & PELVIS W/O CONTRAST: CPT

## 2020-07-23 ENCOUNTER — OFFICE VISIT (OUTPATIENT)
Dept: GYNECOLOGIC ONCOLOGY | Facility: CLINIC | Age: 70
End: 2020-07-23

## 2020-07-23 VITALS
DIASTOLIC BLOOD PRESSURE: 65 MMHG | HEART RATE: 83 BPM | SYSTOLIC BLOOD PRESSURE: 128 MMHG | WEIGHT: 185 LBS | OXYGEN SATURATION: 94 % | RESPIRATION RATE: 16 BRPM | BODY MASS INDEX: 29.73 KG/M2 | HEIGHT: 66 IN | TEMPERATURE: 97.1 F

## 2020-07-23 DIAGNOSIS — R19.00 PELVIC MASS IN FEMALE: Primary | ICD-10-CM

## 2020-07-23 DIAGNOSIS — Z90.710 HISTORY OF HYSTERECTOMY: ICD-10-CM

## 2020-07-23 DIAGNOSIS — D39.0 SMOOTH MUSCLE TUMOR OF UTERUS: ICD-10-CM

## 2020-07-23 DIAGNOSIS — D39.0 UTERINE NEOPLASM OF UNCERTAIN MALIGNANT POTENTIAL: ICD-10-CM

## 2020-07-23 PROCEDURE — 99205 OFFICE O/P NEW HI 60 MIN: CPT | Performed by: OBSTETRICS & GYNECOLOGY

## 2020-07-26 PROBLEM — D39.0 UTERINE NEOPLASM OF UNCERTAIN MALIGNANT POTENTIAL: Status: ACTIVE | Noted: 2020-07-26

## 2020-07-26 PROBLEM — Z90.710 HISTORY OF HYSTERECTOMY: Status: ACTIVE | Noted: 2020-07-26

## 2020-07-26 PROBLEM — R19.00 PELVIC MASS IN FEMALE: Status: ACTIVE | Noted: 2020-07-26

## 2020-07-26 PROBLEM — D39.0 SMOOTH MUSCLE TUMOR OF UTERUS: Status: ACTIVE | Noted: 2020-07-26

## 2020-07-26 NOTE — PROGRESS NOTES
Hodan Beverly  [id number]  1950      Reason for visit: Pelvic mass    Consultation:  Patient is being seen at the request of Stu Marques    History of present illness:  The patient is a 69 y.o. year old female who presents today for treatment and evaluation of the above issues.    Patient reports 3-month history of symptoms including urinary frequency.  She was recently seen by Dr. Rossi with urology and underwent catheterized urinalysis and culture which was positive for urinary tract infection and she was treated with antibiotics.  However, she continues to have urinary frequency.  She notes no pelvic pain but may be having symptoms of pressure and discomfort.  She is accompanied by her .    Her past medical history is significant for myomectomy, subsequent hysterectomy with unilateral salpingo-oophorectomy and a separate unilateral salpingo-oophorectomy.  Patient reports that she was diagnosed with STUMP and was advised at the removal of her remaining tube and ovary that recurrent STUMP was noted.  At that time, her surgeon decided to perform no additional procedures.  That was in the 1990s.  Her past medical history is also significant for scheduled thoracotomy with a goal of removal of a lung nodule which resulted in a CVA as a postoperative complication approximately 2 hours after her surgery.  This is left her with significant residual physical issues and she is cared for by her .      For new patients, PFSH intake form from the was reviewed and confirmed.    OBGYN History:  She is a P3.  She uses vaginal estrogen but no systemic hormone replacement. She does not have a history of abnormal pap smears.      Oncologic History:   No history exists.         Past Medical History:   Diagnosis Date   • Asthma    • Atrial fibrillation (CMS/HCC)    • Chronic constipation    • Depression    • Hyperlipidemia    • Hypertension    • Menopause    • Seizures (CMS/HCC)    • Stroke (CMS/HCC)    •  Vaginal atrophy        Past Surgical History:   Procedure Laterality Date   • BREAST EXCISIONAL BIOPSY Left 2011   • CRANIOPLASTY     • CRANIOTOMY     • HYSTERECTOMY  1988    oophorectomy, unilateral   • LUNG SURGERY Left     removal of nodule   • OOPHORECTOMY      unilateral   • UTERINE FIBROID SURGERY         MEDICATIONS: The current medication list was reviewed with the patient and updated in the EMR this date per the Medical Assistant. Medication dosages and frequencies were confirmed to be accurate.      Allergies:  has No Known Allergies.    Social History:   Social History     Socioeconomic History   • Marital status:      Spouse name: Not on file   • Number of children: Not on file   • Years of education: Not on file   • Highest education level: Not on file   Tobacco Use   • Smoking status: Never Smoker   • Smokeless tobacco: Never Used   Substance and Sexual Activity   • Alcohol use: No   • Drug use: No   • Sexual activity: Defer       Family History:    Family History   Problem Relation Age of Onset   • Ovarian cancer Mother 79   • Breast cancer Sister 67   • Hypertension Father        Health Maintenance:    Health Maintenance   Topic Date Due   • TDAP/TD VACCINES (1 - Tdap) 10/23/1963   • ZOSTER VACCINE (1 of 2) 10/23/2002   • Pneumococcal Vaccine Once at 65 Years Old  10/23/2017   • HEPATITIS C SCREENING  01/17/2019   • MEDICARE ANNUAL WELLNESS  01/17/2019   • LIPID PANEL  01/17/2019   • COLONOSCOPY  01/17/2019   • DXA SCAN  01/29/2019   • INFLUENZA VACCINE  08/01/2020   • MAMMOGRAM  12/06/2020     Review of Systems   Constitutional: Negative for appetite change, chills, fatigue, fever and unexpected weight change.   HENT: Negative for congestion, hearing loss and sore throat.    Eyes: Negative for redness and visual disturbance.   Respiratory: Negative for cough, shortness of breath and wheezing.    Cardiovascular: Negative for chest pain, palpitations and leg swelling.   Gastrointestinal:  "Negative for abdominal distention, abdominal pain, blood in stool, constipation, diarrhea, nausea and vomiting.   Endocrine: Negative.  Negative for cold intolerance and heat intolerance.   Genitourinary: Positive for frequency. Negative for difficulty urinating, dyspareunia, dysuria, genital sores, hematuria, pelvic pain (pressure), urgency, vaginal bleeding, vaginal discharge and vaginal pain.   Musculoskeletal: Positive for gait problem. Negative for arthralgias, back pain and joint swelling.   Skin: Negative for rash and wound.   Allergic/Immunologic: Negative for food allergies and immunocompromised state.   Neurological: Negative for dizziness, seizures, syncope, weakness, light-headedness, numbness and headaches.   Hematological: Negative for adenopathy. Does not bruise/bleed easily.   Psychiatric/Behavioral: Negative.  Negative for confusion, dysphoric mood and sleep disturbance. The patient is not nervous/anxious.        Physical Exam    Vitals:    07/23/20 1421   BP: 128/65   Pulse: 83   Resp: 16   Temp: 97.1 °F (36.2 °C)   TempSrc: Temporal   SpO2: 94%   Weight: 83.9 kg (185 lb)   Height: 167.6 cm (66\")       Body mass index is 29.86 kg/m².    Wt Readings from Last 3 Encounters:   07/23/20 83.9 kg (185 lb)   06/03/20 83.9 kg (185 lb)   04/02/19 86.6 kg (191 lb)         GENERAL: Alert, well-appearing female appearing her stated age who is in no apparent distress.   HEENT: Sclera anicteric. Head normocephalic, atraumatic. Mucus membranes moist.   NECK: Trachea midline, supple, without masses.  No thyromegaly.   BREASTS: Deferred  CARDIOVASCULAR: Normal rate, regular rhythm, no murmurs, rubs, or gallops.  No peripheral edema.  Implanted device (Loop recorder implant per history) left thorax  RESPIRATORY: Clear to auscultation bilaterally, normal respiratory effort  BACK:  No CVA tenderness, no vertebral tenderness on palpation  GASTROINTESTINAL:  Abdomen is soft, non-tender, non-distended, no rebound or " guarding, no masses, or hernias. No HSM.   SKIN:  Warm, dry, well-perfused.  All visible areas intact.  No rashes, lesions, ulcers.  PSYCHIATRIC: AO x3, with appropriate affect, normal thought processes.  NEUROLOGIC: Motor function impaired, wheelchair for ambulation  MUSCULOSKELETAL: Motor function impaired, wheelchair for ambulation   EXTREMITIES:   No cyanosis, clubbing, symmetric.  LYMPHATICS:  No cervical or inguinal adenopathy noted.     PELVIC exam:    External genitalia are free from lesion.  On bimanual examination, mass involving the upper one half of right side of vagina was noted.  Uterus, cervix and adnexa were absent.  There was no significant tenderness.  Rectovaginal exam was deferred.    ECOG PS 2    PROCEDURES:  none    Diagnostic Data:    I personally reviewed the CT images with the patient and her .  I showed them the pelvic mass and how it is at the right side the pelvis, deep in the pelvis, distorts the bladder.  I noted my concerns regarding possible bladder involvement of the mass as the plane between the 2 structures becomes indistinct and the more cephalad views.  In addition, there seems to be a plane between the mass in the rectum for the most part although this focally becomes obscured as well.    Ct Abdomen Pelvis Without Contrast    Result Date: 7/18/2020  Soft tissue mass seen within the pelvis possibly arising from the right adnexa suggesting possibly an ovarian malignancy. Clinical correlation is needed. No other signs of malignancy or metastatic disease. No evidence of adenopathy.  D:  07/17/2020 E:  07/17/2020  This report was finalized on 7/18/2020 11:04 AM by Dr. Hodan Saini MD.      Xr Chest 1 View    Result Date: 6/3/2020  No acute cardiopulmonary disease.  D:  06/03/2020 E:  06/03/2020  This report was finalized on 6/3/2020 5:51 PM by Dr. Hodan Saini MD.      Xr Chest Pa & Lateral    Result Date: 7/9/2020  Chronic changes seen within the lung fields with  borderline enlargement of the heart.  No evidence of acute parenchymal disease. The findings are stable.  D:  07/09/2020 E:  07/09/2020  This report was finalized on 7/9/2020 4:58 PM by Dr. Hodan Saini MD.        Lab Results   Component Value Date    WBC 3.54 06/03/2020    HGB 11.5 (L) 06/03/2020    HCT 36.3 06/03/2020    MCV 82.1 06/03/2020     06/03/2020    NEUTROABS 2.00 06/03/2020    GLUCOSE 97 06/03/2020    BUN 10 06/03/2020    CREATININE 0.78 06/03/2020    EGFRIFNONA 74 06/03/2020     06/03/2020    K 4.1 06/03/2020    CL 97 (L) 06/03/2020    CO2 30.0 (H) 06/03/2020    MG 1.8 06/06/2018    CALCIUM 9.5 06/03/2020    ALBUMIN 3.80 06/03/2020    AST 28 06/03/2020    ALT 27 06/03/2020    BILITOT 0.4 06/03/2020     No results found for:         Assessment/Plan   This is a 69 y.o. woman with history of STUMP, status post myomectomy with subsequent hysterectomy and bilateral salpingo-oophorectomy.  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  Encounter Diagnoses   Name Primary?   • Pelvic mass in female Yes   • Uterine neoplasm of uncertain malignant potential    • Smooth muscle tumor of uterus    • History of hysterectomy      I had a lengthy discussion with the patient and her  regarding options.  One option would be to try to obtain a biopsy and see if the diagnosis can be obtained.  Another option would be for the patient to undergo surgery with a goal of palliation of symptoms and diagnosis confirmation.  They understand that this is likely a recurrent STUMP, but that these tumors can undergo malignant transformation.  We discussed the limitations of biopsy and that a biopsy may give us more information prior to the surgery, but would not palliate her symptoms.  We discussed the complexities of surgery related to the mass as seen on CT imaging which was reviewed with the patient and her  and in relation to her pelvic examination as detailed above.      If this was a cancerous  process and adequate margins were needed, this would likely lead to a near complete bladder resection if not a complete bladder resection and possibly a rectal resection.  If bladder resection was not successful, patient would need to undergo urinary conduit and if rectal resection with reanastomosis was not successful, patient would need to undergo colostomy.  This would essentially be a pelvic exenteration.  The patient was not enamored with discussions regarding urinary conduit or colostomy.  I recommended they consider resection for palliation of symptoms and diagnosis with a goal of keeping the bladder and colon intact with an understanding of if this is a malignancy that would be an inferior procedure from a cancer standpoint.  Though I could perform a frozen section at the time of surgery, it can be challenging for pathologist to say with 100% certainty if the smooth muscle tumor is malignant or not on frozen section.  Patient and  are going to consider their options.  With her permission, I called her brother who is a retired emergency room physician in [place] and discussed her case.  I also discussed her case with Dr. Marques regarding at the above options and for clarification of perioperative anticoagulation management.  It sounds like patient likely has paroxysmal atrial fibrillation.  She could hold her Eliquis 2 days prior to the procedure and really initiate her Eliquis when there is less risk of postoperative hemorrhage.  I advised Dr. Marques that I usually start people back on their anticoagulation day 2 or 3 postoperatively depending on their postoperative course.  He thought this was reasonable.    Of note, we did discuss the possibility of subsequent CVA given patient's initial CVA was a postoperative complication.  I think her complication could have been related to the fact that she had a thoracic procedure and apparently undiagnosed PAF.    Pain assessment was performed today as a part  of patient’s care.  For patients with pain related to surgery, gynecologic malignancy or cancer treatment, the plan is as noted in the assessment/plan.  For patients with pain not related to these issues, they are to seek any further needed care from a more appropriate provider, such as PCP.      Orders Placed This Encounter   Procedures   • SCANNED - IMAGING       FOLLOW UP: Patient is to call if she desires biopsy or surgical intervention.    Electronically Signed by: Aleida Salmeron MD  Date: 7/26/2020

## 2020-07-27 ENCOUNTER — TELEPHONE (OUTPATIENT)
Dept: ONCOLOGY | Facility: CLINIC | Age: 70
End: 2020-07-27

## 2020-07-27 ENCOUNTER — TELEPHONE (OUTPATIENT)
Dept: OBSTETRICS AND GYNECOLOGY | Facility: CLINIC | Age: 70
End: 2020-07-27

## 2020-07-27 NOTE — TELEPHONE ENCOUNTER
Her gynecologist, Dr. Hawthorne, called her regarding surgery.  He talked about her medical history with her and he wanted her to relay this information to Dr. Salmeron.    [phone]

## 2020-07-27 NOTE — TELEPHONE ENCOUNTER
I have called the patient to ascertain her status.  She is in [place] with her .  She has seen Dr. Salmeron for evaluation of a pelvic mass diagnosed on CT scan.  This patient had a uterine myomectomy at a young age.  She subsequently underwent an abdominal hysterectomy with a unilateral salpingo-oophorectomy in 1988 at that the [facility].  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  She subsequently had a unilateral salpingo-oophorectomy which she thinks was done at the [facility] as well.  I had seen her in gynecologic follow-up initially and she had done well.  She was seen in January 2019 with complaints of vaginal atrophy and dyspareunia and was treated with Premarin vaginal cream and a low dose of 0.5 g twice a week.  I have emphasized to the patient that she needs to follow Dr. Salmeron's recommendations implicitly.

## 2020-07-27 NOTE — TELEPHONE ENCOUNTER
Called patient to gather more information. She says Dr. Hawthorne called her and she wanted to ensure that Dr. Ying had all of her historical pathology information. This is outlined in telephone note by GYN from today. I will pass on to MD.

## 2020-08-05 ENCOUNTER — TELEPHONE (OUTPATIENT)
Dept: GYNECOLOGIC ONCOLOGY | Facility: CLINIC | Age: 70
End: 2020-08-05

## 2020-08-05 NOTE — TELEPHONE ENCOUNTER
Pt called to let Dr Salmeron know she has made a decision about having surgery. Please call pt @[phone]

## 2020-08-06 ENCOUNTER — TELEPHONE (OUTPATIENT)
Dept: GYNECOLOGIC ONCOLOGY | Facility: CLINIC | Age: 70
End: 2020-08-06

## 2020-08-06 NOTE — TELEPHONE ENCOUNTER
I called patient.    She states that she wanted to talk to Dr. Salmeron about different options.    She is worried about open surgeries and the recovery since having her stroke. She has just been able to learn how to walk and stand since the 5 years.     She would like to talk about lasers, laparoscopic, or any other options.          I told her she would need to talk with Dr. Salmeron.

## 2020-08-06 NOTE — TELEPHONE ENCOUNTER
Patient needs to schedule appt for surgery but has some questions before the surgery is scheduled      Call patient back at [phone]

## 2020-08-06 NOTE — TELEPHONE ENCOUNTER
Patient needs an appointment to talk about surgery with dr. Salmeron.       There is no surgical plan for me put a case in and schedule.    Thank you!!!

## 2020-08-06 NOTE — TELEPHONE ENCOUNTER
Per Dr. Salmeron, no appointment will be needed. Routing back to [facility] for final surgical plan to be added.

## 2020-08-07 ENCOUNTER — TELEPHONE (OUTPATIENT)
Dept: GYNECOLOGIC ONCOLOGY | Facility: CLINIC | Age: 70
End: 2020-08-07

## 2020-08-07 DIAGNOSIS — R19.00 PELVIC MASS IN FEMALE: Primary | ICD-10-CM

## 2020-08-07 RX ORDER — SODIUM, POTASSIUM,MAG SULFATES 17.5-3.13G
SOLUTION, RECONSTITUTED, ORAL ORAL
Qty: 1 BOTTLE | Refills: 0 | Status: SHIPPED | OUTPATIENT
Start: 2020-08-07 | End: 2020-08-28 | Stop reason: HOSPADM

## 2020-08-07 RX ORDER — NEOMYCIN SULFATE 500 MG/1
TABLET ORAL
Qty: 6 TABLET | Refills: 0 | Status: SHIPPED | OUTPATIENT
Start: 2020-08-07 | End: 2020-08-28 | Stop reason: HOSPADM

## 2020-08-07 RX ORDER — ACETAMINOPHEN 325 MG/1
650 TABLET ORAL
Status: CANCELLED | OUTPATIENT
Start: 2020-08-07

## 2020-08-07 RX ORDER — ONDANSETRON 4 MG/1
TABLET, FILM COATED ORAL
Qty: 10 TABLET | Refills: 0 | Status: ON HOLD | OUTPATIENT
Start: 2020-08-07 | End: 2020-08-11

## 2020-08-07 RX ORDER — METRONIDAZOLE 500 MG/1
TABLET ORAL
Qty: 3 TABLET | Refills: 0 | Status: SHIPPED | OUTPATIENT
Start: 2020-08-07 | End: 2020-08-28 | Stop reason: HOSPADM

## 2020-08-07 NOTE — TELEPHONE ENCOUNTER
PT called to speak with Annette about scheduling her surgery and ask general questions. Please call pt back @[phone]

## 2020-08-07 NOTE — TELEPHONE ENCOUNTER
Arlette from Dr. Morales Marques's office (PCP) calling.  Patient called there saying that she could not get surgery scheduled, that she wasn't getting anywhere with the office.  She called them to get them to help facilitate a surgery date.  I told Arlette that the office has called the patient and it seems from the message that the patient is undecided about the surgery and has questions.    Please call patient asap  [phone]

## 2020-08-07 NOTE — TELEPHONE ENCOUNTER
I called patient and let her know I have spoken with Dr. Salmeron.    She will have surgery and I explained the procedures and answered most of her questions.    She will be notified of covid date and time.

## 2020-08-07 NOTE — TELEPHONE ENCOUNTER
Inpatient Surgery Instruction with bowel prep        Hodan Beverly  1950  [id number]        SURGEON:  Aleida Salmeron MD    Surgery Coordinator: Annette   If you have any questions before or after surgery please call.  [phone]         Appointment     You have Covid testing on 08/09/2020 at 1:15 pm.  The location is [address] [place] [address], this is a drive thru tent on [facility].     1.  Your surgery has been scheduled on 08/1/2020 You will need to be at the [address] [place] [address] second floor surgery registration on that day at 10:30 am.     The Day(s) Before Surgery     ·  You will be following a clear liquid diet with no solid food the day before surgery.    · Plan to have someone take you home from the hospital.    · Do not use any products that contain nicotine or tobacco, such as cigarettes and e-cigarettes. These can delay healing after surgery. If you need help quitting, ask your health care provider.    ·  Do not take vitamins or aspirin one week before surgery ( if applicable).  On the morning of your surgery, you may take you prescription medications with a sip of water, unless told otherwise by your provider.  Bring them with you to the hospital (Diabetic patient should bring insulin if instructed by the managing physician). If you are taking a blood thinner ( Eliquis, Coumadin, Xarelto, Lovenox, Asprin, Heparin, etc.) please have the provider that manages this instruct you on when to stop taking prior to surgery.   · You will hold Eliquis two days before surgery ( per Dr. Salmeron)     · If you are feeling sick, have a fever or cough and have seen your PCP let our office know 48 hours prior to surgery. It may be subject to rescheduling.       Eating and drinking restrictions              Follow instructions from your health care provider about eating and drinking, which may include:    You will be following a clear liquid diet the day before surgery, no solid  foods, you can have things like broth, jello or Popsicles.     NO MILK, CREAM, OR ORANGE JUICE.          Bowel Prep Instructions                 What supplies do I need to prepare in advance?   Obtain the following supplies at your local pharmacy:   · Ondansetron 4 mg tablet- take one tablet an hour prior to bowel prep.  •    Your Bowel Prep medication  •   Two bottles of Gatorade (32 ounces each):  ( Gatorade Zero for diabetics)        To begin the day before surgery:  Start 1st dose at 9:00 am and finish within an hour.  Start 2nd dose at 10:30 am and finish within an hour. Set a timer for every 15 minutes to keep pace.     • Stay near a toilet, as you will have diarrhea.        Here is a written list of the antibiotics you will be required to take in addition the the bowel prep the day before surgery.      · METRONIDAZOLE 500 MG    To start the day before surgery:   Take 1 tablet by mouth  at 2 pm, 1 tablet  at 3 pm, and 1 tablet at 10 pm.      · NEOMYCIN 500 MG    To begin the day before surgery:  Take (2) 500 mg  tabs at 2:00pm, take (2) 500 mg tabs at 3:00pm, and take (2) 500 mg tabs at 10:00pm.         You will need to drink 2 bottles of Gatorade 4 hours prior to surgery arrival time. These need to be finished 2 hours before you get to the hospital. If you are drinking Gatorade on the way or in the lobby your surgery may be subject to reschedule or cause delays.  The Gatorade is to replenish electrolytes lost from having diarrhea.         Colon cleansing tips:   1. Stay near a toilet! You will have diarrhea, which can be quite sudden. This is normal.   2. Try drinking the solution with a straw. It may be easier to tolerate.   3. Rarely, people may have nausea or vomiting with the prep. If this occurs, give yourself a 30 -minute break, rinse your mouth or brush your teeth, then continue drinking the prep solution.   4. You may have bowel cramps until the stool has flushed from your colon (this may take 2 to 4  hours and sometimes much longer).      Anal skin irritation or a flare of hemorrhoid inflammation may occur. If this happens, treat it with over-the-counter-remedies, such as hydrocortisone cream, baby wipes, Vaseline or TUCKS ( witch hazel)             What happens after the procedure?  · You will be given pain medicine as needed.  · Your blood pressure, heart rate, breathing rate, and blood oxygen level will be monitored until the medicines you were given have worn off.  · You will need to stay in the hospital to recover for one to two days.  · You may have a liquid diet at first. You will most likely return to your usual diet the day after surgery.  · You will still have the urinary catheter in place. It will likely be removed the day after surgery.  · You may have to wear compression stockings. These stockings help to prevent blood clots and reduce swelling in your legs.  · You will be encouraged to walk as soon as possible. You will also use a device or do breathing exercises to keep your lungs clear.  · You may need to use a maxi-pad for vaginal discharge/bleeding.      Post Surgical Care Instructions.     • You may be discharged from surgery with an abdominal binder, this is given to you for your comfort only. You do not need to wear it unless you feel that it helps with discomfort after your surgery.   • You will have a large abdominal incision, this will sometimes have glue or staples. If you have staples a one week appointment will be made to have them removed during a nurse visit. Sutures will dissolve on their own and glue will wear off over time. Please do not pick the glue.   • Keep incisions clean and dry. Do not use antibacterial washes or ointments on your incisions.   • Small amount of clear or pink tinged drainage from incisions is normal.  • You may have light vaginal bleeding and spotting up to 6 weeks after surgery.  • You will need to continue a bowel regimen after surgery to prevent  constipation or bowel obstruction. Take your stool softener as prescribed. If you do not produce a bowel movent in 24 hours after surgery take a laxative ( milk of magnesia or miralax). Narcotics cause constipation, please take them as directed, try alternating ibuprofen and tylenol before taking the narcotic ( oxycodone, hydrocodone, etc.).

## 2020-08-07 NOTE — TELEPHONE ENCOUNTER
I called and spoke with Sun at  [facility] scheduling.    Appointment scheduled for 08/11/2020 for surgery.     Covid scheduled for 08/09/2020.        I told patient I would email her the instructions after I got them sorted out. She v/u.

## 2020-08-09 ENCOUNTER — APPOINTMENT (OUTPATIENT)
Dept: PREADMISSION TESTING | Facility: HOSPITAL | Age: 70
End: 2020-08-09

## 2020-08-09 PROCEDURE — C9803 HOPD COVID-19 SPEC COLLECT: HCPCS

## 2020-08-09 PROCEDURE — U0002 COVID-19 LAB TEST NON-CDC: HCPCS

## 2020-08-10 ENCOUNTER — TELEPHONE (OUTPATIENT)
Dept: GYNECOLOGIC ONCOLOGY | Facility: CLINIC | Age: 70
End: 2020-08-10

## 2020-08-10 ENCOUNTER — ANESTHESIA EVENT (OUTPATIENT)
Dept: PERIOP | Facility: HOSPITAL | Age: 70
End: 2020-08-10

## 2020-08-10 LAB
REF LAB TEST METHOD: NORMAL
SARS-COV-2 RNA RESP QL NAA+PROBE: NOT DETECTED

## 2020-08-10 RX ORDER — FAMOTIDINE 10 MG/ML
20 INJECTION, SOLUTION INTRAVENOUS ONCE
Status: CANCELLED | OUTPATIENT
Start: 2020-08-10 | End: 2020-08-10

## 2020-08-10 NOTE — TELEPHONE ENCOUNTER
Caller: Hodan Beverly    Relationship to patient: Self    Best call back number: [phone]    Concerns or Questions if Applicable:   Pt has surgery tomorrow, 8.11.20, with Dr Salmeron in the morning but not sure of the arrival time.    She also is having a hard time with colon cleanse prep and needs to talk to somebody about this.

## 2020-08-10 NOTE — TELEPHONE ENCOUNTER
I called patient back.    Advised her to take zofran wait an hour while sipping water.    Finish last dose of bowel prep by sipping ( do not chug )     She v/u.    Advised her to be at hospital at 10 am.

## 2020-08-10 NOTE — ANESTHESIA PREPROCEDURE EVALUATION
Anesthesia Evaluation                  Airway   Mallampati: II  Dental      Pulmonary    (+) asthma,  (-) not a smoker  Cardiovascular     (+) hypertension, dysrhythmias, hyperlipidemia,     ROS comment: 4/2/19-  · Estimated EF = 60%.Left ventricular systolic function is normal.  · Left ventricular diastolic dysfunction (grade I) consistent with impaired relaxation.  · Mild to moderate aortic valve regurgitation is present.    Neuro/Psych  (+) seizures, CVA, psychiatric history,       ROS Comment: 6/6/18 - hCT  -There is a large area of encephalomalacia in the right  temporoparietal region, a chronic, stable finding. There are no acute  abnormalities.  GI/Hepatic/Renal/Endo      Musculoskeletal     Abdominal    Substance History - negative use     OB/GYN          Other      history of cancer    ROS/Med Hx Other: Smooth muscle tumor of uterus; hx of a fib  cva 2015 after thor                Anesthesia Plan    ASA 3     general with block     intravenous induction     Anesthetic plan, all risks, benefits, and alternatives have been provided, discussed and informed consent has been obtained with: patient.

## 2020-08-10 NOTE — TELEPHONE ENCOUNTER
Kelly from Dr. Sandoval office called.    Patient is having diarrhea and vomiting from bowel prep.     She called them because she is on cipro for a uti and holding her eliquis.     Kelly is going to call patient and talk to her about bowel prep and antibiotics.

## 2020-08-11 ENCOUNTER — HOSPITAL ENCOUNTER (INPATIENT)
Facility: HOSPITAL | Age: 70
LOS: 17 days | Discharge: NURSING, REHAB OR LONG-TERM CARE | End: 2020-08-28
Attending: OBSTETRICS & GYNECOLOGY | Admitting: OBSTETRICS & GYNECOLOGY

## 2020-08-11 ENCOUNTER — ANESTHESIA (OUTPATIENT)
Dept: PERIOP | Facility: HOSPITAL | Age: 70
End: 2020-08-11

## 2020-08-11 DIAGNOSIS — D50.0 CHRONIC BLOOD LOSS ANEMIA: ICD-10-CM

## 2020-08-11 DIAGNOSIS — K92.1 MELENA: Primary | ICD-10-CM

## 2020-08-11 DIAGNOSIS — R19.00 PELVIC MASS IN FEMALE: ICD-10-CM

## 2020-08-11 DIAGNOSIS — I63.411 CEREBROVASCULAR ACCIDENT (CVA) DUE TO EMBOLISM OF RIGHT MIDDLE CEREBRAL ARTERY (HCC): ICD-10-CM

## 2020-08-11 LAB
ABO GROUP BLD: NORMAL
ABO GROUP BLD: NORMAL
ALBUMIN SERPL-MCNC: 4.1 G/DL (ref 3.5–5.2)
ALBUMIN/GLOB SERPL: 1.5 G/DL
ALP SERPL-CCNC: 146 U/L (ref 39–117)
ALT SERPL W P-5'-P-CCNC: 24 U/L (ref 1–33)
ANION GAP SERPL CALCULATED.3IONS-SCNC: 11 MMOL/L (ref 5–15)
AST SERPL-CCNC: 29 U/L (ref 1–32)
BASOPHILS # BLD AUTO: 0.01 10*3/MM3 (ref 0–0.2)
BASOPHILS # BLD AUTO: 0.04 10*3/MM3 (ref 0–0.2)
BASOPHILS NFR BLD AUTO: 0.1 % (ref 0–1.5)
BASOPHILS NFR BLD AUTO: 1 % (ref 0–1.5)
BILIRUB SERPL-MCNC: 0.5 MG/DL (ref 0–1.2)
BLD GP AB SCN SERPL QL: NEGATIVE
BUN SERPL-MCNC: 12 MG/DL (ref 8–23)
BUN/CREAT SERPL: 16.9 (ref 7–25)
CALCIUM SPEC-SCNC: 8.8 MG/DL (ref 8.6–10.5)
CHLORIDE SERPL-SCNC: 100 MMOL/L (ref 98–107)
CO2 SERPL-SCNC: 26 MMOL/L (ref 22–29)
CREAT SERPL-MCNC: 0.71 MG/DL (ref 0.57–1)
DEPRECATED RDW RBC AUTO: 43.4 FL (ref 37–54)
DEPRECATED RDW RBC AUTO: 46.5 FL (ref 37–54)
EOSINOPHIL # BLD AUTO: 0 10*3/MM3 (ref 0–0.4)
EOSINOPHIL # BLD AUTO: 0.11 10*3/MM3 (ref 0–0.4)
EOSINOPHIL NFR BLD AUTO: 0 % (ref 0.3–6.2)
EOSINOPHIL NFR BLD AUTO: 2.7 % (ref 0.3–6.2)
ERYTHROCYTE [DISTWIDTH] IN BLOOD BY AUTOMATED COUNT: 15.8 % (ref 12.3–15.4)
ERYTHROCYTE [DISTWIDTH] IN BLOOD BY AUTOMATED COUNT: 16.2 % (ref 12.3–15.4)
GFR SERPL CREATININE-BSD FRML MDRD: 82 ML/MIN/1.73
GLOBULIN UR ELPH-MCNC: 2.8 GM/DL
GLUCOSE SERPL-MCNC: 120 MG/DL (ref 65–99)
HCT VFR BLD AUTO: 29.4 % (ref 34–46.6)
HCT VFR BLD AUTO: 39 % (ref 34–46.6)
HGB BLD-MCNC: 12.1 G/DL (ref 12–15.9)
HGB BLD-MCNC: 9.4 G/DL (ref 12–15.9)
IMM GRANULOCYTES # BLD AUTO: 0.01 10*3/MM3 (ref 0–0.05)
IMM GRANULOCYTES # BLD AUTO: 0.03 10*3/MM3 (ref 0–0.05)
IMM GRANULOCYTES NFR BLD AUTO: 0.2 % (ref 0–0.5)
IMM GRANULOCYTES NFR BLD AUTO: 0.3 % (ref 0–0.5)
LYMPHOCYTES # BLD AUTO: 0.71 10*3/MM3 (ref 0.7–3.1)
LYMPHOCYTES # BLD AUTO: 0.86 10*3/MM3 (ref 0.7–3.1)
LYMPHOCYTES NFR BLD AUTO: 20.9 % (ref 19.6–45.3)
LYMPHOCYTES NFR BLD AUTO: 8.2 % (ref 19.6–45.3)
MCH RBC QN AUTO: 23.8 PG (ref 26.6–33)
MCH RBC QN AUTO: 25.4 PG (ref 26.6–33)
MCHC RBC AUTO-ENTMCNC: 31 G/DL (ref 31.5–35.7)
MCHC RBC AUTO-ENTMCNC: 32 G/DL (ref 31.5–35.7)
MCV RBC AUTO: 76.6 FL (ref 79–97)
MCV RBC AUTO: 79.5 FL (ref 79–97)
MONOCYTES # BLD AUTO: 0.4 10*3/MM3 (ref 0.1–0.9)
MONOCYTES # BLD AUTO: 0.43 10*3/MM3 (ref 0.1–0.9)
MONOCYTES NFR BLD AUTO: 5 % (ref 5–12)
MONOCYTES NFR BLD AUTO: 9.7 % (ref 5–12)
NEUTROPHILS NFR BLD AUTO: 2.7 10*3/MM3 (ref 1.7–7)
NEUTROPHILS NFR BLD AUTO: 65.5 % (ref 42.7–76)
NEUTROPHILS NFR BLD AUTO: 7.46 10*3/MM3 (ref 1.7–7)
NEUTROPHILS NFR BLD AUTO: 86.4 % (ref 42.7–76)
NRBC BLD AUTO-RTO: 0 /100 WBC (ref 0–0.2)
NRBC BLD AUTO-RTO: 0 /100 WBC (ref 0–0.2)
PLATELET # BLD AUTO: 303 10*3/MM3 (ref 140–450)
PLATELET # BLD AUTO: 363 10*3/MM3 (ref 140–450)
PMV BLD AUTO: 9.5 FL (ref 6–12)
PMV BLD AUTO: 9.8 FL (ref 6–12)
POTASSIUM SERPL-SCNC: 3.6 MMOL/L (ref 3.5–5.2)
PROT SERPL-MCNC: 6.9 G/DL (ref 6–8.5)
RBC # BLD AUTO: 3.7 10*6/MM3 (ref 3.77–5.28)
RBC # BLD AUTO: 5.09 10*6/MM3 (ref 3.77–5.28)
RH BLD: POSITIVE
RH BLD: POSITIVE
SODIUM SERPL-SCNC: 137 MMOL/L (ref 136–145)
T&S EXPIRATION DATE: NORMAL
WBC # BLD AUTO: 4.12 10*3/MM3 (ref 3.4–10.8)
WBC # BLD AUTO: 8.64 10*3/MM3 (ref 3.4–10.8)

## 2020-08-11 PROCEDURE — 88331 PATH CONSLTJ SURG 1 BLK 1SPC: CPT | Performed by: PATHOLOGY

## 2020-08-11 PROCEDURE — 44160 REMOVAL OF COLON: CPT | Performed by: OBSTETRICS & GYNECOLOGY

## 2020-08-11 PROCEDURE — 50715 RELEASE OF URETER: CPT | Performed by: OBSTETRICS & GYNECOLOGY

## 2020-08-11 PROCEDURE — 86900 BLOOD TYPING SEROLOGIC ABO: CPT

## 2020-08-11 PROCEDURE — 88307 TISSUE EXAM BY PATHOLOGIST: CPT | Performed by: OBSTETRICS & GYNECOLOGY

## 2020-08-11 PROCEDURE — 85025 COMPLETE CBC W/AUTO DIFF WBC: CPT | Performed by: OBSTETRICS & GYNECOLOGY

## 2020-08-11 PROCEDURE — 93010 ELECTROCARDIOGRAM REPORT: CPT | Performed by: INTERNAL MEDICINE

## 2020-08-11 PROCEDURE — 0T778DZ DILATION OF LEFT URETER WITH INTRALUMINAL DEVICE, VIA NATURAL OR ARTIFICIAL OPENING ENDOSCOPIC: ICD-10-PCS | Performed by: OBSTETRICS & GYNECOLOGY

## 2020-08-11 PROCEDURE — 0TN60ZZ RELEASE RIGHT URETER, OPEN APPROACH: ICD-10-PCS | Performed by: OBSTETRICS & GYNECOLOGY

## 2020-08-11 PROCEDURE — P9016 RBC LEUKOCYTES REDUCED: HCPCS

## 2020-08-11 PROCEDURE — 93005 ELECTROCARDIOGRAM TRACING: CPT | Performed by: ANESTHESIOLOGY

## 2020-08-11 PROCEDURE — 25010000002 NEOSTIGMINE 10 MG/10ML SOLUTION: Performed by: NURSE ANESTHETIST, CERTIFIED REGISTERED

## 2020-08-11 PROCEDURE — 86850 RBC ANTIBODY SCREEN: CPT | Performed by: OBSTETRICS & GYNECOLOGY

## 2020-08-11 PROCEDURE — 25010000002 ONDANSETRON PER 1 MG: Performed by: ANESTHESIOLOGY

## 2020-08-11 PROCEDURE — 52332 CYSTOSCOPY AND TREATMENT: CPT | Performed by: OBSTETRICS & GYNECOLOGY

## 2020-08-11 PROCEDURE — 86920 COMPATIBILITY TEST SPIN: CPT

## 2020-08-11 PROCEDURE — 86901 BLOOD TYPING SEROLOGIC RH(D): CPT | Performed by: OBSTETRICS & GYNECOLOGY

## 2020-08-11 PROCEDURE — P9041 ALBUMIN (HUMAN),5%, 50ML: HCPCS | Performed by: NURSE ANESTHETIST, CERTIFIED REGISTERED

## 2020-08-11 PROCEDURE — 25010000002 DEXAMETHASONE SODIUM PHOSPHATE 10 MG/ML SOLUTION: Performed by: NURSE ANESTHETIST, CERTIFIED REGISTERED

## 2020-08-11 PROCEDURE — 36430 TRANSFUSION BLD/BLD COMPNT: CPT

## 2020-08-11 PROCEDURE — 85025 COMPLETE CBC W/AUTO DIFF WBC: CPT | Performed by: STUDENT IN AN ORGANIZED HEALTH CARE EDUCATION/TRAINING PROGRAM

## 2020-08-11 PROCEDURE — 86900 BLOOD TYPING SEROLOGIC ABO: CPT | Performed by: OBSTETRICS & GYNECOLOGY

## 2020-08-11 PROCEDURE — 80053 COMPREHEN METABOLIC PANEL: CPT | Performed by: OBSTETRICS & GYNECOLOGY

## 2020-08-11 PROCEDURE — 25010000002 HYDROMORPHONE PER 4 MG: Performed by: NURSE ANESTHETIST, CERTIFIED REGISTERED

## 2020-08-11 PROCEDURE — 25010000002 PROPOFOL 10 MG/ML EMULSION: Performed by: NURSE ANESTHETIST, CERTIFIED REGISTERED

## 2020-08-11 PROCEDURE — 25010000002 PHENYLEPHRINE PER 1 ML: Performed by: NURSE ANESTHETIST, CERTIFIED REGISTERED

## 2020-08-11 PROCEDURE — 86901 BLOOD TYPING SEROLOGIC RH(D): CPT

## 2020-08-11 PROCEDURE — 25010000002 SUCCINYLCHOLINE PER 20 MG: Performed by: NURSE ANESTHETIST, CERTIFIED REGISTERED

## 2020-08-11 PROCEDURE — 25010000002 FENTANYL CITRATE (PF) 100 MCG/2ML SOLUTION: Performed by: NURSE ANESTHETIST, CERTIFIED REGISTERED

## 2020-08-11 PROCEDURE — 0TB60ZZ EXCISION OF RIGHT URETER, OPEN APPROACH: ICD-10-PCS | Performed by: OBSTETRICS & GYNECOLOGY

## 2020-08-11 PROCEDURE — 25010000002 MIDAZOLAM PER 1 MG: Performed by: ANESTHESIOLOGY

## 2020-08-11 PROCEDURE — 0DBH0ZZ EXCISION OF CECUM, OPEN APPROACH: ICD-10-PCS | Performed by: OBSTETRICS & GYNECOLOGY

## 2020-08-11 PROCEDURE — C1758 CATHETER, URETERAL: HCPCS | Performed by: OBSTETRICS & GYNECOLOGY

## 2020-08-11 PROCEDURE — 25010000002 BUPRENORPHINE PER 0.1 MG: Performed by: NURSE ANESTHETIST, CERTIFIED REGISTERED

## 2020-08-11 PROCEDURE — 25010000002 ERTAPENEM 1 GM/100ML SOLUTION: Performed by: OBSTETRICS & GYNECOLOGY

## 2020-08-11 PROCEDURE — 25010000002 ALBUMIN HUMAN 5% PER 50 ML: Performed by: NURSE ANESTHETIST, CERTIFIED REGISTERED

## 2020-08-11 DEVICE — PROXIMATE LINEAR CUTTER  RELOAD (THICK)
Type: IMPLANTABLE DEVICE | Site: CECUM | Status: FUNCTIONAL
Brand: PROXIMATE

## 2020-08-11 DEVICE — PROXIMATE LINEAR CUTTER RELOAD, BLUE, 75MM
Type: IMPLANTABLE DEVICE | Status: FUNCTIONAL
Brand: PROXIMATE

## 2020-08-11 DEVICE — FLOSEAL HEMOSTATIC MATRIX, 10 ML
Type: IMPLANTABLE DEVICE | Status: FUNCTIONAL
Brand: FLOSEAL

## 2020-08-11 DEVICE — PROXIMATE RELOADABLE LINEAR CUTTER WITH SAFETY LOCK-OUT, 75MM
Type: IMPLANTABLE DEVICE | Status: FUNCTIONAL
Brand: PROXIMATE

## 2020-08-11 RX ORDER — ACETAMINOPHEN 325 MG/1
650 TABLET ORAL
Status: COMPLETED | OUTPATIENT
Start: 2020-08-11 | End: 2020-08-11

## 2020-08-11 RX ORDER — SUCCINYLCHOLINE CHLORIDE 20 MG/ML
INJECTION INTRAMUSCULAR; INTRAVENOUS AS NEEDED
Status: DISCONTINUED | OUTPATIENT
Start: 2020-08-11 | End: 2020-08-11 | Stop reason: SURG

## 2020-08-11 RX ORDER — MAGNESIUM HYDROXIDE 1200 MG/15ML
LIQUID ORAL AS NEEDED
Status: DISCONTINUED | OUTPATIENT
Start: 2020-08-11 | End: 2020-08-11 | Stop reason: HOSPADM

## 2020-08-11 RX ORDER — HYDROMORPHONE HYDROCHLORIDE 1 MG/ML
0.5 INJECTION, SOLUTION INTRAMUSCULAR; INTRAVENOUS; SUBCUTANEOUS
Status: DISPENSED | OUTPATIENT
Start: 2020-08-11 | End: 2020-08-21

## 2020-08-11 RX ORDER — NEOSTIGMINE METHYLSULFATE 1 MG/ML
INJECTION, SOLUTION INTRAVENOUS AS NEEDED
Status: DISCONTINUED | OUTPATIENT
Start: 2020-08-11 | End: 2020-08-11 | Stop reason: SURG

## 2020-08-11 RX ORDER — MELATONIN
1000 DAILY
Status: DISCONTINUED | OUTPATIENT
Start: 2020-08-12 | End: 2020-08-11

## 2020-08-11 RX ORDER — GLYCOPYRROLATE 0.2 MG/ML
INJECTION INTRAMUSCULAR; INTRAVENOUS AS NEEDED
Status: DISCONTINUED | OUTPATIENT
Start: 2020-08-11 | End: 2020-08-11 | Stop reason: SURG

## 2020-08-11 RX ORDER — ROSUVASTATIN CALCIUM 20 MG/1
20 TABLET, COATED ORAL DAILY
Status: DISCONTINUED | OUTPATIENT
Start: 2020-08-12 | End: 2020-08-16

## 2020-08-11 RX ORDER — EPHEDRINE SULFATE 50 MG/ML
5 INJECTION, SOLUTION INTRAVENOUS ONCE AS NEEDED
Status: DISCONTINUED | OUTPATIENT
Start: 2020-08-11 | End: 2020-08-11 | Stop reason: HOSPADM

## 2020-08-11 RX ORDER — ONDANSETRON 2 MG/ML
4 INJECTION INTRAMUSCULAR; INTRAVENOUS EVERY 6 HOURS PRN
Status: DISCONTINUED | OUTPATIENT
Start: 2020-08-11 | End: 2020-08-21

## 2020-08-11 RX ORDER — NALOXONE HCL 0.4 MG/ML
0.4 VIAL (ML) INJECTION AS NEEDED
Status: DISCONTINUED | OUTPATIENT
Start: 2020-08-11 | End: 2020-08-11 | Stop reason: HOSPADM

## 2020-08-11 RX ORDER — TEMAZEPAM 7.5 MG/1
7.5 CAPSULE ORAL NIGHTLY PRN
Status: DISCONTINUED | OUTPATIENT
Start: 2020-08-11 | End: 2020-08-16

## 2020-08-11 RX ORDER — OXYCODONE HYDROCHLORIDE 5 MG/1
5 TABLET ORAL EVERY 4 HOURS PRN
Status: DISCONTINUED | OUTPATIENT
Start: 2020-08-11 | End: 2020-08-14

## 2020-08-11 RX ORDER — ALBUMIN, HUMAN INJ 5% 5 %
SOLUTION INTRAVENOUS CONTINUOUS PRN
Status: DISCONTINUED | OUTPATIENT
Start: 2020-08-11 | End: 2020-08-11 | Stop reason: SURG

## 2020-08-11 RX ORDER — SODIUM CHLORIDE 0.9 % (FLUSH) 0.9 %
10 SYRINGE (ML) INJECTION AS NEEDED
Status: DISCONTINUED | OUTPATIENT
Start: 2020-08-11 | End: 2020-08-11 | Stop reason: HOSPADM

## 2020-08-11 RX ORDER — MEPERIDINE HYDROCHLORIDE 25 MG/ML
12.5 INJECTION INTRAMUSCULAR; INTRAVENOUS; SUBCUTANEOUS
Status: DISCONTINUED | OUTPATIENT
Start: 2020-08-11 | End: 2020-08-11 | Stop reason: HOSPADM

## 2020-08-11 RX ORDER — FAMOTIDINE 20 MG/1
20 TABLET, FILM COATED ORAL DAILY
Status: DISCONTINUED | OUTPATIENT
Start: 2020-08-12 | End: 2020-08-14

## 2020-08-11 RX ORDER — CETIRIZINE HYDROCHLORIDE 10 MG/1
10 TABLET ORAL DAILY
Status: DISCONTINUED | OUTPATIENT
Start: 2020-08-12 | End: 2020-08-16

## 2020-08-11 RX ORDER — ONDANSETRON 2 MG/ML
4 INJECTION INTRAMUSCULAR; INTRAVENOUS ONCE
Status: COMPLETED | OUTPATIENT
Start: 2020-08-11 | End: 2020-08-11

## 2020-08-11 RX ORDER — PROMETHAZINE HYDROCHLORIDE 12.5 MG/1
12.5 SUPPOSITORY RECTAL EVERY 6 HOURS PRN
Status: DISCONTINUED | OUTPATIENT
Start: 2020-08-11 | End: 2020-08-16

## 2020-08-11 RX ORDER — NALOXONE HCL 0.4 MG/ML
0.4 VIAL (ML) INJECTION
Status: DISCONTINUED | OUTPATIENT
Start: 2020-08-11 | End: 2020-08-21

## 2020-08-11 RX ORDER — PROMETHAZINE HYDROCHLORIDE 25 MG/ML
12.5 INJECTION, SOLUTION INTRAMUSCULAR; INTRAVENOUS EVERY 6 HOURS PRN
Status: DISCONTINUED | OUTPATIENT
Start: 2020-08-11 | End: 2020-08-16

## 2020-08-11 RX ORDER — MIDAZOLAM HYDROCHLORIDE 1 MG/ML
2 INJECTION INTRAMUSCULAR; INTRAVENOUS ONCE
Status: COMPLETED | OUTPATIENT
Start: 2020-08-11 | End: 2020-08-11

## 2020-08-11 RX ORDER — SODIUM CHLORIDE, SODIUM LACTATE, POTASSIUM CHLORIDE, CALCIUM CHLORIDE 600; 310; 30; 20 MG/100ML; MG/100ML; MG/100ML; MG/100ML
100 INJECTION, SOLUTION INTRAVENOUS CONTINUOUS
Status: DISCONTINUED | OUTPATIENT
Start: 2020-08-11 | End: 2020-08-11

## 2020-08-11 RX ORDER — SODIUM CHLORIDE 9 MG/ML
INJECTION, SOLUTION INTRAVENOUS AS NEEDED
Status: DISCONTINUED | OUTPATIENT
Start: 2020-08-11 | End: 2020-08-11 | Stop reason: HOSPADM

## 2020-08-11 RX ORDER — FAMOTIDINE 20 MG/1
20 TABLET, FILM COATED ORAL ONCE
Status: COMPLETED | OUTPATIENT
Start: 2020-08-11 | End: 2020-08-11

## 2020-08-11 RX ORDER — FAMOTIDINE 20 MG/1
20 TABLET, FILM COATED ORAL DAILY
COMMUNITY

## 2020-08-11 RX ORDER — LIDOCAINE HYDROCHLORIDE 10 MG/ML
INJECTION, SOLUTION EPIDURAL; INFILTRATION; INTRACAUDAL; PERINEURAL AS NEEDED
Status: DISCONTINUED | OUTPATIENT
Start: 2020-08-11 | End: 2020-08-11 | Stop reason: SURG

## 2020-08-11 RX ORDER — ROCURONIUM BROMIDE 10 MG/ML
INJECTION, SOLUTION INTRAVENOUS AS NEEDED
Status: DISCONTINUED | OUTPATIENT
Start: 2020-08-11 | End: 2020-08-11 | Stop reason: SURG

## 2020-08-11 RX ORDER — BUPIVACAINE HYDROCHLORIDE 2.5 MG/ML
INJECTION, SOLUTION EPIDURAL; INFILTRATION; INTRACAUDAL
Status: COMPLETED | OUTPATIENT
Start: 2020-08-11 | End: 2020-08-11

## 2020-08-11 RX ORDER — DEXAMETHASONE SODIUM PHOSPHATE 10 MG/ML
INJECTION, SOLUTION INTRAMUSCULAR; INTRAVENOUS
Status: COMPLETED | OUTPATIENT
Start: 2020-08-11 | End: 2020-08-11

## 2020-08-11 RX ORDER — PANTOPRAZOLE SODIUM 40 MG/1
40 TABLET, DELAYED RELEASE ORAL
Status: DISCONTINUED | OUTPATIENT
Start: 2020-08-12 | End: 2020-08-14

## 2020-08-11 RX ORDER — SODIUM CHLORIDE 0.9 % (FLUSH) 0.9 %
10 SYRINGE (ML) INJECTION EVERY 12 HOURS SCHEDULED
Status: DISCONTINUED | OUTPATIENT
Start: 2020-08-11 | End: 2020-08-11 | Stop reason: HOSPADM

## 2020-08-11 RX ORDER — TRIMETHOPRIM 100 MG/1
100 TABLET ORAL EVERY 12 HOURS SCHEDULED
Status: DISCONTINUED | OUTPATIENT
Start: 2020-08-11 | End: 2020-08-16

## 2020-08-11 RX ORDER — ONDANSETRON 4 MG/1
4 TABLET, FILM COATED ORAL EVERY 6 HOURS PRN
Status: DISCONTINUED | OUTPATIENT
Start: 2020-08-11 | End: 2020-08-16

## 2020-08-11 RX ORDER — GABAPENTIN 400 MG/1
1200 CAPSULE ORAL DAILY
Status: DISCONTINUED | OUTPATIENT
Start: 2020-08-12 | End: 2020-08-11

## 2020-08-11 RX ORDER — IBUPROFEN 600 MG/1
600 TABLET ORAL EVERY 6 HOURS PRN
Status: DISCONTINUED | OUTPATIENT
Start: 2020-08-11 | End: 2020-08-14

## 2020-08-11 RX ORDER — FLUOXETINE HYDROCHLORIDE 20 MG/1
40 CAPSULE ORAL DAILY
Status: DISCONTINUED | OUTPATIENT
Start: 2020-08-12 | End: 2020-08-28

## 2020-08-11 RX ORDER — SODIUM CHLORIDE, SODIUM LACTATE, POTASSIUM CHLORIDE, CALCIUM CHLORIDE 600; 310; 30; 20 MG/100ML; MG/100ML; MG/100ML; MG/100ML
9 INJECTION, SOLUTION INTRAVENOUS CONTINUOUS
Status: DISCONTINUED | OUTPATIENT
Start: 2020-08-11 | End: 2020-08-11

## 2020-08-11 RX ORDER — PROPOFOL 10 MG/ML
VIAL (ML) INTRAVENOUS AS NEEDED
Status: DISCONTINUED | OUTPATIENT
Start: 2020-08-11 | End: 2020-08-11 | Stop reason: SURG

## 2020-08-11 RX ORDER — ACETAMINOPHEN 325 MG/1
650 TABLET ORAL EVERY 6 HOURS SCHEDULED
Status: DISCONTINUED | OUTPATIENT
Start: 2020-08-11 | End: 2020-08-15

## 2020-08-11 RX ORDER — ONDANSETRON 2 MG/ML
4 INJECTION INTRAMUSCULAR; INTRAVENOUS ONCE AS NEEDED
Status: DISCONTINUED | OUTPATIENT
Start: 2020-08-11 | End: 2020-08-11 | Stop reason: HOSPADM

## 2020-08-11 RX ORDER — LIDOCAINE HYDROCHLORIDE 10 MG/ML
0.5 INJECTION, SOLUTION EPIDURAL; INFILTRATION; INTRACAUDAL; PERINEURAL ONCE AS NEEDED
Status: COMPLETED | OUTPATIENT
Start: 2020-08-11 | End: 2020-08-11

## 2020-08-11 RX ORDER — PANTOPRAZOLE SODIUM 40 MG/1
40 TABLET, DELAYED RELEASE ORAL EVERY MORNING
Status: DISCONTINUED | OUTPATIENT
Start: 2020-08-12 | End: 2020-08-11

## 2020-08-11 RX ORDER — FENTANYL CITRATE 50 UG/ML
50 INJECTION, SOLUTION INTRAMUSCULAR; INTRAVENOUS
Status: DISCONTINUED | OUTPATIENT
Start: 2020-08-11 | End: 2020-08-11 | Stop reason: HOSPADM

## 2020-08-11 RX ORDER — BUPRENORPHINE HYDROCHLORIDE 0.32 MG/ML
INJECTION INTRAMUSCULAR; INTRAVENOUS
Status: COMPLETED | OUTPATIENT
Start: 2020-08-11 | End: 2020-08-11

## 2020-08-11 RX ORDER — DIPHENHYDRAMINE HCL 25 MG
25 CAPSULE ORAL EVERY 6 HOURS PRN
Status: DISCONTINUED | OUTPATIENT
Start: 2020-08-11 | End: 2020-08-14

## 2020-08-11 RX ORDER — LABETALOL HYDROCHLORIDE 5 MG/ML
5 INJECTION, SOLUTION INTRAVENOUS
Status: DISCONTINUED | OUTPATIENT
Start: 2020-08-11 | End: 2020-08-11 | Stop reason: HOSPADM

## 2020-08-11 RX ORDER — HYDROMORPHONE HYDROCHLORIDE 1 MG/ML
0.5 INJECTION, SOLUTION INTRAMUSCULAR; INTRAVENOUS; SUBCUTANEOUS
Status: DISCONTINUED | OUTPATIENT
Start: 2020-08-11 | End: 2020-08-11 | Stop reason: HOSPADM

## 2020-08-11 RX ORDER — NALOXONE HCL 0.4 MG/ML
0.1 VIAL (ML) INJECTION
Status: DISCONTINUED | OUTPATIENT
Start: 2020-08-11 | End: 2020-08-28 | Stop reason: HOSPADM

## 2020-08-11 RX ORDER — GABAPENTIN 300 MG/1
300 CAPSULE ORAL
Status: DISCONTINUED | OUTPATIENT
Start: 2020-08-11 | End: 2020-08-16

## 2020-08-11 RX ORDER — PROMETHAZINE HYDROCHLORIDE 25 MG/ML
12.5 INJECTION, SOLUTION INTRAMUSCULAR; INTRAVENOUS EVERY 6 HOURS PRN
Status: DISCONTINUED | OUTPATIENT
Start: 2020-08-11 | End: 2020-08-21

## 2020-08-11 RX ORDER — FENTANYL CITRATE 50 UG/ML
INJECTION, SOLUTION INTRAMUSCULAR; INTRAVENOUS AS NEEDED
Status: DISCONTINUED | OUTPATIENT
Start: 2020-08-11 | End: 2020-08-11 | Stop reason: SURG

## 2020-08-11 RX ORDER — ALBUMIN, HUMAN INJ 5% 5 %
250 SOLUTION INTRAVENOUS ONCE
Status: COMPLETED | OUTPATIENT
Start: 2020-08-11 | End: 2020-08-11

## 2020-08-11 RX ORDER — PROMETHAZINE HYDROCHLORIDE 12.5 MG/1
12.5 TABLET ORAL EVERY 6 HOURS PRN
Status: DISCONTINUED | OUTPATIENT
Start: 2020-08-11 | End: 2020-08-16

## 2020-08-11 RX ORDER — PROPOFOL 10 MG/ML
VIAL (ML) INTRAVENOUS CONTINUOUS PRN
Status: DISCONTINUED | OUTPATIENT
Start: 2020-08-11 | End: 2020-08-11 | Stop reason: SURG

## 2020-08-11 RX ORDER — OXYCODONE HYDROCHLORIDE 5 MG/1
10 TABLET ORAL EVERY 4 HOURS PRN
Status: DISCONTINUED | OUTPATIENT
Start: 2020-08-11 | End: 2020-08-14

## 2020-08-11 RX ORDER — SODIUM CHLORIDE, SODIUM LACTATE, POTASSIUM CHLORIDE, CALCIUM CHLORIDE 600; 310; 30; 20 MG/100ML; MG/100ML; MG/100ML; MG/100ML
50 INJECTION, SOLUTION INTRAVENOUS CONTINUOUS
Status: DISCONTINUED | OUTPATIENT
Start: 2020-08-11 | End: 2020-08-21

## 2020-08-11 RX ADMIN — ROCURONIUM BROMIDE 10 MG: 10 INJECTION INTRAVENOUS at 12:45

## 2020-08-11 RX ADMIN — PHENYLEPHRINE HYDROCHLORIDE 80 MCG: 10 INJECTION, SOLUTION INTRAMUSCULAR; INTRAVENOUS; SUBCUTANEOUS at 16:28

## 2020-08-11 RX ADMIN — ONDANSETRON HYDROCHLORIDE 4 MG: 2 SOLUTION INTRAMUSCULAR; INTRAVENOUS at 12:26

## 2020-08-11 RX ADMIN — SODIUM CHLORIDE, POTASSIUM CHLORIDE, SODIUM LACTATE AND CALCIUM CHLORIDE: 600; 310; 30; 20 INJECTION, SOLUTION INTRAVENOUS at 14:42

## 2020-08-11 RX ADMIN — ROCURONIUM BROMIDE 20 MG: 10 INJECTION INTRAVENOUS at 14:25

## 2020-08-11 RX ADMIN — ALBUMIN HUMAN 250 ML: 0.05 INJECTION, SOLUTION INTRAVENOUS at 16:54

## 2020-08-11 RX ADMIN — ACETAMINOPHEN 650 MG: 325 TABLET ORAL at 11:33

## 2020-08-11 RX ADMIN — SODIUM CHLORIDE, POTASSIUM CHLORIDE, SODIUM LACTATE AND CALCIUM CHLORIDE: 600; 310; 30; 20 INJECTION, SOLUTION INTRAVENOUS at 16:04

## 2020-08-11 RX ADMIN — GLYCOPYRROLATE 0.4 MG: 0.2 INJECTION INTRAMUSCULAR; INTRAVENOUS at 16:30

## 2020-08-11 RX ADMIN — PROPOFOL 150 MG: 10 INJECTION, EMULSION INTRAVENOUS at 12:45

## 2020-08-11 RX ADMIN — PHENYLEPHRINE HYDROCHLORIDE 80 MCG: 10 INJECTION, SOLUTION INTRAMUSCULAR; INTRAVENOUS; SUBCUTANEOUS at 16:03

## 2020-08-11 RX ADMIN — PHENYLEPHRINE HYDROCHLORIDE 80 MCG: 10 INJECTION, SOLUTION INTRAMUSCULAR; INTRAVENOUS; SUBCUTANEOUS at 15:48

## 2020-08-11 RX ADMIN — ACETAMINOPHEN 650 MG: 325 TABLET, FILM COATED ORAL at 21:50

## 2020-08-11 RX ADMIN — SODIUM CHLORIDE, POTASSIUM CHLORIDE, SODIUM LACTATE AND CALCIUM CHLORIDE: 600; 310; 30; 20 INJECTION, SOLUTION INTRAVENOUS at 15:07

## 2020-08-11 RX ADMIN — PHENYLEPHRINE HYDROCHLORIDE 160 MCG: 10 INJECTION, SOLUTION INTRAMUSCULAR; INTRAVENOUS; SUBCUTANEOUS at 14:48

## 2020-08-11 RX ADMIN — FENTANYL CITRATE 50 MCG: 50 INJECTION, SOLUTION INTRAMUSCULAR; INTRAVENOUS at 12:45

## 2020-08-11 RX ADMIN — PHENYLEPHRINE HYDROCHLORIDE 80 MCG: 10 INJECTION, SOLUTION INTRAMUSCULAR; INTRAVENOUS; SUBCUTANEOUS at 16:10

## 2020-08-11 RX ADMIN — PHENYLEPHRINE HYDROCHLORIDE 80 MCG: 10 INJECTION, SOLUTION INTRAMUSCULAR; INTRAVENOUS; SUBCUTANEOUS at 16:07

## 2020-08-11 RX ADMIN — PROPOFOL 25 MCG/KG/MIN: 10 INJECTION, EMULSION INTRAVENOUS at 12:55

## 2020-08-11 RX ADMIN — BUPIVACAINE HYDROCHLORIDE 60 ML: 2.5 INJECTION, SOLUTION EPIDURAL; INFILTRATION; INTRACAUDAL; PERINEURAL at 12:49

## 2020-08-11 RX ADMIN — PHENYLEPHRINE HYDROCHLORIDE 80 MCG: 10 INJECTION, SOLUTION INTRAMUSCULAR; INTRAVENOUS; SUBCUTANEOUS at 15:52

## 2020-08-11 RX ADMIN — SUCCINYLCHOLINE CHLORIDE 120 MG: 20 INJECTION, SOLUTION INTRAMUSCULAR; INTRAVENOUS; PARENTERAL at 12:45

## 2020-08-11 RX ADMIN — NEOSTIGMINE 3 MG: 1 INJECTION INTRAVENOUS at 16:30

## 2020-08-11 RX ADMIN — LIDOCAINE HYDROCHLORIDE 50 MG: 10 INJECTION, SOLUTION EPIDURAL; INFILTRATION; INTRACAUDAL; PERINEURAL at 12:45

## 2020-08-11 RX ADMIN — PHENYLEPHRINE HYDROCHLORIDE 80 MCG: 10 INJECTION, SOLUTION INTRAMUSCULAR; INTRAVENOUS; SUBCUTANEOUS at 15:39

## 2020-08-11 RX ADMIN — PHENYLEPHRINE HYDROCHLORIDE 80 MCG: 10 INJECTION, SOLUTION INTRAMUSCULAR; INTRAVENOUS; SUBCUTANEOUS at 13:12

## 2020-08-11 RX ADMIN — PHENYLEPHRINE HYDROCHLORIDE 80 MCG: 10 INJECTION, SOLUTION INTRAMUSCULAR; INTRAVENOUS; SUBCUTANEOUS at 16:19

## 2020-08-11 RX ADMIN — ALBUMIN HUMAN: 0.05 INJECTION, SOLUTION INTRAVENOUS at 14:44

## 2020-08-11 RX ADMIN — PHENYLEPHRINE HYDROCHLORIDE 80 MCG: 10 INJECTION, SOLUTION INTRAMUSCULAR; INTRAVENOUS; SUBCUTANEOUS at 15:55

## 2020-08-11 RX ADMIN — PHENYLEPHRINE HYDROCHLORIDE 80 MCG: 10 INJECTION, SOLUTION INTRAMUSCULAR; INTRAVENOUS; SUBCUTANEOUS at 15:42

## 2020-08-11 RX ADMIN — HYDROMORPHONE HYDROCHLORIDE 0.5 MG: 1 INJECTION, SOLUTION INTRAMUSCULAR; INTRAVENOUS; SUBCUTANEOUS at 17:57

## 2020-08-11 RX ADMIN — LIDOCAINE HYDROCHLORIDE 0.5 ML: 10 INJECTION, SOLUTION EPIDURAL; INFILTRATION; INTRACAUDAL; PERINEURAL at 11:27

## 2020-08-11 RX ADMIN — BUPRENORPHINE HYDROCHLORIDE 0.3 MG: 0.32 INJECTION INTRAMUSCULAR; INTRAVENOUS at 12:49

## 2020-08-11 RX ADMIN — PHENYLEPHRINE HYDROCHLORIDE 80 MCG: 10 INJECTION, SOLUTION INTRAMUSCULAR; INTRAVENOUS; SUBCUTANEOUS at 16:24

## 2020-08-11 RX ADMIN — ROCURONIUM BROMIDE 30 MG: 10 INJECTION INTRAVENOUS at 12:59

## 2020-08-11 RX ADMIN — DEXAMETHASONE SODIUM PHOSPHATE 4 MG: 10 INJECTION INTRAMUSCULAR; INTRAVENOUS at 12:49

## 2020-08-11 RX ADMIN — TRIMETHOPRIM 100 MG: 100 TABLET ORAL at 21:50

## 2020-08-11 RX ADMIN — PHENYLEPHRINE HYDROCHLORIDE 80 MCG: 10 INJECTION, SOLUTION INTRAMUSCULAR; INTRAVENOUS; SUBCUTANEOUS at 16:14

## 2020-08-11 RX ADMIN — MIDAZOLAM 1 MG: 1 INJECTION INTRAMUSCULAR; INTRAVENOUS at 12:26

## 2020-08-11 RX ADMIN — ERTAPENEM SODIUM 1 G: 1 INJECTION, POWDER, LYOPHILIZED, FOR SOLUTION INTRAMUSCULAR; INTRAVENOUS at 12:38

## 2020-08-11 RX ADMIN — GABAPENTIN 300 MG: 300 CAPSULE ORAL at 21:50

## 2020-08-11 RX ADMIN — PHENYLEPHRINE HYDROCHLORIDE 80 MCG: 10 INJECTION, SOLUTION INTRAMUSCULAR; INTRAVENOUS; SUBCUTANEOUS at 14:41

## 2020-08-11 RX ADMIN — PHENYLEPHRINE HYDROCHLORIDE 80 MCG: 10 INJECTION, SOLUTION INTRAMUSCULAR; INTRAVENOUS; SUBCUTANEOUS at 15:10

## 2020-08-11 RX ADMIN — SODIUM CHLORIDE, POTASSIUM CHLORIDE, SODIUM LACTATE AND CALCIUM CHLORIDE 9 ML/HR: 600; 310; 30; 20 INJECTION, SOLUTION INTRAVENOUS at 11:27

## 2020-08-11 RX ADMIN — FAMOTIDINE 20 MG: 20 TABLET, FILM COATED ORAL at 11:33

## 2020-08-11 NOTE — ANESTHESIA POSTPROCEDURE EVALUATION
Patient: Hodan Beverly    Procedure Summary     Date:  08/11/20 Room / Location:   [facility]    Anesthesia Start:  1238 Anesthesia Stop:      Procedure:  EXPLORATORY LAPAROTOMY, DEBULKING OF MASS,RIGHT URETERAL LYSIS,ILEOCECULM RESECTION WITH SIDE TO SIDE ANASTOMOSIS, CYSTOSCOPY WITH TEMPORARY LEFT URETERAL STENT (Bilateral Abdomen) Diagnosis:       Pelvic mass in female      (Pelvic mass in female [R19.00])    Surgeon:  Aleida Salmeron MD Provider:  Isaías Del Real MD    Anesthesia Type:  general with block ASA Status:  3          Anesthesia Type: general with block    Vitals  Vitals Value Taken Time   BP     Temp     Pulse 67 8/11/2020  4:48 PM   Resp     SpO2     Vitals shown include unvalidated device data.        Post Anesthesia Care and Evaluation    Patient location during evaluation: PACU  Patient participation: complete - patient participated  Level of consciousness: awake and alert  Pain score: 0  Pain management: adequate  Airway patency: patent  Anesthetic complications: No anesthetic complications  PONV Status: none  Cardiovascular status: hemodynamically stable and acceptable  Respiratory status: nonlabored ventilation, acceptable and nasal cannula  Hydration status: acceptable

## 2020-08-11 NOTE — ANESTHESIA PROCEDURE NOTES
Airway  Urgency: elective    Date/Time: 8/11/2020 12:46 PM  Airway not difficult    General Information and Staff    Patient location during procedure: OR  CRNA: Nitin Balbuena CRNA    Indications and Patient Condition  Indications for airway management: airway protection    Preoxygenated: yes  MILS not maintained throughout  Mask difficulty assessment: 1 - vent by mask    Final Airway Details  Final airway type: endotracheal airway      Successful airway: ETT  Cuffed: yes   Successful intubation technique: direct laryngoscopy  Endotracheal tube insertion site: oral  Blade: Mindi  Blade size: 3  ETT size (mm): 7.0  Cormack-Lehane Classification: grade IIa - partial view of glottis  Placement verified by: chest auscultation and capnometry   Cuff volume (mL): 5  Measured from: lips  ETT/EBT  to lips (cm): 21  Number of attempts at approach: 1  Assessment: lips, teeth, and gum same as pre-op and atraumatic intubation    Additional Comments  Negative epigastric sounds, Breath sound equal bilaterally with symmetric chest rise and fall

## 2020-08-11 NOTE — ANESTHESIA PROCEDURE NOTES
B TAP      Patient reassessed immediately prior to procedure    Patient location during procedure: OR  Reason for block: at surgeon's request and post-op pain management  Performed by  Anesthesiologist: Roland Rubio MD  Preanesthetic Checklist  Completed: patient identified, site marked, surgical consent, pre-op evaluation, timeout performed, IV checked, risks and benefits discussed and monitors and equipment checked  Prep:  Pt Position: supine  Sterile barriers:cap, gloves, sterile barriers and mask  Prep: ChloraPrep  Patient monitoring: blood pressure monitoring, continuous pulse oximetry and EKG  Procedure  Sedation:yes  Performed under: general  Guidance:ultrasound guided  Images:still images obtained, printed/placed on chart    Laterality:Bilateral  Block Type:TAP  Injection Technique:single-shot  Needle Type:short-bevel and echogenic  Needle Gauge:20 G  Resistance on Injection: none    Medications Used: buprenorphine (BUPRENEX) injection, 0.3 mg  dexamethasone sodium phosphate injection, 4 mg  bupivacaine PF (MARCAINE) 0.25 % injection, 60 mL      Medications  Comment:Block Injection:  LA dose divided between Right and Left block        Post Assessment  Injection Assessment: negative aspiration for heme, incremental injection and no paresthesia on injection  Patient Tolerance:comfortable throughout block  Complications:no  Additional Notes      Under Ultrasound guidance, a BBraun 4inch 360 degree needle was advanced with Normal Saline hydro dissection of tissue.  The Internal Oblique and Transversus Abdominus muscles where visualized.  At or before the aponeurosis of Internal Oblique, local anesthetic spread was visualized in the Transversus Abdominus Plane. Injection was made incrementally with aspiration every 5 mls.  There was no  intravascular injection,  injection pressure was normal, there was no neural injection, and the procedure was completed without difficulty.  Thank You.

## 2020-08-12 LAB
ANION GAP SERPL CALCULATED.3IONS-SCNC: 10 MMOL/L (ref 5–15)
BUN SERPL-MCNC: 14 MG/DL (ref 8–23)
BUN/CREAT SERPL: 29.8 (ref 7–25)
CALCIUM SPEC-SCNC: 7.8 MG/DL (ref 8.6–10.5)
CHLORIDE SERPL-SCNC: 101 MMOL/L (ref 98–107)
CO2 SERPL-SCNC: 23 MMOL/L (ref 22–29)
CREAT SERPL-MCNC: 0.47 MG/DL (ref 0.57–1)
DEPRECATED RDW RBC AUTO: 48.1 FL (ref 37–54)
ERYTHROCYTE [DISTWIDTH] IN BLOOD BY AUTOMATED COUNT: 16.4 % (ref 12.3–15.4)
GFR SERPL CREATININE-BSD FRML MDRD: 132 ML/MIN/1.73
GLUCOSE SERPL-MCNC: 167 MG/DL (ref 65–99)
HCT VFR BLD AUTO: 29.2 % (ref 34–46.6)
HGB BLD-MCNC: 9.2 G/DL (ref 12–15.9)
MCH RBC QN AUTO: 25.6 PG (ref 26.6–33)
MCHC RBC AUTO-ENTMCNC: 31.5 G/DL (ref 31.5–35.7)
MCV RBC AUTO: 81.1 FL (ref 79–97)
PLATELET # BLD AUTO: 326 10*3/MM3 (ref 140–450)
PMV BLD AUTO: 9.6 FL (ref 6–12)
POTASSIUM SERPL-SCNC: 4.1 MMOL/L (ref 3.5–5.2)
RBC # BLD AUTO: 3.6 10*6/MM3 (ref 3.77–5.28)
SODIUM SERPL-SCNC: 134 MMOL/L (ref 136–145)
WBC # BLD AUTO: 7.66 10*3/MM3 (ref 3.4–10.8)

## 2020-08-12 PROCEDURE — 85027 COMPLETE CBC AUTOMATED: CPT | Performed by: STUDENT IN AN ORGANIZED HEALTH CARE EDUCATION/TRAINING PROGRAM

## 2020-08-12 PROCEDURE — 25010000002 ONDANSETRON PER 1 MG: Performed by: STUDENT IN AN ORGANIZED HEALTH CARE EDUCATION/TRAINING PROGRAM

## 2020-08-12 PROCEDURE — 25010000002 ENOXAPARIN PER 10 MG: Performed by: STUDENT IN AN ORGANIZED HEALTH CARE EDUCATION/TRAINING PROGRAM

## 2020-08-12 PROCEDURE — 80048 BASIC METABOLIC PNL TOTAL CA: CPT | Performed by: STUDENT IN AN ORGANIZED HEALTH CARE EDUCATION/TRAINING PROGRAM

## 2020-08-12 PROCEDURE — 25010000002 PROMETHAZINE PER 50 MG: Performed by: STUDENT IN AN ORGANIZED HEALTH CARE EDUCATION/TRAINING PROGRAM

## 2020-08-12 PROCEDURE — 25010000002 HYDROMORPHONE PER 4 MG: Performed by: STUDENT IN AN ORGANIZED HEALTH CARE EDUCATION/TRAINING PROGRAM

## 2020-08-12 RX ADMIN — GABAPENTIN 300 MG: 300 CAPSULE ORAL at 17:15

## 2020-08-12 RX ADMIN — ACETAMINOPHEN 650 MG: 325 TABLET, FILM COATED ORAL at 05:04

## 2020-08-12 RX ADMIN — OXYCODONE 10 MG: 5 TABLET ORAL at 14:06

## 2020-08-12 RX ADMIN — FAMOTIDINE 20 MG: 20 TABLET, FILM COATED ORAL at 08:55

## 2020-08-12 RX ADMIN — GABAPENTIN 300 MG: 300 CAPSULE ORAL at 08:55

## 2020-08-12 RX ADMIN — TRIMETHOPRIM 100 MG: 100 TABLET ORAL at 08:55

## 2020-08-12 RX ADMIN — SODIUM CHLORIDE, POTASSIUM CHLORIDE, SODIUM LACTATE AND CALCIUM CHLORIDE 75 ML/HR: 600; 310; 30; 20 INJECTION, SOLUTION INTRAVENOUS at 05:05

## 2020-08-12 RX ADMIN — FLUOXETINE HYDROCHLORIDE 40 MG: 20 CAPSULE ORAL at 08:55

## 2020-08-12 RX ADMIN — GABAPENTIN 300 MG: 300 CAPSULE ORAL at 12:18

## 2020-08-12 RX ADMIN — CETIRIZINE HYDROCHLORIDE 10 MG: 10 TABLET, FILM COATED ORAL at 08:55

## 2020-08-12 RX ADMIN — SODIUM CHLORIDE, POTASSIUM CHLORIDE, SODIUM LACTATE AND CALCIUM CHLORIDE 500 ML: 600; 310; 30; 20 INJECTION, SOLUTION INTRAVENOUS at 06:22

## 2020-08-12 RX ADMIN — ENOXAPARIN SODIUM 40 MG: 40 INJECTION SUBCUTANEOUS at 08:55

## 2020-08-12 RX ADMIN — HYDROMORPHONE HYDROCHLORIDE 0.5 MG: 1 INJECTION, SOLUTION INTRAMUSCULAR; INTRAVENOUS; SUBCUTANEOUS at 22:08

## 2020-08-12 RX ADMIN — ROSUVASTATIN CALCIUM 20 MG: 20 TABLET, COATED ORAL at 08:55

## 2020-08-12 RX ADMIN — PROMETHAZINE HYDROCHLORIDE 12.5 MG: 25 INJECTION INTRAMUSCULAR; INTRAVENOUS at 20:27

## 2020-08-12 RX ADMIN — METOPROLOL TARTRATE 25 MG: 25 TABLET, FILM COATED ORAL at 08:55

## 2020-08-12 RX ADMIN — ONDANSETRON 4 MG: 2 INJECTION INTRAMUSCULAR; INTRAVENOUS at 18:29

## 2020-08-12 RX ADMIN — ACETAMINOPHEN 650 MG: 325 TABLET, FILM COATED ORAL at 17:15

## 2020-08-12 RX ADMIN — IBUPROFEN 600 MG: 600 TABLET, FILM COATED ORAL at 10:23

## 2020-08-12 RX ADMIN — PANTOPRAZOLE SODIUM 40 MG: 40 TABLET, DELAYED RELEASE ORAL at 05:05

## 2020-08-12 RX ADMIN — ACETAMINOPHEN 650 MG: 325 TABLET, FILM COATED ORAL at 12:18

## 2020-08-12 RX ADMIN — OXYCODONE 5 MG: 5 TABLET ORAL at 09:02

## 2020-08-13 ENCOUNTER — APPOINTMENT (OUTPATIENT)
Dept: GENERAL RADIOLOGY | Facility: HOSPITAL | Age: 70
End: 2020-08-13

## 2020-08-13 LAB
ANION GAP SERPL CALCULATED.3IONS-SCNC: 7 MMOL/L (ref 5–15)
BUN SERPL-MCNC: 15 MG/DL (ref 8–23)
BUN/CREAT SERPL: 31.9 (ref 7–25)
CALCIUM SPEC-SCNC: 8 MG/DL (ref 8.6–10.5)
CHLORIDE SERPL-SCNC: 101 MMOL/L (ref 98–107)
CO2 SERPL-SCNC: 26 MMOL/L (ref 22–29)
CREAT SERPL-MCNC: 0.47 MG/DL (ref 0.57–1)
CYTO UR: NORMAL
DEPRECATED RDW RBC AUTO: 47.6 FL (ref 37–54)
ERYTHROCYTE [DISTWIDTH] IN BLOOD BY AUTOMATED COUNT: 16.4 % (ref 12.3–15.4)
GFR SERPL CREATININE-BSD FRML MDRD: 132 ML/MIN/1.73
GLUCOSE SERPL-MCNC: 132 MG/DL (ref 65–99)
HCT VFR BLD AUTO: 30 % (ref 34–46.6)
HGB BLD-MCNC: 9.4 G/DL (ref 12–15.9)
LAB AP CASE REPORT: NORMAL
LAB AP CLINICAL INFORMATION: NORMAL
Lab: NORMAL
MCH RBC QN AUTO: 25.3 PG (ref 26.6–33)
MCHC RBC AUTO-ENTMCNC: 31.3 G/DL (ref 31.5–35.7)
MCV RBC AUTO: 80.9 FL (ref 79–97)
PATH REPORT.FINAL DX SPEC: NORMAL
PATH REPORT.GROSS SPEC: NORMAL
PLATELET # BLD AUTO: 373 10*3/MM3 (ref 140–450)
PMV BLD AUTO: 9.9 FL (ref 6–12)
POTASSIUM SERPL-SCNC: 4.2 MMOL/L (ref 3.5–5.2)
RBC # BLD AUTO: 3.71 10*6/MM3 (ref 3.77–5.28)
SODIUM SERPL-SCNC: 134 MMOL/L (ref 136–145)
WBC # BLD AUTO: 8.89 10*3/MM3 (ref 3.4–10.8)

## 2020-08-13 PROCEDURE — 97166 OT EVAL MOD COMPLEX 45 MIN: CPT

## 2020-08-13 PROCEDURE — 85027 COMPLETE CBC AUTOMATED: CPT | Performed by: STUDENT IN AN ORGANIZED HEALTH CARE EDUCATION/TRAINING PROGRAM

## 2020-08-13 PROCEDURE — 25010000002 HYDROMORPHONE PER 4 MG: Performed by: STUDENT IN AN ORGANIZED HEALTH CARE EDUCATION/TRAINING PROGRAM

## 2020-08-13 PROCEDURE — 63710000001 ONDANSETRON PER 8 MG: Performed by: STUDENT IN AN ORGANIZED HEALTH CARE EDUCATION/TRAINING PROGRAM

## 2020-08-13 PROCEDURE — 25010000002 ENOXAPARIN PER 10 MG: Performed by: STUDENT IN AN ORGANIZED HEALTH CARE EDUCATION/TRAINING PROGRAM

## 2020-08-13 PROCEDURE — 97530 THERAPEUTIC ACTIVITIES: CPT

## 2020-08-13 PROCEDURE — 97162 PT EVAL MOD COMPLEX 30 MIN: CPT

## 2020-08-13 PROCEDURE — 25010000002 ONDANSETRON PER 1 MG: Performed by: STUDENT IN AN ORGANIZED HEALTH CARE EDUCATION/TRAINING PROGRAM

## 2020-08-13 PROCEDURE — 74022 RADEX COMPL AQT ABD SERIES: CPT

## 2020-08-13 PROCEDURE — 80048 BASIC METABOLIC PNL TOTAL CA: CPT | Performed by: STUDENT IN AN ORGANIZED HEALTH CARE EDUCATION/TRAINING PROGRAM

## 2020-08-13 PROCEDURE — 25010000002 LORAZEPAM PER 2 MG: Performed by: OBSTETRICS & GYNECOLOGY

## 2020-08-13 PROCEDURE — 25010000002 PROMETHAZINE PER 50 MG: Performed by: STUDENT IN AN ORGANIZED HEALTH CARE EDUCATION/TRAINING PROGRAM

## 2020-08-13 RX ORDER — MAGNESIUM SULFATE HEPTAHYDRATE 40 MG/ML
2 INJECTION, SOLUTION INTRAVENOUS AS NEEDED
Status: DISCONTINUED | OUTPATIENT
Start: 2020-08-13 | End: 2020-08-28 | Stop reason: HOSPADM

## 2020-08-13 RX ORDER — POTASSIUM CHLORIDE 1.5 G/1.77G
40 POWDER, FOR SOLUTION ORAL AS NEEDED
Status: DISCONTINUED | OUTPATIENT
Start: 2020-08-13 | End: 2020-08-14

## 2020-08-13 RX ORDER — LORAZEPAM 2 MG/ML
0.5 INJECTION INTRAMUSCULAR EVERY 4 HOURS PRN
Status: DISPENSED | OUTPATIENT
Start: 2020-08-13 | End: 2020-08-23

## 2020-08-13 RX ORDER — POTASSIUM CHLORIDE 750 MG/1
40 CAPSULE, EXTENDED RELEASE ORAL AS NEEDED
Status: DISCONTINUED | OUTPATIENT
Start: 2020-08-13 | End: 2020-08-15 | Stop reason: SDUPTHER

## 2020-08-13 RX ORDER — MAGNESIUM SULFATE HEPTAHYDRATE 40 MG/ML
4 INJECTION, SOLUTION INTRAVENOUS AS NEEDED
Status: DISCONTINUED | OUTPATIENT
Start: 2020-08-13 | End: 2020-08-28 | Stop reason: HOSPADM

## 2020-08-13 RX ADMIN — ENOXAPARIN SODIUM 40 MG: 40 INJECTION SUBCUTANEOUS at 08:41

## 2020-08-13 RX ADMIN — TRIMETHOPRIM 100 MG: 100 TABLET ORAL at 21:33

## 2020-08-13 RX ADMIN — GABAPENTIN 300 MG: 300 CAPSULE ORAL at 10:18

## 2020-08-13 RX ADMIN — ONDANSETRON 4 MG: 2 INJECTION INTRAMUSCULAR; INTRAVENOUS at 00:15

## 2020-08-13 RX ADMIN — CETIRIZINE HYDROCHLORIDE 10 MG: 10 TABLET, FILM COATED ORAL at 10:19

## 2020-08-13 RX ADMIN — FAMOTIDINE 20 MG: 20 TABLET, FILM COATED ORAL at 10:19

## 2020-08-13 RX ADMIN — LORAZEPAM 0.5 MG: 2 INJECTION INTRAMUSCULAR; INTRAVENOUS at 14:36

## 2020-08-13 RX ADMIN — HYDROMORPHONE HYDROCHLORIDE 0.5 MG: 1 INJECTION, SOLUTION INTRAMUSCULAR; INTRAVENOUS; SUBCUTANEOUS at 05:28

## 2020-08-13 RX ADMIN — PROMETHAZINE HYDROCHLORIDE 12.5 MG: 25 INJECTION INTRAMUSCULAR; INTRAVENOUS at 13:00

## 2020-08-13 RX ADMIN — HYDROMORPHONE HYDROCHLORIDE 0.5 MG: 1 INJECTION, SOLUTION INTRAMUSCULAR; INTRAVENOUS; SUBCUTANEOUS at 00:15

## 2020-08-13 RX ADMIN — ONDANSETRON 4 MG: 2 INJECTION INTRAMUSCULAR; INTRAVENOUS at 08:41

## 2020-08-13 RX ADMIN — HYDROMORPHONE HYDROCHLORIDE 0.5 MG: 1 INJECTION, SOLUTION INTRAMUSCULAR; INTRAVENOUS; SUBCUTANEOUS at 21:57

## 2020-08-13 RX ADMIN — GABAPENTIN 300 MG: 300 CAPSULE ORAL at 20:38

## 2020-08-13 RX ADMIN — TRIMETHOPRIM 100 MG: 100 TABLET ORAL at 10:19

## 2020-08-13 RX ADMIN — PROMETHAZINE HYDROCHLORIDE 12.5 MG: 25 INJECTION INTRAMUSCULAR; INTRAVENOUS at 03:12

## 2020-08-13 RX ADMIN — OXYCODONE 10 MG: 5 TABLET ORAL at 10:18

## 2020-08-13 RX ADMIN — ROSUVASTATIN CALCIUM 20 MG: 20 TABLET, COATED ORAL at 10:19

## 2020-08-13 RX ADMIN — FLUOXETINE HYDROCHLORIDE 40 MG: 20 CAPSULE ORAL at 10:18

## 2020-08-13 RX ADMIN — LORAZEPAM 0.5 MG: 2 INJECTION INTRAMUSCULAR; INTRAVENOUS at 23:32

## 2020-08-13 RX ADMIN — METOPROLOL TARTRATE 25 MG: 25 TABLET, FILM COATED ORAL at 20:39

## 2020-08-13 RX ADMIN — METOPROLOL TARTRATE 25 MG: 25 TABLET, FILM COATED ORAL at 10:18

## 2020-08-13 RX ADMIN — ONDANSETRON HYDROCHLORIDE 4 MG: 4 TABLET, FILM COATED ORAL at 20:39

## 2020-08-13 RX ADMIN — ACETAMINOPHEN 650 MG: 325 TABLET, FILM COATED ORAL at 20:38

## 2020-08-14 LAB
ANION GAP SERPL CALCULATED.3IONS-SCNC: 9 MMOL/L (ref 5–15)
BUN SERPL-MCNC: 12 MG/DL (ref 8–23)
BUN/CREAT SERPL: 27.9 (ref 7–25)
CALCIUM SPEC-SCNC: 8 MG/DL (ref 8.6–10.5)
CHLORIDE SERPL-SCNC: 99 MMOL/L (ref 98–107)
CO2 SERPL-SCNC: 24 MMOL/L (ref 22–29)
CREAT SERPL-MCNC: 0.43 MG/DL (ref 0.57–1)
DEPRECATED RDW RBC AUTO: 47.7 FL (ref 37–54)
ERYTHROCYTE [DISTWIDTH] IN BLOOD BY AUTOMATED COUNT: 16.8 % (ref 12.3–15.4)
GFR SERPL CREATININE-BSD FRML MDRD: 146 ML/MIN/1.73
GLUCOSE SERPL-MCNC: 147 MG/DL (ref 65–99)
HCT VFR BLD AUTO: 30.1 % (ref 34–46.6)
HGB BLD-MCNC: 9.4 G/DL (ref 12–15.9)
MAGNESIUM SERPL-MCNC: 1.9 MG/DL (ref 1.6–2.4)
MCH RBC QN AUTO: 24.8 PG (ref 26.6–33)
MCHC RBC AUTO-ENTMCNC: 31.2 G/DL (ref 31.5–35.7)
MCV RBC AUTO: 79.4 FL (ref 79–97)
PLATELET # BLD AUTO: 398 10*3/MM3 (ref 140–450)
PMV BLD AUTO: 9.6 FL (ref 6–12)
POTASSIUM SERPL-SCNC: 3.8 MMOL/L (ref 3.5–5.2)
RBC # BLD AUTO: 3.79 10*6/MM3 (ref 3.77–5.28)
SODIUM SERPL-SCNC: 132 MMOL/L (ref 136–145)
WBC # BLD AUTO: 9.06 10*3/MM3 (ref 3.4–10.8)

## 2020-08-14 PROCEDURE — 25010000002 ONDANSETRON PER 1 MG: Performed by: STUDENT IN AN ORGANIZED HEALTH CARE EDUCATION/TRAINING PROGRAM

## 2020-08-14 PROCEDURE — 83735 ASSAY OF MAGNESIUM: CPT | Performed by: OBSTETRICS & GYNECOLOGY

## 2020-08-14 PROCEDURE — 85027 COMPLETE CBC AUTOMATED: CPT | Performed by: STUDENT IN AN ORGANIZED HEALTH CARE EDUCATION/TRAINING PROGRAM

## 2020-08-14 PROCEDURE — 97110 THERAPEUTIC EXERCISES: CPT

## 2020-08-14 PROCEDURE — 97530 THERAPEUTIC ACTIVITIES: CPT

## 2020-08-14 PROCEDURE — 25010000002 HYDROMORPHONE PER 4 MG: Performed by: STUDENT IN AN ORGANIZED HEALTH CARE EDUCATION/TRAINING PROGRAM

## 2020-08-14 PROCEDURE — 25010000002 LORAZEPAM PER 2 MG: Performed by: OBSTETRICS & GYNECOLOGY

## 2020-08-14 PROCEDURE — 25010000002 ENOXAPARIN PER 10 MG: Performed by: STUDENT IN AN ORGANIZED HEALTH CARE EDUCATION/TRAINING PROGRAM

## 2020-08-14 PROCEDURE — 80048 BASIC METABOLIC PNL TOTAL CA: CPT | Performed by: STUDENT IN AN ORGANIZED HEALTH CARE EDUCATION/TRAINING PROGRAM

## 2020-08-14 PROCEDURE — 99221 1ST HOSP IP/OBS SF/LOW 40: CPT | Performed by: INTERNAL MEDICINE

## 2020-08-14 RX ORDER — BISACODYL 10 MG
10 SUPPOSITORY, RECTAL RECTAL DAILY PRN
Status: DISCONTINUED | OUTPATIENT
Start: 2020-08-14 | End: 2020-08-25

## 2020-08-14 RX ORDER — PYRIDOSTIGMINE BROMIDE 60 MG/1
60 TABLET ORAL EVERY 8 HOURS SCHEDULED
Status: DISCONTINUED | OUTPATIENT
Start: 2020-08-14 | End: 2020-08-14

## 2020-08-14 RX ORDER — PYRIDOSTIGMINE BROMIDE 60 MG/1
30 TABLET ORAL EVERY 8 HOURS SCHEDULED
Status: DISCONTINUED | OUTPATIENT
Start: 2020-08-14 | End: 2020-08-25

## 2020-08-14 RX ORDER — DIPHENHYDRAMINE HYDROCHLORIDE 50 MG/ML
25 INJECTION INTRAMUSCULAR; INTRAVENOUS EVERY 6 HOURS PRN
Status: DISCONTINUED | OUTPATIENT
Start: 2020-08-14 | End: 2020-08-28 | Stop reason: HOSPADM

## 2020-08-14 RX ORDER — PANTOPRAZOLE SODIUM 40 MG/10ML
40 INJECTION, POWDER, LYOPHILIZED, FOR SOLUTION INTRAVENOUS
Status: DISCONTINUED | OUTPATIENT
Start: 2020-08-14 | End: 2020-08-21

## 2020-08-14 RX ORDER — L.ACID,PARA/B.BIFIDUM/S.THERM 8B CELL
1 CAPSULE ORAL DAILY
Status: DISCONTINUED | OUTPATIENT
Start: 2020-08-14 | End: 2020-08-28

## 2020-08-14 RX ORDER — BISACODYL 10 MG
10 SUPPOSITORY, RECTAL RECTAL DAILY
Status: DISCONTINUED | OUTPATIENT
Start: 2020-08-14 | End: 2020-08-14

## 2020-08-14 RX ADMIN — GABAPENTIN 300 MG: 300 CAPSULE ORAL at 17:04

## 2020-08-14 RX ADMIN — PANTOPRAZOLE SODIUM 40 MG: 40 INJECTION, POWDER, FOR SOLUTION INTRAVENOUS at 17:04

## 2020-08-14 RX ADMIN — ENOXAPARIN SODIUM 40 MG: 40 INJECTION SUBCUTANEOUS at 11:10

## 2020-08-14 RX ADMIN — ACETAMINOPHEN 650 MG: 325 TABLET, FILM COATED ORAL at 11:08

## 2020-08-14 RX ADMIN — HYDROMORPHONE HYDROCHLORIDE 0.5 MG: 1 INJECTION, SOLUTION INTRAMUSCULAR; INTRAVENOUS; SUBCUTANEOUS at 16:00

## 2020-08-14 RX ADMIN — PYRIDOSTIGMINE BROMIDE 30 MG: 60 TABLET ORAL at 21:18

## 2020-08-14 RX ADMIN — GABAPENTIN 300 MG: 300 CAPSULE ORAL at 21:18

## 2020-08-14 RX ADMIN — BISACODYL 10 MG: 10 SUPPOSITORY RECTAL at 09:16

## 2020-08-14 RX ADMIN — ONDANSETRON 4 MG: 2 INJECTION INTRAMUSCULAR; INTRAVENOUS at 05:32

## 2020-08-14 RX ADMIN — TRIMETHOPRIM 100 MG: 100 TABLET ORAL at 21:18

## 2020-08-14 RX ADMIN — ACETAMINOPHEN 650 MG: 325 TABLET, FILM COATED ORAL at 17:04

## 2020-08-14 RX ADMIN — PANTOPRAZOLE SODIUM 40 MG: 40 INJECTION, POWDER, FOR SOLUTION INTRAVENOUS at 09:16

## 2020-08-14 RX ADMIN — SODIUM CHLORIDE, POTASSIUM CHLORIDE, SODIUM LACTATE AND CALCIUM CHLORIDE 125 ML/HR: 600; 310; 30; 20 INJECTION, SOLUTION INTRAVENOUS at 04:04

## 2020-08-14 RX ADMIN — GABAPENTIN 300 MG: 300 CAPSULE ORAL at 11:08

## 2020-08-14 RX ADMIN — TRIMETHOPRIM 100 MG: 100 TABLET ORAL at 11:09

## 2020-08-14 RX ADMIN — SODIUM CHLORIDE, POTASSIUM CHLORIDE, SODIUM LACTATE AND CALCIUM CHLORIDE 1000 ML: 600; 310; 30; 20 INJECTION, SOLUTION INTRAVENOUS at 05:24

## 2020-08-14 RX ADMIN — LORAZEPAM 0.5 MG: 2 INJECTION INTRAMUSCULAR; INTRAVENOUS at 07:46

## 2020-08-14 RX ADMIN — METOPROLOL TARTRATE 25 MG: 25 TABLET, FILM COATED ORAL at 11:09

## 2020-08-14 RX ADMIN — PYRIDOSTIGMINE BROMIDE 30 MG: 60 TABLET ORAL at 14:26

## 2020-08-14 RX ADMIN — PANTOPRAZOLE SODIUM 40 MG: 40 TABLET, DELAYED RELEASE ORAL at 05:32

## 2020-08-15 ENCOUNTER — APPOINTMENT (OUTPATIENT)
Dept: GENERAL RADIOLOGY | Facility: HOSPITAL | Age: 70
End: 2020-08-15

## 2020-08-15 LAB
ABO GROUP BLD: NORMAL
ANION GAP SERPL CALCULATED.3IONS-SCNC: 7 MMOL/L (ref 5–15)
BH BB BLOOD EXPIRATION DATE: NORMAL
BH BB BLOOD EXPIRATION DATE: NORMAL
BH BB BLOOD TYPE BARCODE: 5100
BH BB BLOOD TYPE BARCODE: 5100
BH BB DISPENSE STATUS: NORMAL
BH BB DISPENSE STATUS: NORMAL
BH BB PRODUCT CODE: NORMAL
BH BB PRODUCT CODE: NORMAL
BH BB UNIT NUMBER: NORMAL
BH BB UNIT NUMBER: NORMAL
BLD GP AB SCN SERPL QL: NEGATIVE
BUN SERPL-MCNC: 10 MG/DL (ref 8–23)
BUN/CREAT SERPL: 23.3 (ref 7–25)
CA-I SERPL ISE-MCNC: 1.21 MMOL/L (ref 1.12–1.32)
CALCIUM SPEC-SCNC: 7.7 MG/DL (ref 8.6–10.5)
CHLORIDE SERPL-SCNC: 101 MMOL/L (ref 98–107)
CO2 SERPL-SCNC: 26 MMOL/L (ref 22–29)
CREAT SERPL-MCNC: 0.43 MG/DL (ref 0.57–1)
CROSSMATCH INTERPRETATION: NORMAL
CROSSMATCH INTERPRETATION: NORMAL
DEPRECATED RDW RBC AUTO: 49.1 FL (ref 37–54)
ERYTHROCYTE [DISTWIDTH] IN BLOOD BY AUTOMATED COUNT: 17.1 % (ref 12.3–15.4)
GFR SERPL CREATININE-BSD FRML MDRD: 146 ML/MIN/1.73
GLUCOSE SERPL-MCNC: 107 MG/DL (ref 65–99)
HCT VFR BLD AUTO: 24.5 % (ref 34–46.6)
HGB BLD-MCNC: 7.7 G/DL (ref 12–15.9)
MAGNESIUM SERPL-MCNC: 1.9 MG/DL (ref 1.6–2.4)
MCH RBC QN AUTO: 25.8 PG (ref 26.6–33)
MCHC RBC AUTO-ENTMCNC: 31.4 G/DL (ref 31.5–35.7)
MCV RBC AUTO: 81.9 FL (ref 79–97)
PHOSPHATE SERPL-MCNC: 2.2 MG/DL (ref 2.5–4.5)
PLATELET # BLD AUTO: 326 10*3/MM3 (ref 140–450)
PMV BLD AUTO: 9.6 FL (ref 6–12)
POTASSIUM SERPL-SCNC: 3.5 MMOL/L (ref 3.5–5.2)
RBC # BLD AUTO: 2.99 10*6/MM3 (ref 3.77–5.28)
RH BLD: POSITIVE
SODIUM SERPL-SCNC: 134 MMOL/L (ref 136–145)
T&S EXPIRATION DATE: NORMAL
UNIT  ABO: NORMAL
UNIT  ABO: NORMAL
UNIT  RH: NORMAL
UNIT  RH: NORMAL
WBC # BLD AUTO: 4.9 10*3/MM3 (ref 3.4–10.8)

## 2020-08-15 PROCEDURE — 86900 BLOOD TYPING SEROLOGIC ABO: CPT

## 2020-08-15 PROCEDURE — 86923 COMPATIBILITY TEST ELECTRIC: CPT

## 2020-08-15 PROCEDURE — 99232 SBSQ HOSP IP/OBS MODERATE 35: CPT | Performed by: INTERNAL MEDICINE

## 2020-08-15 PROCEDURE — P9016 RBC LEUKOCYTES REDUCED: HCPCS

## 2020-08-15 PROCEDURE — 83735 ASSAY OF MAGNESIUM: CPT | Performed by: OBSTETRICS & GYNECOLOGY

## 2020-08-15 PROCEDURE — 82330 ASSAY OF CALCIUM: CPT | Performed by: OBSTETRICS & GYNECOLOGY

## 2020-08-15 PROCEDURE — 74022 RADEX COMPL AQT ABD SERIES: CPT

## 2020-08-15 PROCEDURE — 86901 BLOOD TYPING SEROLOGIC RH(D): CPT | Performed by: OBSTETRICS & GYNECOLOGY

## 2020-08-15 PROCEDURE — 25010000002 ENOXAPARIN PER 10 MG: Performed by: STUDENT IN AN ORGANIZED HEALTH CARE EDUCATION/TRAINING PROGRAM

## 2020-08-15 PROCEDURE — 80048 BASIC METABOLIC PNL TOTAL CA: CPT | Performed by: STUDENT IN AN ORGANIZED HEALTH CARE EDUCATION/TRAINING PROGRAM

## 2020-08-15 PROCEDURE — 25010000002 PROMETHAZINE PER 50 MG: Performed by: STUDENT IN AN ORGANIZED HEALTH CARE EDUCATION/TRAINING PROGRAM

## 2020-08-15 PROCEDURE — 36430 TRANSFUSION BLD/BLD COMPNT: CPT

## 2020-08-15 PROCEDURE — 25010000002 ONDANSETRON PER 1 MG: Performed by: STUDENT IN AN ORGANIZED HEALTH CARE EDUCATION/TRAINING PROGRAM

## 2020-08-15 PROCEDURE — 25010000002 HYDROMORPHONE PER 4 MG: Performed by: STUDENT IN AN ORGANIZED HEALTH CARE EDUCATION/TRAINING PROGRAM

## 2020-08-15 PROCEDURE — 86900 BLOOD TYPING SEROLOGIC ABO: CPT | Performed by: OBSTETRICS & GYNECOLOGY

## 2020-08-15 PROCEDURE — 25010000002 LORAZEPAM PER 2 MG: Performed by: OBSTETRICS & GYNECOLOGY

## 2020-08-15 PROCEDURE — 85027 COMPLETE CBC AUTOMATED: CPT | Performed by: STUDENT IN AN ORGANIZED HEALTH CARE EDUCATION/TRAINING PROGRAM

## 2020-08-15 PROCEDURE — 84100 ASSAY OF PHOSPHORUS: CPT | Performed by: OBSTETRICS & GYNECOLOGY

## 2020-08-15 PROCEDURE — 86850 RBC ANTIBODY SCREEN: CPT | Performed by: OBSTETRICS & GYNECOLOGY

## 2020-08-15 RX ORDER — ALUMINA, MAGNESIA, AND SIMETHICONE 2400; 2400; 240 MG/30ML; MG/30ML; MG/30ML
15 SUSPENSION ORAL ONCE
Status: COMPLETED | OUTPATIENT
Start: 2020-08-15 | End: 2020-08-15

## 2020-08-15 RX ORDER — ACETAMINOPHEN 325 MG/1
650 TABLET ORAL EVERY 6 HOURS SCHEDULED
Status: DISCONTINUED | OUTPATIENT
Start: 2020-08-15 | End: 2020-08-16

## 2020-08-15 RX ORDER — POTASSIUM CHLORIDE 1.5 G/1.77G
40 POWDER, FOR SOLUTION ORAL AS NEEDED
Status: DISCONTINUED | OUTPATIENT
Start: 2020-08-15 | End: 2020-08-28 | Stop reason: HOSPADM

## 2020-08-15 RX ORDER — ALUMINA, MAGNESIA, AND SIMETHICONE 2400; 2400; 240 MG/30ML; MG/30ML; MG/30ML
15 SUSPENSION ORAL ONCE
Status: DISCONTINUED | OUTPATIENT
Start: 2020-08-15 | End: 2020-08-16

## 2020-08-15 RX ORDER — ACETAMINOPHEN 160 MG/5ML
650 SOLUTION ORAL EVERY 6 HOURS SCHEDULED
Status: DISCONTINUED | OUTPATIENT
Start: 2020-08-15 | End: 2020-08-21

## 2020-08-15 RX ORDER — POTASSIUM CHLORIDE 750 MG/1
40 CAPSULE, EXTENDED RELEASE ORAL AS NEEDED
Status: DISCONTINUED | OUTPATIENT
Start: 2020-08-15 | End: 2020-08-16

## 2020-08-15 RX ADMIN — PANTOPRAZOLE SODIUM 40 MG: 40 INJECTION, POWDER, FOR SOLUTION INTRAVENOUS at 16:47

## 2020-08-15 RX ADMIN — GABAPENTIN 300 MG: 300 CAPSULE ORAL at 11:49

## 2020-08-15 RX ADMIN — SODIUM CHLORIDE, POTASSIUM CHLORIDE, SODIUM LACTATE AND CALCIUM CHLORIDE 125 ML/HR: 600; 310; 30; 20 INJECTION, SOLUTION INTRAVENOUS at 06:28

## 2020-08-15 RX ADMIN — ONDANSETRON 4 MG: 2 INJECTION INTRAMUSCULAR; INTRAVENOUS at 14:48

## 2020-08-15 RX ADMIN — GABAPENTIN 300 MG: 300 CAPSULE ORAL at 21:33

## 2020-08-15 RX ADMIN — PROMETHAZINE HYDROCHLORIDE 12.5 MG: 25 INJECTION INTRAMUSCULAR; INTRAVENOUS at 17:05

## 2020-08-15 RX ADMIN — POTASSIUM CHLORIDE 40 MEQ: 10 CAPSULE, COATED, EXTENDED RELEASE ORAL at 21:33

## 2020-08-15 RX ADMIN — ONDANSETRON 4 MG: 2 INJECTION INTRAMUSCULAR; INTRAVENOUS at 21:31

## 2020-08-15 RX ADMIN — Medication 1 CAPSULE: at 08:15

## 2020-08-15 RX ADMIN — ACETAMINOPHEN ORAL SOLUTION 650 MG: 650 SOLUTION ORAL at 11:49

## 2020-08-15 RX ADMIN — METOPROLOL TARTRATE 25 MG: 25 TABLET, FILM COATED ORAL at 21:33

## 2020-08-15 RX ADMIN — PANTOPRAZOLE SODIUM 40 MG: 40 INJECTION, POWDER, FOR SOLUTION INTRAVENOUS at 08:15

## 2020-08-15 RX ADMIN — TRIMETHOPRIM 100 MG: 100 TABLET ORAL at 08:21

## 2020-08-15 RX ADMIN — HYDROMORPHONE HYDROCHLORIDE 0.5 MG: 1 INJECTION, SOLUTION INTRAMUSCULAR; INTRAVENOUS; SUBCUTANEOUS at 08:22

## 2020-08-15 RX ADMIN — LORAZEPAM 0.5 MG: 2 INJECTION INTRAMUSCULAR; INTRAVENOUS at 20:10

## 2020-08-15 RX ADMIN — TRIMETHOPRIM 100 MG: 100 TABLET ORAL at 21:31

## 2020-08-15 RX ADMIN — ENOXAPARIN SODIUM 40 MG: 40 INJECTION SUBCUTANEOUS at 08:15

## 2020-08-15 RX ADMIN — GABAPENTIN 300 MG: 300 CAPSULE ORAL at 16:47

## 2020-08-15 RX ADMIN — PYRIDOSTIGMINE BROMIDE 30 MG: 60 TABLET ORAL at 14:03

## 2020-08-15 RX ADMIN — SODIUM CHLORIDE, POTASSIUM CHLORIDE, SODIUM LACTATE AND CALCIUM CHLORIDE 125 ML/HR: 600; 310; 30; 20 INJECTION, SOLUTION INTRAVENOUS at 14:42

## 2020-08-15 RX ADMIN — CETIRIZINE HYDROCHLORIDE 10 MG: 10 TABLET, FILM COATED ORAL at 08:15

## 2020-08-15 RX ADMIN — PYRIDOSTIGMINE BROMIDE 30 MG: 60 TABLET ORAL at 06:27

## 2020-08-15 RX ADMIN — GABAPENTIN 300 MG: 300 CAPSULE ORAL at 08:14

## 2020-08-15 RX ADMIN — ONDANSETRON 4 MG: 2 INJECTION INTRAMUSCULAR; INTRAVENOUS at 08:41

## 2020-08-15 RX ADMIN — ACETAMINOPHEN ORAL SOLUTION 649.6 MG: 650 SOLUTION ORAL at 16:55

## 2020-08-15 RX ADMIN — TEMAZEPAM 7.5 MG: 7.5 CAPSULE ORAL at 21:33

## 2020-08-15 RX ADMIN — ACETAMINOPHEN ORAL SOLUTION 650 MG: 650 SOLUTION ORAL at 06:27

## 2020-08-15 RX ADMIN — ROSUVASTATIN CALCIUM 20 MG: 20 TABLET, COATED ORAL at 08:14

## 2020-08-15 RX ADMIN — ACETAMINOPHEN ORAL SOLUTION 650 MG: 650 SOLUTION ORAL at 00:09

## 2020-08-15 RX ADMIN — PYRIDOSTIGMINE BROMIDE 30 MG: 60 TABLET ORAL at 21:32

## 2020-08-15 RX ADMIN — ALUMINUM HYDROXIDE, MAGNESIUM HYDROXIDE, AND DIMETHICONE 15 ML: 400; 400; 40 SUSPENSION ORAL at 15:34

## 2020-08-15 RX ADMIN — FLUOXETINE HYDROCHLORIDE 40 MG: 20 CAPSULE ORAL at 08:15

## 2020-08-15 RX ADMIN — SODIUM CHLORIDE, POTASSIUM CHLORIDE, SODIUM LACTATE AND CALCIUM CHLORIDE 1000 ML: 600; 310; 30; 20 INJECTION, SOLUTION INTRAVENOUS at 00:52

## 2020-08-15 RX ADMIN — POTASSIUM CHLORIDE 40 MEQ: 10 CAPSULE, COATED, EXTENDED RELEASE ORAL at 14:49

## 2020-08-16 LAB
ANION GAP SERPL CALCULATED.3IONS-SCNC: 8 MMOL/L (ref 5–15)
BH BB BLOOD EXPIRATION DATE: NORMAL
BH BB BLOOD TYPE BARCODE: 5100
BH BB DISPENSE STATUS: NORMAL
BH BB PRODUCT CODE: NORMAL
BH BB UNIT NUMBER: NORMAL
BUN SERPL-MCNC: 6 MG/DL (ref 8–23)
BUN/CREAT SERPL: 14.3 (ref 7–25)
CALCIUM SPEC-SCNC: 8.2 MG/DL (ref 8.6–10.5)
CHLORIDE SERPL-SCNC: 96 MMOL/L (ref 98–107)
CO2 SERPL-SCNC: 26 MMOL/L (ref 22–29)
CREAT SERPL-MCNC: 0.42 MG/DL (ref 0.57–1)
CROSSMATCH INTERPRETATION: NORMAL
DEPRECATED RDW RBC AUTO: 50.4 FL (ref 37–54)
ERYTHROCYTE [DISTWIDTH] IN BLOOD BY AUTOMATED COUNT: 17.2 % (ref 12.3–15.4)
GFR SERPL CREATININE-BSD FRML MDRD: 150 ML/MIN/1.73
GLUCOSE SERPL-MCNC: 98 MG/DL (ref 65–99)
HCT VFR BLD AUTO: 29.1 % (ref 34–46.6)
HGB BLD-MCNC: 9.3 G/DL (ref 12–15.9)
MCH RBC QN AUTO: 26.4 PG (ref 26.6–33)
MCHC RBC AUTO-ENTMCNC: 32 G/DL (ref 31.5–35.7)
MCV RBC AUTO: 82.7 FL (ref 79–97)
PLATELET # BLD AUTO: 336 10*3/MM3 (ref 140–450)
PMV BLD AUTO: 9.7 FL (ref 6–12)
POTASSIUM SERPL-SCNC: 3.8 MMOL/L (ref 3.5–5.2)
RBC # BLD AUTO: 3.52 10*6/MM3 (ref 3.77–5.28)
SODIUM SERPL-SCNC: 130 MMOL/L (ref 136–145)
UNIT  ABO: NORMAL
UNIT  RH: NORMAL
WBC # BLD AUTO: 5.65 10*3/MM3 (ref 3.4–10.8)

## 2020-08-16 PROCEDURE — 97535 SELF CARE MNGMENT TRAINING: CPT

## 2020-08-16 PROCEDURE — 25010000002 PROMETHAZINE PER 50 MG: Performed by: STUDENT IN AN ORGANIZED HEALTH CARE EDUCATION/TRAINING PROGRAM

## 2020-08-16 PROCEDURE — 97530 THERAPEUTIC ACTIVITIES: CPT

## 2020-08-16 PROCEDURE — 99231 SBSQ HOSP IP/OBS SF/LOW 25: CPT | Performed by: INTERNAL MEDICINE

## 2020-08-16 PROCEDURE — 85027 COMPLETE CBC AUTOMATED: CPT | Performed by: STUDENT IN AN ORGANIZED HEALTH CARE EDUCATION/TRAINING PROGRAM

## 2020-08-16 PROCEDURE — 25010000002 ENOXAPARIN PER 10 MG: Performed by: OBSTETRICS & GYNECOLOGY

## 2020-08-16 PROCEDURE — 80048 BASIC METABOLIC PNL TOTAL CA: CPT | Performed by: STUDENT IN AN ORGANIZED HEALTH CARE EDUCATION/TRAINING PROGRAM

## 2020-08-16 PROCEDURE — 25010000002 LORAZEPAM PER 2 MG: Performed by: OBSTETRICS & GYNECOLOGY

## 2020-08-16 PROCEDURE — 25010000002 HYDROMORPHONE PER 4 MG: Performed by: STUDENT IN AN ORGANIZED HEALTH CARE EDUCATION/TRAINING PROGRAM

## 2020-08-16 RX ORDER — GABAPENTIN 250 MG/5ML
300 SOLUTION ORAL
Status: DISCONTINUED | OUTPATIENT
Start: 2020-08-16 | End: 2020-08-21

## 2020-08-16 RX ORDER — GABAPENTIN 300 MG/1
300 CAPSULE ORAL
Status: DISCONTINUED | OUTPATIENT
Start: 2020-08-16 | End: 2020-08-16 | Stop reason: SDUPTHER

## 2020-08-16 RX ADMIN — GABAPENTIN 300 MG: 250 SOLUTION ORAL at 12:52

## 2020-08-16 RX ADMIN — PYRIDOSTIGMINE BROMIDE 30 MG: 60 TABLET ORAL at 15:00

## 2020-08-16 RX ADMIN — PYRIDOSTIGMINE BROMIDE 30 MG: 60 TABLET ORAL at 06:23

## 2020-08-16 RX ADMIN — SODIUM CHLORIDE, POTASSIUM CHLORIDE, SODIUM LACTATE AND CALCIUM CHLORIDE 125 ML/HR: 600; 310; 30; 20 INJECTION, SOLUTION INTRAVENOUS at 08:54

## 2020-08-16 RX ADMIN — FLUOXETINE HYDROCHLORIDE 40 MG: 20 CAPSULE ORAL at 08:55

## 2020-08-16 RX ADMIN — PROMETHAZINE HYDROCHLORIDE 12.5 MG: 25 INJECTION INTRAMUSCULAR; INTRAVENOUS at 01:48

## 2020-08-16 RX ADMIN — ACETAMINOPHEN ORAL SOLUTION 650 MG: 650 SOLUTION ORAL at 06:23

## 2020-08-16 RX ADMIN — ACETAMINOPHEN ORAL SOLUTION 650 MG: 650 SOLUTION ORAL at 17:13

## 2020-08-16 RX ADMIN — ACETAMINOPHEN ORAL SOLUTION 650 MG: 650 SOLUTION ORAL at 23:47

## 2020-08-16 RX ADMIN — GABAPENTIN 300 MG: 250 SOLUTION ORAL at 21:21

## 2020-08-16 RX ADMIN — LORAZEPAM 0.5 MG: 2 INJECTION INTRAMUSCULAR; INTRAVENOUS at 06:23

## 2020-08-16 RX ADMIN — HYDROMORPHONE HYDROCHLORIDE 0.5 MG: 1 INJECTION, SOLUTION INTRAMUSCULAR; INTRAVENOUS; SUBCUTANEOUS at 11:25

## 2020-08-16 RX ADMIN — BISACODYL 10 MG: 10 SUPPOSITORY RECTAL at 08:55

## 2020-08-16 RX ADMIN — PANTOPRAZOLE SODIUM 40 MG: 40 INJECTION, POWDER, FOR SOLUTION INTRAVENOUS at 08:56

## 2020-08-16 RX ADMIN — PANTOPRAZOLE SODIUM 40 MG: 40 INJECTION, POWDER, FOR SOLUTION INTRAVENOUS at 17:12

## 2020-08-16 RX ADMIN — ENOXAPARIN SODIUM 40 MG: 40 INJECTION SUBCUTANEOUS at 01:48

## 2020-08-16 RX ADMIN — ACETAMINOPHEN ORAL SOLUTION 650 MG: 650 SOLUTION ORAL at 12:52

## 2020-08-16 RX ADMIN — HYDROMORPHONE HYDROCHLORIDE 0.5 MG: 1 INJECTION, SOLUTION INTRAMUSCULAR; INTRAVENOUS; SUBCUTANEOUS at 21:28

## 2020-08-16 RX ADMIN — METOPROLOL TARTRATE 25 MG: 25 TABLET, FILM COATED ORAL at 08:55

## 2020-08-16 RX ADMIN — HYDROMORPHONE HYDROCHLORIDE 0.5 MG: 1 INJECTION, SOLUTION INTRAMUSCULAR; INTRAVENOUS; SUBCUTANEOUS at 08:54

## 2020-08-16 RX ADMIN — GABAPENTIN 300 MG: 250 SOLUTION ORAL at 17:13

## 2020-08-16 RX ADMIN — Medication 1 CAPSULE: at 08:55

## 2020-08-16 RX ADMIN — ENOXAPARIN SODIUM 40 MG: 40 INJECTION SUBCUTANEOUS at 23:47

## 2020-08-16 RX ADMIN — PYRIDOSTIGMINE BROMIDE 30 MG: 60 TABLET ORAL at 21:22

## 2020-08-17 LAB
ANION GAP SERPL CALCULATED.3IONS-SCNC: 9 MMOL/L (ref 5–15)
BUN SERPL-MCNC: 5 MG/DL (ref 8–23)
BUN/CREAT SERPL: 12.5 (ref 7–25)
CALCIUM SPEC-SCNC: 7.8 MG/DL (ref 8.6–10.5)
CHLORIDE SERPL-SCNC: 98 MMOL/L (ref 98–107)
CO2 SERPL-SCNC: 26 MMOL/L (ref 22–29)
CREAT SERPL-MCNC: 0.4 MG/DL (ref 0.57–1)
DEPRECATED RDW RBC AUTO: 50.1 FL (ref 37–54)
ERYTHROCYTE [DISTWIDTH] IN BLOOD BY AUTOMATED COUNT: 17 % (ref 12.3–15.4)
GFR SERPL CREATININE-BSD FRML MDRD: >150 ML/MIN/1.73
GLUCOSE SERPL-MCNC: 106 MG/DL (ref 65–99)
HCT VFR BLD AUTO: 28.1 % (ref 34–46.6)
HGB BLD-MCNC: 9.1 G/DL (ref 12–15.9)
MCH RBC QN AUTO: 26.3 PG (ref 26.6–33)
MCHC RBC AUTO-ENTMCNC: 32.4 G/DL (ref 31.5–35.7)
MCV RBC AUTO: 81.2 FL (ref 79–97)
PLATELET # BLD AUTO: 340 10*3/MM3 (ref 140–450)
PMV BLD AUTO: 9.1 FL (ref 6–12)
POTASSIUM SERPL-SCNC: 3.5 MMOL/L (ref 3.5–5.2)
RBC # BLD AUTO: 3.46 10*6/MM3 (ref 3.77–5.28)
SODIUM SERPL-SCNC: 133 MMOL/L (ref 136–145)
WBC # BLD AUTO: 5.43 10*3/MM3 (ref 3.4–10.8)

## 2020-08-17 PROCEDURE — 99231 SBSQ HOSP IP/OBS SF/LOW 25: CPT | Performed by: INTERNAL MEDICINE

## 2020-08-17 PROCEDURE — 25010000002 LORAZEPAM PER 2 MG: Performed by: OBSTETRICS & GYNECOLOGY

## 2020-08-17 PROCEDURE — 25010000002 HYDROMORPHONE PER 4 MG: Performed by: STUDENT IN AN ORGANIZED HEALTH CARE EDUCATION/TRAINING PROGRAM

## 2020-08-17 PROCEDURE — 97530 THERAPEUTIC ACTIVITIES: CPT

## 2020-08-17 PROCEDURE — 25010000002 ENOXAPARIN PER 10 MG: Performed by: OBSTETRICS & GYNECOLOGY

## 2020-08-17 PROCEDURE — 25010000002 ONDANSETRON PER 1 MG: Performed by: STUDENT IN AN ORGANIZED HEALTH CARE EDUCATION/TRAINING PROGRAM

## 2020-08-17 PROCEDURE — 80048 BASIC METABOLIC PNL TOTAL CA: CPT | Performed by: STUDENT IN AN ORGANIZED HEALTH CARE EDUCATION/TRAINING PROGRAM

## 2020-08-17 PROCEDURE — 85027 COMPLETE CBC AUTOMATED: CPT | Performed by: STUDENT IN AN ORGANIZED HEALTH CARE EDUCATION/TRAINING PROGRAM

## 2020-08-17 RX ADMIN — PANTOPRAZOLE SODIUM 40 MG: 40 INJECTION, POWDER, FOR SOLUTION INTRAVENOUS at 08:53

## 2020-08-17 RX ADMIN — GABAPENTIN 300 MG: 250 SOLUTION ORAL at 08:53

## 2020-08-17 RX ADMIN — LORAZEPAM 0.5 MG: 2 INJECTION INTRAMUSCULAR; INTRAVENOUS at 12:32

## 2020-08-17 RX ADMIN — GABAPENTIN 300 MG: 250 SOLUTION ORAL at 22:06

## 2020-08-17 RX ADMIN — ENOXAPARIN SODIUM 40 MG: 40 INJECTION SUBCUTANEOUS at 23:14

## 2020-08-17 RX ADMIN — TOPICAL ANESTHETIC 1 SPRAY: 200 SPRAY DENTAL; PERIODONTAL at 12:12

## 2020-08-17 RX ADMIN — PYRIDOSTIGMINE BROMIDE 30 MG: 60 TABLET ORAL at 05:40

## 2020-08-17 RX ADMIN — PYRIDOSTIGMINE BROMIDE 30 MG: 60 TABLET ORAL at 15:00

## 2020-08-17 RX ADMIN — METOPROLOL TARTRATE 25 MG: 25 TABLET, FILM COATED ORAL at 08:53

## 2020-08-17 RX ADMIN — ACETAMINOPHEN ORAL SOLUTION 650 MG: 650 SOLUTION ORAL at 05:40

## 2020-08-17 RX ADMIN — PANTOPRAZOLE SODIUM 40 MG: 40 INJECTION, POWDER, FOR SOLUTION INTRAVENOUS at 17:31

## 2020-08-17 RX ADMIN — FLUOXETINE HYDROCHLORIDE 40 MG: 20 CAPSULE ORAL at 08:53

## 2020-08-17 RX ADMIN — GABAPENTIN 300 MG: 250 SOLUTION ORAL at 17:31

## 2020-08-17 RX ADMIN — ONDANSETRON 4 MG: 2 INJECTION INTRAMUSCULAR; INTRAVENOUS at 22:05

## 2020-08-17 RX ADMIN — Medication 1 CAPSULE: at 08:53

## 2020-08-17 RX ADMIN — ACETAMINOPHEN ORAL SOLUTION 650 MG: 650 SOLUTION ORAL at 17:31

## 2020-08-17 RX ADMIN — HYDROMORPHONE HYDROCHLORIDE 0.5 MG: 1 INJECTION, SOLUTION INTRAMUSCULAR; INTRAVENOUS; SUBCUTANEOUS at 10:08

## 2020-08-17 RX ADMIN — PYRIDOSTIGMINE BROMIDE 30 MG: 60 TABLET ORAL at 22:05

## 2020-08-17 RX ADMIN — GABAPENTIN 300 MG: 250 SOLUTION ORAL at 11:46

## 2020-08-17 RX ADMIN — METOPROLOL TARTRATE 25 MG: 25 TABLET, FILM COATED ORAL at 22:05

## 2020-08-17 RX ADMIN — ACETAMINOPHEN ORAL SOLUTION 650 MG: 650 SOLUTION ORAL at 23:14

## 2020-08-17 RX ADMIN — ACETAMINOPHEN ORAL SOLUTION 650 MG: 650 SOLUTION ORAL at 11:46

## 2020-08-18 ENCOUNTER — APPOINTMENT (OUTPATIENT)
Dept: CT IMAGING | Facility: HOSPITAL | Age: 70
End: 2020-08-18

## 2020-08-18 LAB
ANION GAP SERPL CALCULATED.3IONS-SCNC: 10 MMOL/L (ref 5–15)
BUN SERPL-MCNC: 4 MG/DL (ref 8–23)
BUN/CREAT SERPL: 11.8 (ref 7–25)
CALCIUM SPEC-SCNC: 7.9 MG/DL (ref 8.6–10.5)
CHLORIDE SERPL-SCNC: 97 MMOL/L (ref 98–107)
CO2 SERPL-SCNC: 28 MMOL/L (ref 22–29)
CREAT SERPL-MCNC: 0.34 MG/DL (ref 0.57–1)
DEPRECATED RDW RBC AUTO: 50.2 FL (ref 37–54)
ERYTHROCYTE [DISTWIDTH] IN BLOOD BY AUTOMATED COUNT: 17.3 % (ref 12.3–15.4)
GFR SERPL CREATININE-BSD FRML MDRD: >150 ML/MIN/1.73
GLUCOSE SERPL-MCNC: 108 MG/DL (ref 65–99)
HCT VFR BLD AUTO: 27.4 % (ref 34–46.6)
HGB BLD-MCNC: 8.7 G/DL (ref 12–15.9)
MCH RBC QN AUTO: 25.5 PG (ref 26.6–33)
MCHC RBC AUTO-ENTMCNC: 31.8 G/DL (ref 31.5–35.7)
MCV RBC AUTO: 80.4 FL (ref 79–97)
PLATELET # BLD AUTO: 345 10*3/MM3 (ref 140–450)
PMV BLD AUTO: 9.6 FL (ref 6–12)
POTASSIUM SERPL-SCNC: 3.1 MMOL/L (ref 3.5–5.2)
RBC # BLD AUTO: 3.41 10*6/MM3 (ref 3.77–5.28)
SODIUM SERPL-SCNC: 135 MMOL/L (ref 136–145)
WBC # BLD AUTO: 5.53 10*3/MM3 (ref 3.4–10.8)

## 2020-08-18 PROCEDURE — 97110 THERAPEUTIC EXERCISES: CPT

## 2020-08-18 PROCEDURE — C1751 CATH, INF, PER/CENT/MIDLINE: HCPCS

## 2020-08-18 PROCEDURE — 0 DIATRIZOATE MEGLUMINE & SODIUM PER 1 ML

## 2020-08-18 PROCEDURE — 80048 BASIC METABOLIC PNL TOTAL CA: CPT | Performed by: STUDENT IN AN ORGANIZED HEALTH CARE EDUCATION/TRAINING PROGRAM

## 2020-08-18 PROCEDURE — 74177 CT ABD & PELVIS W/CONTRAST: CPT

## 2020-08-18 PROCEDURE — 05HY33Z INSERTION OF INFUSION DEVICE INTO UPPER VEIN, PERCUTANEOUS APPROACH: ICD-10-PCS | Performed by: STUDENT IN AN ORGANIZED HEALTH CARE EDUCATION/TRAINING PROGRAM

## 2020-08-18 PROCEDURE — 85027 COMPLETE CBC AUTOMATED: CPT | Performed by: STUDENT IN AN ORGANIZED HEALTH CARE EDUCATION/TRAINING PROGRAM

## 2020-08-18 PROCEDURE — 25010000002 LORAZEPAM PER 2 MG: Performed by: OBSTETRICS & GYNECOLOGY

## 2020-08-18 PROCEDURE — 97530 THERAPEUTIC ACTIVITIES: CPT

## 2020-08-18 PROCEDURE — 25010000002 ENOXAPARIN PER 10 MG: Performed by: OBSTETRICS & GYNECOLOGY

## 2020-08-18 PROCEDURE — C1894 INTRO/SHEATH, NON-LASER: HCPCS

## 2020-08-18 PROCEDURE — 25010000002 IOPAMIDOL 61 % SOLUTION: Performed by: OBSTETRICS & GYNECOLOGY

## 2020-08-18 RX ORDER — SODIUM CHLORIDE 0.9 % (FLUSH) 0.9 %
10 SYRINGE (ML) INJECTION EVERY 12 HOURS SCHEDULED
Status: DISCONTINUED | OUTPATIENT
Start: 2020-08-18 | End: 2020-08-28 | Stop reason: HOSPADM

## 2020-08-18 RX ORDER — SODIUM CHLORIDE 0.9 % (FLUSH) 0.9 %
20 SYRINGE (ML) INJECTION AS NEEDED
Status: DISCONTINUED | OUTPATIENT
Start: 2020-08-18 | End: 2020-08-28 | Stop reason: HOSPADM

## 2020-08-18 RX ORDER — SODIUM CHLORIDE 0.9 % (FLUSH) 0.9 %
10 SYRINGE (ML) INJECTION AS NEEDED
Status: DISCONTINUED | OUTPATIENT
Start: 2020-08-18 | End: 2020-08-28 | Stop reason: HOSPADM

## 2020-08-18 RX ADMIN — PANTOPRAZOLE SODIUM 40 MG: 40 INJECTION, POWDER, FOR SOLUTION INTRAVENOUS at 18:38

## 2020-08-18 RX ADMIN — PANTOPRAZOLE SODIUM 40 MG: 40 INJECTION, POWDER, FOR SOLUTION INTRAVENOUS at 05:48

## 2020-08-18 RX ADMIN — PYRIDOSTIGMINE BROMIDE 30 MG: 60 TABLET ORAL at 14:07

## 2020-08-18 RX ADMIN — PYRIDOSTIGMINE BROMIDE 30 MG: 60 TABLET ORAL at 05:48

## 2020-08-18 RX ADMIN — PYRIDOSTIGMINE BROMIDE 30 MG: 60 TABLET ORAL at 21:28

## 2020-08-18 RX ADMIN — POTASSIUM CHLORIDE 40 MEQ: 1.5 POWDER, FOR SOLUTION ORAL at 15:39

## 2020-08-18 RX ADMIN — ACETAMINOPHEN ORAL SOLUTION 650 MG: 650 SOLUTION ORAL at 05:48

## 2020-08-18 RX ADMIN — LORAZEPAM 0.5 MG: 2 INJECTION INTRAMUSCULAR; INTRAVENOUS at 15:39

## 2020-08-18 RX ADMIN — ACETAMINOPHEN ORAL SOLUTION 650 MG: 650 SOLUTION ORAL at 23:53

## 2020-08-18 RX ADMIN — ACETAMINOPHEN ORAL SOLUTION 650 MG: 650 SOLUTION ORAL at 14:06

## 2020-08-18 RX ADMIN — SODIUM CHLORIDE, PRESERVATIVE FREE 10 ML: 5 INJECTION INTRAVENOUS at 21:28

## 2020-08-18 RX ADMIN — GABAPENTIN 300 MG: 250 SOLUTION ORAL at 21:28

## 2020-08-18 RX ADMIN — IOPAMIDOL 95 ML: 612 INJECTION, SOLUTION INTRAVENOUS at 13:31

## 2020-08-18 RX ADMIN — ACETAMINOPHEN ORAL SOLUTION 650 MG: 650 SOLUTION ORAL at 18:38

## 2020-08-18 RX ADMIN — FLUOXETINE HYDROCHLORIDE 40 MG: 20 CAPSULE ORAL at 08:19

## 2020-08-18 RX ADMIN — DIATRIZOATE MEGLUMINE AND DIATRIZOATE SODIUM 15 ML: 660; 100 LIQUID ORAL; RECTAL at 09:50

## 2020-08-18 RX ADMIN — ENOXAPARIN SODIUM 40 MG: 40 INJECTION SUBCUTANEOUS at 23:53

## 2020-08-18 RX ADMIN — GABAPENTIN 300 MG: 250 SOLUTION ORAL at 18:37

## 2020-08-18 RX ADMIN — METOPROLOL TARTRATE 25 MG: 25 TABLET, FILM COATED ORAL at 08:19

## 2020-08-18 RX ADMIN — GABAPENTIN 300 MG: 250 SOLUTION ORAL at 08:18

## 2020-08-18 RX ADMIN — GABAPENTIN 300 MG: 250 SOLUTION ORAL at 14:07

## 2020-08-18 RX ADMIN — Medication 1 CAPSULE: at 08:19

## 2020-08-19 LAB
ANION GAP SERPL CALCULATED.3IONS-SCNC: 7 MMOL/L (ref 5–15)
BUN SERPL-MCNC: 4 MG/DL (ref 8–23)
BUN/CREAT SERPL: 13.8 (ref 7–25)
CALCIUM SPEC-SCNC: 8.1 MG/DL (ref 8.6–10.5)
CHLORIDE SERPL-SCNC: 96 MMOL/L (ref 98–107)
CO2 SERPL-SCNC: 30 MMOL/L (ref 22–29)
CREAT SERPL-MCNC: 0.29 MG/DL (ref 0.57–1)
DEPRECATED RDW RBC AUTO: 50.6 FL (ref 37–54)
ERYTHROCYTE [DISTWIDTH] IN BLOOD BY AUTOMATED COUNT: 17.3 % (ref 12.3–15.4)
GFR SERPL CREATININE-BSD FRML MDRD: >150 ML/MIN/1.73
GLUCOSE BLDC GLUCOMTR-MCNC: 111 MG/DL (ref 70–130)
GLUCOSE BLDC GLUCOMTR-MCNC: 129 MG/DL (ref 70–130)
GLUCOSE SERPL-MCNC: 112 MG/DL (ref 65–99)
HCT VFR BLD AUTO: 28 % (ref 34–46.6)
HGB BLD-MCNC: 8.9 G/DL (ref 12–15.9)
MAGNESIUM SERPL-MCNC: 1.7 MG/DL (ref 1.6–2.4)
MCH RBC QN AUTO: 25.6 PG (ref 26.6–33)
MCHC RBC AUTO-ENTMCNC: 31.8 G/DL (ref 31.5–35.7)
MCV RBC AUTO: 80.5 FL (ref 79–97)
PLATELET # BLD AUTO: 383 10*3/MM3 (ref 140–450)
PMV BLD AUTO: 10.1 FL (ref 6–12)
POTASSIUM SERPL-SCNC: 3 MMOL/L (ref 3.5–5.2)
RBC # BLD AUTO: 3.48 10*6/MM3 (ref 3.77–5.28)
SODIUM SERPL-SCNC: 133 MMOL/L (ref 136–145)
WBC # BLD AUTO: 5.27 10*3/MM3 (ref 3.4–10.8)

## 2020-08-19 PROCEDURE — 97110 THERAPEUTIC EXERCISES: CPT | Performed by: OCCUPATIONAL THERAPIST

## 2020-08-19 PROCEDURE — 97535 SELF CARE MNGMENT TRAINING: CPT | Performed by: OCCUPATIONAL THERAPIST

## 2020-08-19 PROCEDURE — 99024 POSTOP FOLLOW-UP VISIT: CPT | Performed by: OBSTETRICS & GYNECOLOGY

## 2020-08-19 PROCEDURE — 82962 GLUCOSE BLOOD TEST: CPT

## 2020-08-19 PROCEDURE — 85027 COMPLETE CBC AUTOMATED: CPT | Performed by: STUDENT IN AN ORGANIZED HEALTH CARE EDUCATION/TRAINING PROGRAM

## 2020-08-19 PROCEDURE — 25010000002 CALCIUM GLUCONATE PER 10 ML

## 2020-08-19 PROCEDURE — 97530 THERAPEUTIC ACTIVITIES: CPT | Performed by: OCCUPATIONAL THERAPIST

## 2020-08-19 PROCEDURE — 3E0336Z INTRODUCTION OF NUTRITIONAL SUBSTANCE INTO PERIPHERAL VEIN, PERCUTANEOUS APPROACH: ICD-10-PCS | Performed by: OBSTETRICS & GYNECOLOGY

## 2020-08-19 PROCEDURE — 25010000002 MAGNESIUM SULFATE PER 500 MG OF MAGNESIUM

## 2020-08-19 PROCEDURE — 25010000002 POTASSIUM CHLORIDE PER 2 MEQ OF POTASSIUM

## 2020-08-19 PROCEDURE — 80048 BASIC METABOLIC PNL TOTAL CA: CPT | Performed by: STUDENT IN AN ORGANIZED HEALTH CARE EDUCATION/TRAINING PROGRAM

## 2020-08-19 PROCEDURE — 25010000003 POTASSIUM CHLORIDE 10 MEQ/100ML SOLUTION: Performed by: STUDENT IN AN ORGANIZED HEALTH CARE EDUCATION/TRAINING PROGRAM

## 2020-08-19 PROCEDURE — 83735 ASSAY OF MAGNESIUM: CPT

## 2020-08-19 PROCEDURE — 25010000002 HYDROMORPHONE PER 4 MG: Performed by: STUDENT IN AN ORGANIZED HEALTH CARE EDUCATION/TRAINING PROGRAM

## 2020-08-19 RX ORDER — DEXTROSE MONOHYDRATE 25 G/50ML
25 INJECTION, SOLUTION INTRAVENOUS
Status: DISCONTINUED | OUTPATIENT
Start: 2020-08-19 | End: 2020-08-28 | Stop reason: HOSPADM

## 2020-08-19 RX ORDER — FAMOTIDINE 20 MG/1
20 TABLET, FILM COATED ORAL
Status: DISCONTINUED | OUTPATIENT
Start: 2020-08-19 | End: 2020-08-19

## 2020-08-19 RX ORDER — NICOTINE POLACRILEX 4 MG
15 LOZENGE BUCCAL
Status: DISCONTINUED | OUTPATIENT
Start: 2020-08-19 | End: 2020-08-28 | Stop reason: HOSPADM

## 2020-08-19 RX ORDER — FLUCONAZOLE 100 MG/1
150 TABLET ORAL EVERY 24 HOURS
Status: COMPLETED | OUTPATIENT
Start: 2020-08-19 | End: 2020-08-21

## 2020-08-19 RX ORDER — ACETAMINOPHEN 325 MG/1
650 TABLET ORAL EVERY 6 HOURS PRN
Status: DISCONTINUED | OUTPATIENT
Start: 2020-08-19 | End: 2020-08-28 | Stop reason: HOSPADM

## 2020-08-19 RX ORDER — NYSTATIN 100000 [USP'U]/G
POWDER TOPICAL EVERY 12 HOURS SCHEDULED
Status: DISCONTINUED | OUTPATIENT
Start: 2020-08-19 | End: 2020-08-28 | Stop reason: HOSPADM

## 2020-08-19 RX ORDER — POTASSIUM CHLORIDE 7.45 MG/ML
10 INJECTION INTRAVENOUS
Status: DISCONTINUED | OUTPATIENT
Start: 2020-08-19 | End: 2020-08-28 | Stop reason: HOSPADM

## 2020-08-19 RX ORDER — ALUMINA, MAGNESIA, AND SIMETHICONE 2400; 2400; 240 MG/30ML; MG/30ML; MG/30ML
15 SUSPENSION ORAL EVERY 6 HOURS PRN
Status: DISCONTINUED | OUTPATIENT
Start: 2020-08-19 | End: 2020-08-25

## 2020-08-19 RX ADMIN — FLUCONAZOLE 150 MG: 100 TABLET ORAL at 16:55

## 2020-08-19 RX ADMIN — ACETAMINOPHEN 650 MG: 325 TABLET, FILM COATED ORAL at 10:42

## 2020-08-19 RX ADMIN — APIXABAN 5 MG: 5 TABLET, FILM COATED ORAL at 20:24

## 2020-08-19 RX ADMIN — PANTOPRAZOLE SODIUM 40 MG: 40 INJECTION, POWDER, FOR SOLUTION INTRAVENOUS at 16:55

## 2020-08-19 RX ADMIN — APIXABAN 5 MG: 5 TABLET, FILM COATED ORAL at 10:42

## 2020-08-19 RX ADMIN — POTASSIUM CHLORIDE 10 MEQ: 7.46 INJECTION, SOLUTION INTRAVENOUS at 14:18

## 2020-08-19 RX ADMIN — NYSTATIN: 100000 POWDER TOPICAL at 20:25

## 2020-08-19 RX ADMIN — ACETAMINOPHEN ORAL SOLUTION 650 MG: 650 SOLUTION ORAL at 17:48

## 2020-08-19 RX ADMIN — GABAPENTIN 50 MG: 250 SOLUTION ORAL at 11:27

## 2020-08-19 RX ADMIN — Medication 1 CAPSULE: at 08:23

## 2020-08-19 RX ADMIN — HYDROMORPHONE HYDROCHLORIDE 0.5 MG: 1 INJECTION, SOLUTION INTRAMUSCULAR; INTRAVENOUS; SUBCUTANEOUS at 00:01

## 2020-08-19 RX ADMIN — POTASSIUM CHLORIDE 10 MEQ: 7.46 INJECTION, SOLUTION INTRAVENOUS at 11:29

## 2020-08-19 RX ADMIN — GABAPENTIN 300 MG: 250 SOLUTION ORAL at 20:24

## 2020-08-19 RX ADMIN — ACETAMINOPHEN ORAL SOLUTION 650 MG: 650 SOLUTION ORAL at 05:19

## 2020-08-19 RX ADMIN — GABAPENTIN 300 MG: 250 SOLUTION ORAL at 08:23

## 2020-08-19 RX ADMIN — POTASSIUM CHLORIDE 10 MEQ: 7.46 INJECTION, SOLUTION INTRAVENOUS at 10:43

## 2020-08-19 RX ADMIN — PANTOPRAZOLE SODIUM 40 MG: 40 INJECTION, POWDER, FOR SOLUTION INTRAVENOUS at 08:23

## 2020-08-19 RX ADMIN — NYSTATIN: 100000 POWDER TOPICAL at 11:17

## 2020-08-19 RX ADMIN — FLUOXETINE HYDROCHLORIDE 40 MG: 20 CAPSULE ORAL at 08:23

## 2020-08-19 RX ADMIN — GABAPENTIN 300 MG: 250 SOLUTION ORAL at 16:56

## 2020-08-19 RX ADMIN — SODIUM CHLORIDE, PRESERVATIVE FREE 10 ML: 5 INJECTION INTRAVENOUS at 20:26

## 2020-08-19 RX ADMIN — I.V. FAT EMULSION 250 ML: 20 EMULSION INTRAVENOUS at 18:06

## 2020-08-19 RX ADMIN — SODIUM CHLORIDE, PRESERVATIVE FREE 10 ML: 5 INJECTION INTRAVENOUS at 08:28

## 2020-08-19 RX ADMIN — POTASSIUM CHLORIDE 10 MEQ: 7.46 INJECTION, SOLUTION INTRAVENOUS at 15:23

## 2020-08-19 RX ADMIN — ACETAMINOPHEN ORAL SOLUTION 649.6 MG: 650 SOLUTION ORAL at 11:27

## 2020-08-19 RX ADMIN — METOPROLOL TARTRATE 25 MG: 25 TABLET, FILM COATED ORAL at 08:23

## 2020-08-19 RX ADMIN — METOPROLOL TARTRATE 25 MG: 25 TABLET, FILM COATED ORAL at 20:23

## 2020-08-19 RX ADMIN — POTASSIUM CHLORIDE 10 MEQ: 7.46 INJECTION, SOLUTION INTRAVENOUS at 13:07

## 2020-08-19 RX ADMIN — SODIUM CHLORIDE, POTASSIUM CHLORIDE, SODIUM LACTATE AND CALCIUM CHLORIDE 125 ML/HR: 600; 310; 30; 20 INJECTION, SOLUTION INTRAVENOUS at 20:25

## 2020-08-19 RX ADMIN — PYRIDOSTIGMINE BROMIDE 30 MG: 60 TABLET ORAL at 14:19

## 2020-08-19 RX ADMIN — POTASSIUM PHOSPHATE, MONOBASIC POTASSIUM PHOSPHATE, DIBASIC: 224; 236 INJECTION, SOLUTION, CONCENTRATE INTRAVENOUS at 18:06

## 2020-08-19 RX ADMIN — PYRIDOSTIGMINE BROMIDE 30 MG: 60 TABLET ORAL at 05:19

## 2020-08-19 RX ADMIN — PYRIDOSTIGMINE BROMIDE 30 MG: 60 TABLET ORAL at 20:24

## 2020-08-19 RX ADMIN — POTASSIUM CHLORIDE 10 MEQ: 7.46 INJECTION, SOLUTION INTRAVENOUS at 16:55

## 2020-08-19 RX ADMIN — GABAPENTIN 250 MG: 250 SOLUTION ORAL at 10:43

## 2020-08-20 LAB
ANION GAP SERPL CALCULATED.3IONS-SCNC: 10 MMOL/L (ref 5–15)
BUN SERPL-MCNC: 3 MG/DL (ref 8–23)
BUN/CREAT SERPL: 9.7 (ref 7–25)
CALCIUM SPEC-SCNC: 8.1 MG/DL (ref 8.6–10.5)
CHLORIDE SERPL-SCNC: 95 MMOL/L (ref 98–107)
CO2 SERPL-SCNC: 30 MMOL/L (ref 22–29)
CREAT SERPL-MCNC: 0.31 MG/DL (ref 0.57–1)
DEPRECATED RDW RBC AUTO: 49.7 FL (ref 37–54)
ERYTHROCYTE [DISTWIDTH] IN BLOOD BY AUTOMATED COUNT: 16.9 % (ref 12.3–15.4)
GFR SERPL CREATININE-BSD FRML MDRD: >150 ML/MIN/1.73
GLUCOSE BLDC GLUCOMTR-MCNC: 124 MG/DL (ref 70–130)
GLUCOSE BLDC GLUCOMTR-MCNC: 133 MG/DL (ref 70–130)
GLUCOSE BLDC GLUCOMTR-MCNC: 148 MG/DL (ref 70–130)
GLUCOSE SERPL-MCNC: 122 MG/DL (ref 65–99)
HCT VFR BLD AUTO: 28.7 % (ref 34–46.6)
HGB BLD-MCNC: 9.4 G/DL (ref 12–15.9)
MAGNESIUM SERPL-MCNC: 1.7 MG/DL (ref 1.6–2.4)
MCH RBC QN AUTO: 26.4 PG (ref 26.6–33)
MCHC RBC AUTO-ENTMCNC: 32.8 G/DL (ref 31.5–35.7)
MCV RBC AUTO: 80.6 FL (ref 79–97)
PLATELET # BLD AUTO: 375 10*3/MM3 (ref 140–450)
PMV BLD AUTO: 10 FL (ref 6–12)
POTASSIUM SERPL-SCNC: 3.2 MMOL/L (ref 3.5–5.2)
POTASSIUM SERPL-SCNC: 3.3 MMOL/L (ref 3.5–5.2)
RBC # BLD AUTO: 3.56 10*6/MM3 (ref 3.77–5.28)
SODIUM SERPL-SCNC: 135 MMOL/L (ref 136–145)
WBC # BLD AUTO: 8.45 10*3/MM3 (ref 3.4–10.8)

## 2020-08-20 PROCEDURE — 25010000003 POTASSIUM CHLORIDE 10 MEQ/100ML SOLUTION: Performed by: STUDENT IN AN ORGANIZED HEALTH CARE EDUCATION/TRAINING PROGRAM

## 2020-08-20 PROCEDURE — 84132 ASSAY OF SERUM POTASSIUM: CPT | Performed by: OBSTETRICS & GYNECOLOGY

## 2020-08-20 PROCEDURE — 85027 COMPLETE CBC AUTOMATED: CPT | Performed by: STUDENT IN AN ORGANIZED HEALTH CARE EDUCATION/TRAINING PROGRAM

## 2020-08-20 PROCEDURE — 25010000002 MAGNESIUM SULFATE PER 500 MG OF MAGNESIUM

## 2020-08-20 PROCEDURE — 25010000002 POTASSIUM CHLORIDE PER 2 MEQ OF POTASSIUM

## 2020-08-20 PROCEDURE — 80048 BASIC METABOLIC PNL TOTAL CA: CPT | Performed by: STUDENT IN AN ORGANIZED HEALTH CARE EDUCATION/TRAINING PROGRAM

## 2020-08-20 PROCEDURE — 25010000002 HYDROMORPHONE PER 4 MG: Performed by: STUDENT IN AN ORGANIZED HEALTH CARE EDUCATION/TRAINING PROGRAM

## 2020-08-20 PROCEDURE — 25010000003 MAGNESIUM SULFATE 4 GM/100ML SOLUTION: Performed by: OBSTETRICS & GYNECOLOGY

## 2020-08-20 PROCEDURE — 83735 ASSAY OF MAGNESIUM: CPT

## 2020-08-20 PROCEDURE — 25010000002 CALCIUM GLUCONATE PER 10 ML

## 2020-08-20 PROCEDURE — 82962 GLUCOSE BLOOD TEST: CPT

## 2020-08-20 PROCEDURE — 97530 THERAPEUTIC ACTIVITIES: CPT

## 2020-08-20 PROCEDURE — 25010000002 LORAZEPAM PER 2 MG: Performed by: OBSTETRICS & GYNECOLOGY

## 2020-08-20 RX ADMIN — POTASSIUM PHOSPHATE, MONOBASIC POTASSIUM PHOSPHATE, DIBASIC: 224; 236 INJECTION, SOLUTION, CONCENTRATE INTRAVENOUS at 18:29

## 2020-08-20 RX ADMIN — Medication 1 CAPSULE: at 09:23

## 2020-08-20 RX ADMIN — GABAPENTIN 300 MG: 250 SOLUTION ORAL at 12:05

## 2020-08-20 RX ADMIN — POTASSIUM CHLORIDE 10 MEQ: 7.46 INJECTION, SOLUTION INTRAVENOUS at 10:49

## 2020-08-20 RX ADMIN — GABAPENTIN 300 MG: 250 SOLUTION ORAL at 21:45

## 2020-08-20 RX ADMIN — MAGNESIUM SULFATE HEPTAHYDRATE 4 G: 40 INJECTION, SOLUTION INTRAVENOUS at 04:25

## 2020-08-20 RX ADMIN — METOPROLOL TARTRATE 25 MG: 25 TABLET, FILM COATED ORAL at 21:46

## 2020-08-20 RX ADMIN — PANTOPRAZOLE SODIUM 40 MG: 40 INJECTION, POWDER, FOR SOLUTION INTRAVENOUS at 09:22

## 2020-08-20 RX ADMIN — PYRIDOSTIGMINE BROMIDE 30 MG: 60 TABLET ORAL at 15:42

## 2020-08-20 RX ADMIN — NYSTATIN: 100000 POWDER TOPICAL at 21:47

## 2020-08-20 RX ADMIN — PANTOPRAZOLE SODIUM 40 MG: 40 INJECTION, POWDER, FOR SOLUTION INTRAVENOUS at 18:29

## 2020-08-20 RX ADMIN — HYDROMORPHONE HYDROCHLORIDE 0.5 MG: 1 INJECTION, SOLUTION INTRAMUSCULAR; INTRAVENOUS; SUBCUTANEOUS at 14:41

## 2020-08-20 RX ADMIN — GABAPENTIN 300 MG: 250 SOLUTION ORAL at 18:29

## 2020-08-20 RX ADMIN — FLUOXETINE HYDROCHLORIDE 40 MG: 20 CAPSULE ORAL at 09:23

## 2020-08-20 RX ADMIN — METOPROLOL TARTRATE 25 MG: 25 TABLET, FILM COATED ORAL at 09:24

## 2020-08-20 RX ADMIN — POTASSIUM CHLORIDE 10 MEQ: 7.46 INJECTION, SOLUTION INTRAVENOUS at 14:46

## 2020-08-20 RX ADMIN — ACETAMINOPHEN ORAL SOLUTION 650 MG: 650 SOLUTION ORAL at 05:53

## 2020-08-20 RX ADMIN — SODIUM CHLORIDE, POTASSIUM CHLORIDE, SODIUM LACTATE AND CALCIUM CHLORIDE 125 ML/HR: 600; 310; 30; 20 INJECTION, SOLUTION INTRAVENOUS at 04:26

## 2020-08-20 RX ADMIN — APIXABAN 5 MG: 5 TABLET, FILM COATED ORAL at 09:23

## 2020-08-20 RX ADMIN — ACETAMINOPHEN ORAL SOLUTION 650 MG: 650 SOLUTION ORAL at 12:01

## 2020-08-20 RX ADMIN — SODIUM CHLORIDE, PRESERVATIVE FREE 10 ML: 5 INJECTION INTRAVENOUS at 21:47

## 2020-08-20 RX ADMIN — GABAPENTIN 300 MG: 250 SOLUTION ORAL at 09:23

## 2020-08-20 RX ADMIN — PYRIDOSTIGMINE BROMIDE 30 MG: 60 TABLET ORAL at 05:53

## 2020-08-20 RX ADMIN — FLUCONAZOLE 150 MG: 100 TABLET ORAL at 18:30

## 2020-08-20 RX ADMIN — NYSTATIN: 100000 POWDER TOPICAL at 09:22

## 2020-08-20 RX ADMIN — ACETAMINOPHEN ORAL SOLUTION 650 MG: 650 SOLUTION ORAL at 18:29

## 2020-08-20 RX ADMIN — LORAZEPAM 0.5 MG: 2 INJECTION INTRAMUSCULAR; INTRAVENOUS at 00:01

## 2020-08-20 RX ADMIN — PYRIDOSTIGMINE BROMIDE 30 MG: 60 TABLET ORAL at 21:52

## 2020-08-20 RX ADMIN — ACETAMINOPHEN ORAL SOLUTION 650 MG: 650 SOLUTION ORAL at 00:00

## 2020-08-20 RX ADMIN — POTASSIUM CHLORIDE 10 MEQ: 7.46 INJECTION, SOLUTION INTRAVENOUS at 13:15

## 2020-08-20 RX ADMIN — APIXABAN 5 MG: 5 TABLET, FILM COATED ORAL at 21:45

## 2020-08-21 LAB
ANION GAP SERPL CALCULATED.3IONS-SCNC: 5 MMOL/L (ref 5–15)
BUN SERPL-MCNC: 8 MG/DL (ref 8–23)
BUN/CREAT SERPL: 26.7 (ref 7–25)
CALCIUM SPEC-SCNC: 7.9 MG/DL (ref 8.6–10.5)
CHLORIDE SERPL-SCNC: 99 MMOL/L (ref 98–107)
CO2 SERPL-SCNC: 30 MMOL/L (ref 22–29)
CREAT SERPL-MCNC: 0.3 MG/DL (ref 0.57–1)
DEPRECATED RDW RBC AUTO: 53.4 FL (ref 37–54)
ERYTHROCYTE [DISTWIDTH] IN BLOOD BY AUTOMATED COUNT: 17.5 % (ref 12.3–15.4)
GFR SERPL CREATININE-BSD FRML MDRD: >150 ML/MIN/1.73
GLUCOSE BLDC GLUCOMTR-MCNC: 145 MG/DL (ref 70–130)
GLUCOSE BLDC GLUCOMTR-MCNC: 154 MG/DL (ref 70–130)
GLUCOSE BLDC GLUCOMTR-MCNC: 155 MG/DL (ref 70–130)
GLUCOSE BLDC GLUCOMTR-MCNC: 159 MG/DL (ref 70–130)
GLUCOSE BLDC GLUCOMTR-MCNC: 171 MG/DL (ref 70–130)
GLUCOSE SERPL-MCNC: 157 MG/DL (ref 65–99)
HCT VFR BLD AUTO: 28.7 % (ref 34–46.6)
HCT VFR BLD AUTO: 31 % (ref 34–46.6)
HGB BLD-MCNC: 8.9 G/DL (ref 12–15.9)
HGB BLD-MCNC: 9.5 G/DL (ref 12–15.9)
MAGNESIUM SERPL-MCNC: 2.3 MG/DL (ref 1.6–2.4)
MCH RBC QN AUTO: 25.8 PG (ref 26.6–33)
MCHC RBC AUTO-ENTMCNC: 31 G/DL (ref 31.5–35.7)
MCV RBC AUTO: 83.2 FL (ref 79–97)
PHOSPHATE SERPL-MCNC: 2.3 MG/DL (ref 2.5–4.5)
PLATELET # BLD AUTO: 348 10*3/MM3 (ref 140–450)
PMV BLD AUTO: 9.3 FL (ref 6–12)
POTASSIUM SERPL-SCNC: 3.5 MMOL/L (ref 3.5–5.2)
RBC # BLD AUTO: 3.45 10*6/MM3 (ref 3.77–5.28)
SODIUM SERPL-SCNC: 134 MMOL/L (ref 136–145)
WBC # BLD AUTO: 6.44 10*3/MM3 (ref 3.4–10.8)

## 2020-08-21 PROCEDURE — 80048 BASIC METABOLIC PNL TOTAL CA: CPT | Performed by: STUDENT IN AN ORGANIZED HEALTH CARE EDUCATION/TRAINING PROGRAM

## 2020-08-21 PROCEDURE — 25010000002 LORAZEPAM PER 2 MG: Performed by: OBSTETRICS & GYNECOLOGY

## 2020-08-21 PROCEDURE — 84100 ASSAY OF PHOSPHORUS: CPT | Performed by: OBSTETRICS & GYNECOLOGY

## 2020-08-21 PROCEDURE — 83735 ASSAY OF MAGNESIUM: CPT | Performed by: OBSTETRICS & GYNECOLOGY

## 2020-08-21 PROCEDURE — 85018 HEMOGLOBIN: CPT | Performed by: OBSTETRICS & GYNECOLOGY

## 2020-08-21 PROCEDURE — 25010000002 CALCIUM GLUCONATE PER 10 ML

## 2020-08-21 PROCEDURE — 63710000001 INSULIN REGULAR HUMAN PER 5 UNITS

## 2020-08-21 PROCEDURE — 85027 COMPLETE CBC AUTOMATED: CPT | Performed by: STUDENT IN AN ORGANIZED HEALTH CARE EDUCATION/TRAINING PROGRAM

## 2020-08-21 PROCEDURE — 25010000002 MAGNESIUM SULFATE PER 500 MG OF MAGNESIUM

## 2020-08-21 PROCEDURE — 25010000002 POTASSIUM CHLORIDE PER 2 MEQ OF POTASSIUM

## 2020-08-21 PROCEDURE — 82962 GLUCOSE BLOOD TEST: CPT

## 2020-08-21 PROCEDURE — 85014 HEMATOCRIT: CPT | Performed by: OBSTETRICS & GYNECOLOGY

## 2020-08-21 PROCEDURE — 25010000003 POTASSIUM CHLORIDE 10 MEQ/100ML SOLUTION: Performed by: STUDENT IN AN ORGANIZED HEALTH CARE EDUCATION/TRAINING PROGRAM

## 2020-08-21 RX ORDER — GABAPENTIN 300 MG/1
300 CAPSULE ORAL 3 TIMES DAILY PRN
Status: DISCONTINUED | OUTPATIENT
Start: 2020-08-21 | End: 2020-08-28

## 2020-08-21 RX ORDER — PROMETHAZINE HYDROCHLORIDE 12.5 MG/1
12.5 TABLET ORAL EVERY 6 HOURS PRN
Status: DISCONTINUED | OUTPATIENT
Start: 2020-08-21 | End: 2020-08-28 | Stop reason: HOSPADM

## 2020-08-21 RX ORDER — TRAMADOL HYDROCHLORIDE 50 MG/1
50 TABLET ORAL EVERY 6 HOURS PRN
Status: DISCONTINUED | OUTPATIENT
Start: 2020-08-21 | End: 2020-08-28 | Stop reason: HOSPADM

## 2020-08-21 RX ORDER — ONDANSETRON 4 MG/1
4 TABLET, ORALLY DISINTEGRATING ORAL EVERY 6 HOURS PRN
Status: DISCONTINUED | OUTPATIENT
Start: 2020-08-21 | End: 2020-08-28 | Stop reason: HOSPADM

## 2020-08-21 RX ORDER — PANTOPRAZOLE SODIUM 40 MG/1
40 TABLET, DELAYED RELEASE ORAL
Status: DISCONTINUED | OUTPATIENT
Start: 2020-08-21 | End: 2020-08-28 | Stop reason: HOSPADM

## 2020-08-21 RX ADMIN — INSULIN HUMAN 2 UNITS: 100 INJECTION, SOLUTION PARENTERAL at 23:31

## 2020-08-21 RX ADMIN — ACETAMINOPHEN ORAL SOLUTION 650 MG: 650 SOLUTION ORAL at 00:32

## 2020-08-21 RX ADMIN — INSULIN HUMAN 2 UNITS: 100 INJECTION, SOLUTION PARENTERAL at 12:09

## 2020-08-21 RX ADMIN — PANTOPRAZOLE SODIUM 40 MG: 40 INJECTION, POWDER, FOR SOLUTION INTRAVENOUS at 07:58

## 2020-08-21 RX ADMIN — POTASSIUM CHLORIDE 10 MEQ: 7.46 INJECTION, SOLUTION INTRAVENOUS at 15:22

## 2020-08-21 RX ADMIN — PYRIDOSTIGMINE BROMIDE 30 MG: 60 TABLET ORAL at 15:03

## 2020-08-21 RX ADMIN — POTASSIUM CHLORIDE 10 MEQ: 7.46 INJECTION, SOLUTION INTRAVENOUS at 09:41

## 2020-08-21 RX ADMIN — POTASSIUM PHOSPHATE, MONOBASIC POTASSIUM PHOSPHATE, DIBASIC: 224; 236 INJECTION, SOLUTION, CONCENTRATE INTRAVENOUS at 18:06

## 2020-08-21 RX ADMIN — NYSTATIN: 100000 POWDER TOPICAL at 21:44

## 2020-08-21 RX ADMIN — FLUOXETINE HYDROCHLORIDE 40 MG: 20 CAPSULE ORAL at 10:45

## 2020-08-21 RX ADMIN — Medication 1 CAPSULE: at 10:46

## 2020-08-21 RX ADMIN — ACETAMINOPHEN ORAL SOLUTION 649.6 MG: 650 SOLUTION ORAL at 12:10

## 2020-08-21 RX ADMIN — INSULIN HUMAN 2 UNITS: 100 INJECTION, SOLUTION PARENTERAL at 00:32

## 2020-08-21 RX ADMIN — POTASSIUM PHOSPHATE, MONOBASIC AND POTASSIUM PHOSPHATE, DIBASIC 15 MMOL: 224; 236 INJECTION, SOLUTION, CONCENTRATE INTRAVENOUS at 18:07

## 2020-08-21 RX ADMIN — NYSTATIN: 100000 POWDER TOPICAL at 10:46

## 2020-08-21 RX ADMIN — FLUCONAZOLE 150 MG: 100 TABLET ORAL at 17:12

## 2020-08-21 RX ADMIN — INSULIN HUMAN 2 UNITS: 100 INJECTION, SOLUTION PARENTERAL at 06:03

## 2020-08-21 RX ADMIN — LORAZEPAM 0.5 MG: 2 INJECTION INTRAMUSCULAR; INTRAVENOUS at 15:17

## 2020-08-21 RX ADMIN — METOPROLOL TARTRATE 25 MG: 25 TABLET, FILM COATED ORAL at 20:41

## 2020-08-21 RX ADMIN — PANTOPRAZOLE SODIUM 40 MG: 40 TABLET, DELAYED RELEASE ORAL at 17:13

## 2020-08-21 RX ADMIN — METOPROLOL TARTRATE 25 MG: 25 TABLET, FILM COATED ORAL at 10:46

## 2020-08-21 RX ADMIN — ACETAMINOPHEN ORAL SOLUTION 650 MG: 650 SOLUTION ORAL at 06:04

## 2020-08-21 RX ADMIN — TRAMADOL HYDROCHLORIDE 50 MG: 50 TABLET, FILM COATED ORAL at 20:42

## 2020-08-21 RX ADMIN — I.V. FAT EMULSION 250 ML: 20 EMULSION INTRAVENOUS at 18:05

## 2020-08-21 RX ADMIN — PYRIDOSTIGMINE BROMIDE 30 MG: 60 TABLET ORAL at 06:03

## 2020-08-21 RX ADMIN — LORAZEPAM 0.5 MG: 2 INJECTION INTRAMUSCULAR; INTRAVENOUS at 08:29

## 2020-08-21 RX ADMIN — SODIUM CHLORIDE, PRESERVATIVE FREE 10 ML: 5 INJECTION INTRAVENOUS at 20:43

## 2020-08-21 RX ADMIN — GABAPENTIN 300 MG: 250 SOLUTION ORAL at 07:58

## 2020-08-21 RX ADMIN — POTASSIUM CHLORIDE 10 MEQ: 7.46 INJECTION, SOLUTION INTRAVENOUS at 07:58

## 2020-08-21 RX ADMIN — POTASSIUM CHLORIDE 10 MEQ: 7.46 INJECTION, SOLUTION INTRAVENOUS at 13:46

## 2020-08-21 RX ADMIN — PYRIDOSTIGMINE BROMIDE 30 MG: 60 TABLET ORAL at 20:43

## 2020-08-22 LAB
ANION GAP SERPL CALCULATED.3IONS-SCNC: 8 MMOL/L (ref 5–15)
BUN SERPL-MCNC: 14 MG/DL (ref 8–23)
BUN/CREAT SERPL: 45.2 (ref 7–25)
CALCIUM SPEC-SCNC: 8.4 MG/DL (ref 8.6–10.5)
CHLORIDE SERPL-SCNC: 99 MMOL/L (ref 98–107)
CO2 SERPL-SCNC: 25 MMOL/L (ref 22–29)
CREAT SERPL-MCNC: 0.31 MG/DL (ref 0.57–1)
DEPRECATED RDW RBC AUTO: 53.1 FL (ref 37–54)
ERYTHROCYTE [DISTWIDTH] IN BLOOD BY AUTOMATED COUNT: 17.7 % (ref 12.3–15.4)
GFR SERPL CREATININE-BSD FRML MDRD: >150 ML/MIN/1.73
GLUCOSE BLDC GLUCOMTR-MCNC: 134 MG/DL (ref 70–130)
GLUCOSE BLDC GLUCOMTR-MCNC: 139 MG/DL (ref 70–130)
GLUCOSE BLDC GLUCOMTR-MCNC: 153 MG/DL (ref 70–130)
GLUCOSE BLDC GLUCOMTR-MCNC: 163 MG/DL (ref 70–130)
GLUCOSE SERPL-MCNC: 152 MG/DL (ref 65–99)
HCT VFR BLD AUTO: 28.1 % (ref 34–46.6)
HGB BLD-MCNC: 8.7 G/DL (ref 12–15.9)
MCH RBC QN AUTO: 25.5 PG (ref 26.6–33)
MCHC RBC AUTO-ENTMCNC: 31 G/DL (ref 31.5–35.7)
MCV RBC AUTO: 82.4 FL (ref 79–97)
PLATELET # BLD AUTO: 385 10*3/MM3 (ref 140–450)
PMV BLD AUTO: 10 FL (ref 6–12)
POTASSIUM SERPL-SCNC: 4.2 MMOL/L (ref 3.5–5.2)
RBC # BLD AUTO: 3.41 10*6/MM3 (ref 3.77–5.28)
SODIUM SERPL-SCNC: 132 MMOL/L (ref 136–145)
WBC # BLD AUTO: 7.55 10*3/MM3 (ref 3.4–10.8)

## 2020-08-22 PROCEDURE — 25010000002 POTASSIUM CHLORIDE PER 2 MEQ OF POTASSIUM

## 2020-08-22 PROCEDURE — 63710000001 PROMETHAZINE PER 12.5 MG: Performed by: STUDENT IN AN ORGANIZED HEALTH CARE EDUCATION/TRAINING PROGRAM

## 2020-08-22 PROCEDURE — 97110 THERAPEUTIC EXERCISES: CPT

## 2020-08-22 PROCEDURE — 63710000001 ONDANSETRON ODT 4 MG TABLET DISPERSIBLE: Performed by: STUDENT IN AN ORGANIZED HEALTH CARE EDUCATION/TRAINING PROGRAM

## 2020-08-22 PROCEDURE — 80048 BASIC METABOLIC PNL TOTAL CA: CPT | Performed by: STUDENT IN AN ORGANIZED HEALTH CARE EDUCATION/TRAINING PROGRAM

## 2020-08-22 PROCEDURE — 25010000002 CALCIUM GLUCONATE PER 10 ML

## 2020-08-22 PROCEDURE — 82962 GLUCOSE BLOOD TEST: CPT

## 2020-08-22 PROCEDURE — 85027 COMPLETE CBC AUTOMATED: CPT | Performed by: STUDENT IN AN ORGANIZED HEALTH CARE EDUCATION/TRAINING PROGRAM

## 2020-08-22 PROCEDURE — 25010000002 ENOXAPARIN PER 10 MG: Performed by: OBSTETRICS & GYNECOLOGY

## 2020-08-22 PROCEDURE — 25010000002 LORAZEPAM PER 2 MG: Performed by: OBSTETRICS & GYNECOLOGY

## 2020-08-22 PROCEDURE — 25010000002 MAGNESIUM SULFATE PER 500 MG OF MAGNESIUM

## 2020-08-22 PROCEDURE — 63710000001 INSULIN REGULAR HUMAN PER 5 UNITS

## 2020-08-22 RX ADMIN — Medication 1 CAPSULE: at 09:40

## 2020-08-22 RX ADMIN — INSULIN HUMAN 2 UNITS: 100 INJECTION, SOLUTION PARENTERAL at 23:59

## 2020-08-22 RX ADMIN — LORAZEPAM 0.5 MG: 2 INJECTION INTRAMUSCULAR; INTRAVENOUS at 21:31

## 2020-08-22 RX ADMIN — FLUOXETINE HYDROCHLORIDE 40 MG: 20 CAPSULE ORAL at 09:40

## 2020-08-22 RX ADMIN — NYSTATIN: 100000 POWDER TOPICAL at 21:31

## 2020-08-22 RX ADMIN — PYRIDOSTIGMINE BROMIDE 30 MG: 60 TABLET ORAL at 06:23

## 2020-08-22 RX ADMIN — PYRIDOSTIGMINE BROMIDE 30 MG: 60 TABLET ORAL at 15:14

## 2020-08-22 RX ADMIN — INSULIN HUMAN 2 UNITS: 100 INJECTION, SOLUTION PARENTERAL at 06:26

## 2020-08-22 RX ADMIN — PANTOPRAZOLE SODIUM 40 MG: 40 TABLET, DELAYED RELEASE ORAL at 09:39

## 2020-08-22 RX ADMIN — SODIUM CHLORIDE, PRESERVATIVE FREE 10 ML: 5 INJECTION INTRAVENOUS at 09:41

## 2020-08-22 RX ADMIN — POTASSIUM PHOSPHATE, MONOBASIC POTASSIUM PHOSPHATE, DIBASIC: 224; 236 INJECTION, SOLUTION, CONCENTRATE INTRAVENOUS at 17:57

## 2020-08-22 RX ADMIN — NYSTATIN: 100000 POWDER TOPICAL at 09:41

## 2020-08-22 RX ADMIN — METOPROLOL TARTRATE 25 MG: 25 TABLET, FILM COATED ORAL at 09:40

## 2020-08-22 RX ADMIN — ONDANSETRON 4 MG: 4 TABLET, ORALLY DISINTEGRATING ORAL at 20:29

## 2020-08-22 RX ADMIN — PYRIDOSTIGMINE BROMIDE 30 MG: 60 TABLET ORAL at 21:30

## 2020-08-22 RX ADMIN — PROMETHAZINE HYDROCHLORIDE 12.5 MG: 12.5 TABLET ORAL at 12:01

## 2020-08-22 RX ADMIN — PANTOPRAZOLE SODIUM 40 MG: 40 TABLET, DELAYED RELEASE ORAL at 18:01

## 2020-08-22 RX ADMIN — ENOXAPARIN SODIUM 40 MG: 40 INJECTION SUBCUTANEOUS at 11:56

## 2020-08-22 RX ADMIN — METOPROLOL TARTRATE 25 MG: 25 TABLET, FILM COATED ORAL at 21:30

## 2020-08-22 RX ADMIN — SODIUM CHLORIDE, PRESERVATIVE FREE 10 ML: 5 INJECTION INTRAVENOUS at 21:31

## 2020-08-23 LAB
ANION GAP SERPL CALCULATED.3IONS-SCNC: 8 MMOL/L (ref 5–15)
BUN SERPL-MCNC: 18 MG/DL (ref 8–23)
BUN/CREAT SERPL: 62.1 (ref 7–25)
CALCIUM SPEC-SCNC: 8.3 MG/DL (ref 8.6–10.5)
CHLORIDE SERPL-SCNC: 99 MMOL/L (ref 98–107)
CO2 SERPL-SCNC: 23 MMOL/L (ref 22–29)
CREAT SERPL-MCNC: 0.29 MG/DL (ref 0.57–1)
DEPRECATED RDW RBC AUTO: 54.4 FL (ref 37–54)
ERYTHROCYTE [DISTWIDTH] IN BLOOD BY AUTOMATED COUNT: 17.8 % (ref 12.3–15.4)
GFR SERPL CREATININE-BSD FRML MDRD: >150 ML/MIN/1.73
GLUCOSE BLDC GLUCOMTR-MCNC: 144 MG/DL (ref 70–130)
GLUCOSE BLDC GLUCOMTR-MCNC: 148 MG/DL (ref 70–130)
GLUCOSE BLDC GLUCOMTR-MCNC: 152 MG/DL (ref 70–130)
GLUCOSE BLDC GLUCOMTR-MCNC: 162 MG/DL (ref 70–130)
GLUCOSE SERPL-MCNC: 142 MG/DL (ref 65–99)
HCT VFR BLD AUTO: 28.6 % (ref 34–46.6)
HGB BLD-MCNC: 8.8 G/DL (ref 12–15.9)
MCH RBC QN AUTO: 25.7 PG (ref 26.6–33)
MCHC RBC AUTO-ENTMCNC: 30.8 G/DL (ref 31.5–35.7)
MCV RBC AUTO: 83.6 FL (ref 79–97)
PLATELET # BLD AUTO: 386 10*3/MM3 (ref 140–450)
PMV BLD AUTO: 9.8 FL (ref 6–12)
POTASSIUM SERPL-SCNC: 4 MMOL/L (ref 3.5–5.2)
RBC # BLD AUTO: 3.42 10*6/MM3 (ref 3.77–5.28)
SODIUM SERPL-SCNC: 130 MMOL/L (ref 136–145)
WBC # BLD AUTO: 6.38 10*3/MM3 (ref 3.4–10.8)

## 2020-08-23 PROCEDURE — 85027 COMPLETE CBC AUTOMATED: CPT | Performed by: STUDENT IN AN ORGANIZED HEALTH CARE EDUCATION/TRAINING PROGRAM

## 2020-08-23 PROCEDURE — 99233 SBSQ HOSP IP/OBS HIGH 50: CPT | Performed by: NURSE PRACTITIONER

## 2020-08-23 PROCEDURE — 63710000001 PROMETHAZINE PER 12.5 MG: Performed by: STUDENT IN AN ORGANIZED HEALTH CARE EDUCATION/TRAINING PROGRAM

## 2020-08-23 PROCEDURE — 80048 BASIC METABOLIC PNL TOTAL CA: CPT | Performed by: STUDENT IN AN ORGANIZED HEALTH CARE EDUCATION/TRAINING PROGRAM

## 2020-08-23 PROCEDURE — 25010000002 CALCIUM GLUCONATE PER 10 ML

## 2020-08-23 PROCEDURE — 63710000001 ONDANSETRON ODT 4 MG TABLET DISPERSIBLE: Performed by: STUDENT IN AN ORGANIZED HEALTH CARE EDUCATION/TRAINING PROGRAM

## 2020-08-23 PROCEDURE — 82962 GLUCOSE BLOOD TEST: CPT

## 2020-08-23 PROCEDURE — 25010000002 POTASSIUM CHLORIDE PER 2 MEQ OF POTASSIUM

## 2020-08-23 PROCEDURE — 63710000001 INSULIN REGULAR HUMAN PER 5 UNITS

## 2020-08-23 PROCEDURE — 25010000002 LORAZEPAM PER 2 MG: Performed by: OBSTETRICS & GYNECOLOGY

## 2020-08-23 PROCEDURE — 25010000002 MAGNESIUM SULFATE PER 500 MG OF MAGNESIUM

## 2020-08-23 RX ORDER — LORAZEPAM 2 MG/ML
0.5 INJECTION INTRAMUSCULAR EVERY 4 HOURS PRN
Status: DISCONTINUED | OUTPATIENT
Start: 2020-08-23 | End: 2020-08-28 | Stop reason: HOSPADM

## 2020-08-23 RX ADMIN — NYSTATIN: 100000 POWDER TOPICAL at 20:45

## 2020-08-23 RX ADMIN — NYSTATIN: 100000 POWDER TOPICAL at 08:00

## 2020-08-23 RX ADMIN — Medication 1 CAPSULE: at 08:00

## 2020-08-23 RX ADMIN — LORAZEPAM 0.5 MG: 2 INJECTION INTRAMUSCULAR; INTRAVENOUS at 21:26

## 2020-08-23 RX ADMIN — ONDANSETRON 4 MG: 4 TABLET, ORALLY DISINTEGRATING ORAL at 10:31

## 2020-08-23 RX ADMIN — PYRIDOSTIGMINE BROMIDE 30 MG: 60 TABLET ORAL at 05:43

## 2020-08-23 RX ADMIN — PROMETHAZINE HYDROCHLORIDE 12.5 MG: 12.5 TABLET ORAL at 17:48

## 2020-08-23 RX ADMIN — METOPROLOL TARTRATE 25 MG: 25 TABLET, FILM COATED ORAL at 20:44

## 2020-08-23 RX ADMIN — INSULIN HUMAN 2 UNITS: 100 INJECTION, SOLUTION PARENTERAL at 05:43

## 2020-08-23 RX ADMIN — METOPROLOL TARTRATE 25 MG: 25 TABLET, FILM COATED ORAL at 08:00

## 2020-08-23 RX ADMIN — I.V. FAT EMULSION 250 ML: 20 EMULSION INTRAVENOUS at 17:49

## 2020-08-23 RX ADMIN — PYRIDOSTIGMINE BROMIDE 30 MG: 60 TABLET ORAL at 20:44

## 2020-08-23 RX ADMIN — ACETAMINOPHEN 650 MG: 325 TABLET, FILM COATED ORAL at 05:52

## 2020-08-23 RX ADMIN — SODIUM CHLORIDE, PRESERVATIVE FREE 10 ML: 5 INJECTION INTRAVENOUS at 08:01

## 2020-08-23 RX ADMIN — FLUOXETINE HYDROCHLORIDE 40 MG: 20 CAPSULE ORAL at 08:00

## 2020-08-23 RX ADMIN — POTASSIUM PHOSPHATE, MONOBASIC POTASSIUM PHOSPHATE, DIBASIC: 224; 236 INJECTION, SOLUTION, CONCENTRATE INTRAVENOUS at 17:49

## 2020-08-23 RX ADMIN — PANTOPRAZOLE SODIUM 40 MG: 40 TABLET, DELAYED RELEASE ORAL at 17:48

## 2020-08-23 RX ADMIN — PYRIDOSTIGMINE BROMIDE 30 MG: 60 TABLET ORAL at 14:19

## 2020-08-23 RX ADMIN — PANTOPRAZOLE SODIUM 40 MG: 40 TABLET, DELAYED RELEASE ORAL at 07:50

## 2020-08-24 ENCOUNTER — ANESTHESIA (OUTPATIENT)
Dept: GASTROENTEROLOGY | Facility: HOSPITAL | Age: 70
End: 2020-08-24

## 2020-08-24 ENCOUNTER — ANESTHESIA EVENT (OUTPATIENT)
Dept: GASTROENTEROLOGY | Facility: HOSPITAL | Age: 70
End: 2020-08-24

## 2020-08-24 LAB
ANION GAP SERPL CALCULATED.3IONS-SCNC: 9 MMOL/L (ref 5–15)
BUN SERPL-MCNC: 17 MG/DL (ref 8–23)
BUN/CREAT SERPL: 44.7 (ref 7–25)
CALCIUM SPEC-SCNC: 8.3 MG/DL (ref 8.6–10.5)
CHLORIDE SERPL-SCNC: 99 MMOL/L (ref 98–107)
CO2 SERPL-SCNC: 24 MMOL/L (ref 22–29)
CREAT SERPL-MCNC: 0.38 MG/DL (ref 0.57–1)
DEPRECATED RDW RBC AUTO: 54.7 FL (ref 37–54)
ERYTHROCYTE [DISTWIDTH] IN BLOOD BY AUTOMATED COUNT: 17.5 % (ref 12.3–15.4)
GFR SERPL CREATININE-BSD FRML MDRD: >150 ML/MIN/1.73
GLUCOSE BLDC GLUCOMTR-MCNC: 146 MG/DL (ref 70–130)
GLUCOSE BLDC GLUCOMTR-MCNC: 156 MG/DL (ref 70–130)
GLUCOSE SERPL-MCNC: 142 MG/DL (ref 65–99)
HCT VFR BLD AUTO: 28.5 % (ref 34–46.6)
HGB BLD-MCNC: 8.4 G/DL (ref 12–15.9)
MCH RBC QN AUTO: 25.1 PG (ref 26.6–33)
MCHC RBC AUTO-ENTMCNC: 29.5 G/DL (ref 31.5–35.7)
MCV RBC AUTO: 85.1 FL (ref 79–97)
PLATELET # BLD AUTO: 346 10*3/MM3 (ref 140–450)
PMV BLD AUTO: 9.6 FL (ref 6–12)
POTASSIUM SERPL-SCNC: 4.2 MMOL/L (ref 3.5–5.2)
RBC # BLD AUTO: 3.35 10*6/MM3 (ref 3.77–5.28)
SODIUM SERPL-SCNC: 132 MMOL/L (ref 136–145)
WBC # BLD AUTO: 5.93 10*3/MM3 (ref 3.4–10.8)

## 2020-08-24 PROCEDURE — 25010000002 CALCIUM GLUCONATE PER 10 ML

## 2020-08-24 PROCEDURE — 63710000001 INSULIN REGULAR HUMAN PER 5 UNITS

## 2020-08-24 PROCEDURE — 97164 PT RE-EVAL EST PLAN CARE: CPT

## 2020-08-24 PROCEDURE — 25010000002 MAGNESIUM SULFATE PER 500 MG OF MAGNESIUM

## 2020-08-24 PROCEDURE — 43239 EGD BIOPSY SINGLE/MULTIPLE: CPT | Performed by: INTERNAL MEDICINE

## 2020-08-24 PROCEDURE — 80048 BASIC METABOLIC PNL TOTAL CA: CPT | Performed by: STUDENT IN AN ORGANIZED HEALTH CARE EDUCATION/TRAINING PROGRAM

## 2020-08-24 PROCEDURE — 25010000002 POTASSIUM CHLORIDE PER 2 MEQ OF POTASSIUM

## 2020-08-24 PROCEDURE — 63710000001 ONDANSETRON ODT 4 MG TABLET DISPERSIBLE: Performed by: STUDENT IN AN ORGANIZED HEALTH CARE EDUCATION/TRAINING PROGRAM

## 2020-08-24 PROCEDURE — 85027 COMPLETE CBC AUTOMATED: CPT | Performed by: STUDENT IN AN ORGANIZED HEALTH CARE EDUCATION/TRAINING PROGRAM

## 2020-08-24 PROCEDURE — 88305 TISSUE EXAM BY PATHOLOGIST: CPT | Performed by: INTERNAL MEDICINE

## 2020-08-24 PROCEDURE — 25010000002 LORAZEPAM PER 2 MG: Performed by: OBSTETRICS & GYNECOLOGY

## 2020-08-24 PROCEDURE — 97605 NEG PRS WND THER DME<=50SQCM: CPT

## 2020-08-24 PROCEDURE — 25010000002 PROPOFOL 10 MG/ML EMULSION: Performed by: NURSE ANESTHETIST, CERTIFIED REGISTERED

## 2020-08-24 PROCEDURE — 82962 GLUCOSE BLOOD TEST: CPT

## 2020-08-24 PROCEDURE — 25010000002 ENOXAPARIN PER 10 MG: Performed by: STUDENT IN AN ORGANIZED HEALTH CARE EDUCATION/TRAINING PROGRAM

## 2020-08-24 RX ORDER — IPRATROPIUM BROMIDE AND ALBUTEROL SULFATE 2.5; .5 MG/3ML; MG/3ML
3 SOLUTION RESPIRATORY (INHALATION) ONCE AS NEEDED
Status: DISCONTINUED | OUTPATIENT
Start: 2020-08-24 | End: 2020-08-24 | Stop reason: HOSPADM

## 2020-08-24 RX ORDER — PROMETHAZINE HYDROCHLORIDE 25 MG/1
25 TABLET ORAL ONCE AS NEEDED
Status: DISCONTINUED | OUTPATIENT
Start: 2020-08-24 | End: 2020-08-24 | Stop reason: HOSPADM

## 2020-08-24 RX ORDER — LIDOCAINE HYDROCHLORIDE 10 MG/ML
INJECTION, SOLUTION EPIDURAL; INFILTRATION; INTRACAUDAL; PERINEURAL AS NEEDED
Status: DISCONTINUED | OUTPATIENT
Start: 2020-08-24 | End: 2020-08-24 | Stop reason: SURG

## 2020-08-24 RX ORDER — PROMETHAZINE HYDROCHLORIDE 25 MG/1
25 SUPPOSITORY RECTAL ONCE AS NEEDED
Status: DISCONTINUED | OUTPATIENT
Start: 2020-08-24 | End: 2020-08-24 | Stop reason: HOSPADM

## 2020-08-24 RX ORDER — PROMETHAZINE HYDROCHLORIDE 25 MG/ML
6.25 INJECTION, SOLUTION INTRAMUSCULAR; INTRAVENOUS ONCE AS NEEDED
Status: DISCONTINUED | OUTPATIENT
Start: 2020-08-24 | End: 2020-08-24 | Stop reason: HOSPADM

## 2020-08-24 RX ORDER — PROPOFOL 10 MG/ML
VIAL (ML) INTRAVENOUS AS NEEDED
Status: DISCONTINUED | OUTPATIENT
Start: 2020-08-24 | End: 2020-08-24 | Stop reason: SURG

## 2020-08-24 RX ORDER — ONDANSETRON 2 MG/ML
4 INJECTION INTRAMUSCULAR; INTRAVENOUS ONCE AS NEEDED
Status: DISCONTINUED | OUTPATIENT
Start: 2020-08-24 | End: 2020-08-24 | Stop reason: HOSPADM

## 2020-08-24 RX ORDER — HYDRALAZINE HYDROCHLORIDE 20 MG/ML
5 INJECTION INTRAMUSCULAR; INTRAVENOUS
Status: DISCONTINUED | OUTPATIENT
Start: 2020-08-24 | End: 2020-08-24 | Stop reason: HOSPADM

## 2020-08-24 RX ORDER — LABETALOL HYDROCHLORIDE 5 MG/ML
5 INJECTION, SOLUTION INTRAVENOUS
Status: DISCONTINUED | OUTPATIENT
Start: 2020-08-24 | End: 2020-08-24 | Stop reason: HOSPADM

## 2020-08-24 RX ORDER — SODIUM CHLORIDE 9 MG/ML
INJECTION, SOLUTION INTRAVENOUS CONTINUOUS PRN
Status: DISCONTINUED | OUTPATIENT
Start: 2020-08-24 | End: 2020-08-24 | Stop reason: SURG

## 2020-08-24 RX ADMIN — ONDANSETRON 4 MG: 4 TABLET, ORALLY DISINTEGRATING ORAL at 20:54

## 2020-08-24 RX ADMIN — SODIUM CHLORIDE: 9 INJECTION, SOLUTION INTRAVENOUS at 11:07

## 2020-08-24 RX ADMIN — PANTOPRAZOLE SODIUM 40 MG: 40 TABLET, DELAYED RELEASE ORAL at 07:57

## 2020-08-24 RX ADMIN — SODIUM CHLORIDE, PRESERVATIVE FREE 10 ML: 5 INJECTION INTRAVENOUS at 20:56

## 2020-08-24 RX ADMIN — INSULIN HUMAN 2 UNITS: 100 INJECTION, SOLUTION PARENTERAL at 05:54

## 2020-08-24 RX ADMIN — SODIUM CHLORIDE, PRESERVATIVE FREE 10 ML: 5 INJECTION INTRAVENOUS at 08:05

## 2020-08-24 RX ADMIN — Medication 1 CAPSULE: at 08:05

## 2020-08-24 RX ADMIN — LORAZEPAM 0.5 MG: 2 INJECTION INTRAMUSCULAR; INTRAVENOUS at 16:06

## 2020-08-24 RX ADMIN — PYRIDOSTIGMINE BROMIDE 30 MG: 60 TABLET ORAL at 21:00

## 2020-08-24 RX ADMIN — NYSTATIN: 100000 POWDER TOPICAL at 20:54

## 2020-08-24 RX ADMIN — LIDOCAINE HYDROCHLORIDE 100 MG: 10 INJECTION, SOLUTION EPIDURAL; INFILTRATION; INTRACAUDAL; PERINEURAL at 11:09

## 2020-08-24 RX ADMIN — NYSTATIN 1 APPLICATION: 100000 POWDER TOPICAL at 08:05

## 2020-08-24 RX ADMIN — PROPOFOL 20 MG: 10 INJECTION, EMULSION INTRAVENOUS at 11:12

## 2020-08-24 RX ADMIN — LORAZEPAM 0.5 MG: 2 INJECTION INTRAMUSCULAR; INTRAVENOUS at 22:36

## 2020-08-24 RX ADMIN — METOPROLOL TARTRATE 25 MG: 25 TABLET, FILM COATED ORAL at 20:54

## 2020-08-24 RX ADMIN — POTASSIUM PHOSPHATE, MONOBASIC POTASSIUM PHOSPHATE, DIBASIC: 224; 236 INJECTION, SOLUTION, CONCENTRATE INTRAVENOUS at 17:46

## 2020-08-24 RX ADMIN — METOPROLOL TARTRATE 25 MG: 25 TABLET, FILM COATED ORAL at 08:05

## 2020-08-24 RX ADMIN — FLUOXETINE HYDROCHLORIDE 40 MG: 20 CAPSULE ORAL at 08:05

## 2020-08-24 RX ADMIN — ENOXAPARIN SODIUM 40 MG: 40 INJECTION SUBCUTANEOUS at 16:01

## 2020-08-24 RX ADMIN — INSULIN HUMAN 2 UNITS: 100 INJECTION, SOLUTION PARENTERAL at 00:05

## 2020-08-24 RX ADMIN — PANTOPRAZOLE SODIUM 40 MG: 40 TABLET, DELAYED RELEASE ORAL at 17:46

## 2020-08-24 RX ADMIN — PYRIDOSTIGMINE BROMIDE 30 MG: 60 TABLET ORAL at 13:19

## 2020-08-24 RX ADMIN — PYRIDOSTIGMINE BROMIDE 30 MG: 60 TABLET ORAL at 05:54

## 2020-08-24 RX ADMIN — PROPOFOL 80 MG: 10 INJECTION, EMULSION INTRAVENOUS at 11:09

## 2020-08-24 NOTE — ANESTHESIA PREPROCEDURE EVALUATION
Anesthesia Evaluation     Patient summary reviewed and Nursing notes reviewed   NPO Solid Status: > 8 hours  NPO Liquid Status: > 8 hours           Airway   Mallampati: II  TM distance: >3 FB  Neck ROM: full  No difficulty expected  Dental      Pulmonary    (+) asthma,  (-) shortness of breath, recent URI, not a smoker  Cardiovascular     ECG reviewed  Patient on routine beta blocker    (+) hypertension, dysrhythmias Atrial Fib, hyperlipidemia,   (-) angina    ROS comment: ECG  ST  ECHO Estimated EF = 60% LVDD (grade I) c/w impaired relaxation. 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   GI/Hepatic/Renal/Endo    (-) liver disease, no renal disease, diabetes, no thyroid disorder    ROS Comment: dark melanotic stools post op hysterectomy  Nausea no vomiting      Musculoskeletal     Abdominal    Substance History      OB/GYN          Other      history of cancer (uterine ca )    ROS/Med Hx Other: eliquis  hct 28       Phys Exam Other: Grade IIa view with mac 3 last week               Anesthesia Plan    ASA 3     MAC and general   (PFL   RSI ready ( Nausea) )  intravenous induction     Anesthetic plan, all risks, benefits, and alternatives have been provided, discussed and informed consent has been obtained with: patient.    Plan discussed with CRNA.

## 2020-08-24 NOTE — ANESTHESIA POSTPROCEDURE EVALUATION
Patient: Hodan Beverly    Procedure Summary     Date:  08/24/20 Room / Location:   [facility]    Anesthesia Start:  1105 Anesthesia Stop:  1119    Procedure:  ESOPHAGOGASTRODUODENOSCOPY (N/A ) Diagnosis:      Surgeon:  Brunner, Mark I, MD Provider:  Marino Bateman MD    Anesthesia Type:  MAC, general ASA Status:  3          Anesthesia Type: MAC, general    Vitals  No vitals data found for the desired time range.          Post Anesthesia Care and Evaluation    Patient location during evaluation: PACU  Patient participation: complete - patient participated  Level of consciousness: awake and alert  Pain score: 0  Pain management: adequate  Airway patency: patent  Anesthetic complications: No anesthetic complications  PONV Status: none  Cardiovascular status: hemodynamically stable and acceptable  Respiratory status: nonlabored ventilation, acceptable and nasal cannula  Hydration status: acceptable

## 2020-08-25 LAB
ANION GAP SERPL CALCULATED.3IONS-SCNC: 9 MMOL/L (ref 5–15)
BUN SERPL-MCNC: 19 MG/DL (ref 8–23)
BUN/CREAT SERPL: 50 (ref 7–25)
CALCIUM SPEC-SCNC: 8.5 MG/DL (ref 8.6–10.5)
CHLORIDE SERPL-SCNC: 102 MMOL/L (ref 98–107)
CO2 SERPL-SCNC: 23 MMOL/L (ref 22–29)
CREAT SERPL-MCNC: 0.38 MG/DL (ref 0.57–1)
CYTO UR: NORMAL
DEPRECATED RDW RBC AUTO: 51.4 FL (ref 37–54)
ERYTHROCYTE [DISTWIDTH] IN BLOOD BY AUTOMATED COUNT: 17.1 % (ref 12.3–15.4)
GFR SERPL CREATININE-BSD FRML MDRD: >150 ML/MIN/1.73
GLUCOSE BLDC GLUCOMTR-MCNC: 143 MG/DL (ref 70–130)
GLUCOSE BLDC GLUCOMTR-MCNC: 149 MG/DL (ref 70–130)
GLUCOSE BLDC GLUCOMTR-MCNC: 155 MG/DL (ref 70–130)
GLUCOSE BLDC GLUCOMTR-MCNC: 158 MG/DL (ref 70–130)
GLUCOSE SERPL-MCNC: 143 MG/DL (ref 65–99)
HCT VFR BLD AUTO: 26.3 % (ref 34–46.6)
HGB BLD-MCNC: 8.1 G/DL (ref 12–15.9)
LAB AP CASE REPORT: NORMAL
LAB AP CLINICAL INFORMATION: NORMAL
MAGNESIUM SERPL-MCNC: 2.1 MG/DL (ref 1.6–2.4)
MCH RBC QN AUTO: 25.5 PG (ref 26.6–33)
MCHC RBC AUTO-ENTMCNC: 30.8 G/DL (ref 31.5–35.7)
MCV RBC AUTO: 82.7 FL (ref 79–97)
PATH REPORT.FINAL DX SPEC: NORMAL
PATH REPORT.GROSS SPEC: NORMAL
PHOSPHATE SERPL-MCNC: 3.6 MG/DL (ref 2.5–4.5)
PLATELET # BLD AUTO: 340 10*3/MM3 (ref 140–450)
PMV BLD AUTO: 10.1 FL (ref 6–12)
POTASSIUM SERPL-SCNC: 4.2 MMOL/L (ref 3.5–5.2)
RBC # BLD AUTO: 3.18 10*6/MM3 (ref 3.77–5.28)
SODIUM SERPL-SCNC: 134 MMOL/L (ref 136–145)
WBC # BLD AUTO: 5.68 10*3/MM3 (ref 3.4–10.8)

## 2020-08-25 PROCEDURE — 25010000002 DIPHENHYDRAMINE PER 50 MG: Performed by: OBSTETRICS & GYNECOLOGY

## 2020-08-25 PROCEDURE — 63710000001 ONDANSETRON ODT 4 MG TABLET DISPERSIBLE: Performed by: STUDENT IN AN ORGANIZED HEALTH CARE EDUCATION/TRAINING PROGRAM

## 2020-08-25 PROCEDURE — 25010000002 ENOXAPARIN PER 10 MG: Performed by: STUDENT IN AN ORGANIZED HEALTH CARE EDUCATION/TRAINING PROGRAM

## 2020-08-25 PROCEDURE — 25010000002 CALCIUM GLUCONATE PER 10 ML

## 2020-08-25 PROCEDURE — 25010000002 LORAZEPAM PER 2 MG: Performed by: OBSTETRICS & GYNECOLOGY

## 2020-08-25 PROCEDURE — 83735 ASSAY OF MAGNESIUM: CPT

## 2020-08-25 PROCEDURE — 84100 ASSAY OF PHOSPHORUS: CPT

## 2020-08-25 PROCEDURE — 97110 THERAPEUTIC EXERCISES: CPT

## 2020-08-25 PROCEDURE — 85027 COMPLETE CBC AUTOMATED: CPT | Performed by: STUDENT IN AN ORGANIZED HEALTH CARE EDUCATION/TRAINING PROGRAM

## 2020-08-25 PROCEDURE — 25010000002 MAGNESIUM SULFATE PER 500 MG OF MAGNESIUM

## 2020-08-25 PROCEDURE — 80048 BASIC METABOLIC PNL TOTAL CA: CPT | Performed by: STUDENT IN AN ORGANIZED HEALTH CARE EDUCATION/TRAINING PROGRAM

## 2020-08-25 PROCEDURE — 25010000002 POTASSIUM CHLORIDE PER 2 MEQ OF POTASSIUM

## 2020-08-25 PROCEDURE — 82962 GLUCOSE BLOOD TEST: CPT

## 2020-08-25 PROCEDURE — 63710000001 INSULIN REGULAR HUMAN PER 5 UNITS

## 2020-08-25 RX ORDER — DOCUSATE SODIUM 100 MG/1
200 CAPSULE, LIQUID FILLED ORAL 2 TIMES DAILY
Status: DISCONTINUED | OUTPATIENT
Start: 2020-08-25 | End: 2020-08-25

## 2020-08-25 RX ORDER — POLYETHYLENE GLYCOL 3350 17 G/17G
17 POWDER, FOR SOLUTION ORAL DAILY
Status: DISCONTINUED | OUTPATIENT
Start: 2020-08-25 | End: 2020-08-25

## 2020-08-25 RX ADMIN — INSULIN HUMAN 2 UNITS: 100 INJECTION, SOLUTION PARENTERAL at 13:05

## 2020-08-25 RX ADMIN — ACETAMINOPHEN 650 MG: 325 TABLET, FILM COATED ORAL at 00:28

## 2020-08-25 RX ADMIN — I.V. FAT EMULSION 250 ML: 20 EMULSION INTRAVENOUS at 18:38

## 2020-08-25 RX ADMIN — LORAZEPAM 0.5 MG: 2 INJECTION INTRAMUSCULAR; INTRAVENOUS at 17:51

## 2020-08-25 RX ADMIN — Medication 1 CAPSULE: at 08:57

## 2020-08-25 RX ADMIN — DOCUSATE SODIUM 200 MG: 100 CAPSULE, LIQUID FILLED ORAL at 08:57

## 2020-08-25 RX ADMIN — TRAMADOL HYDROCHLORIDE 50 MG: 50 TABLET, FILM COATED ORAL at 17:46

## 2020-08-25 RX ADMIN — ONDANSETRON 4 MG: 4 TABLET, ORALLY DISINTEGRATING ORAL at 09:19

## 2020-08-25 RX ADMIN — SODIUM CHLORIDE, PRESERVATIVE FREE 10 ML: 5 INJECTION INTRAVENOUS at 21:56

## 2020-08-25 RX ADMIN — ENOXAPARIN SODIUM 40 MG: 40 INJECTION SUBCUTANEOUS at 08:57

## 2020-08-25 RX ADMIN — LORAZEPAM 0.5 MG: 2 INJECTION INTRAMUSCULAR; INTRAVENOUS at 23:00

## 2020-08-25 RX ADMIN — FLUOXETINE HYDROCHLORIDE 40 MG: 20 CAPSULE ORAL at 08:57

## 2020-08-25 RX ADMIN — PYRIDOSTIGMINE BROMIDE 30 MG: 60 TABLET ORAL at 06:55

## 2020-08-25 RX ADMIN — NYSTATIN: 100000 POWDER TOPICAL at 21:56

## 2020-08-25 RX ADMIN — METOPROLOL TARTRATE 25 MG: 25 TABLET, FILM COATED ORAL at 08:57

## 2020-08-25 RX ADMIN — ONDANSETRON 4 MG: 4 TABLET, ORALLY DISINTEGRATING ORAL at 16:34

## 2020-08-25 RX ADMIN — DIPHENHYDRAMINE HYDROCHLORIDE 25 MG: 50 INJECTION INTRAMUSCULAR; INTRAVENOUS at 00:33

## 2020-08-25 RX ADMIN — METOPROLOL TARTRATE 25 MG: 25 TABLET, FILM COATED ORAL at 21:56

## 2020-08-25 RX ADMIN — NYSTATIN: 100000 POWDER TOPICAL at 08:57

## 2020-08-25 RX ADMIN — PANTOPRAZOLE SODIUM 40 MG: 40 TABLET, DELAYED RELEASE ORAL at 08:57

## 2020-08-25 RX ADMIN — PANTOPRAZOLE SODIUM 40 MG: 40 TABLET, DELAYED RELEASE ORAL at 17:46

## 2020-08-25 RX ADMIN — INSULIN HUMAN 2 UNITS: 100 INJECTION, SOLUTION PARENTERAL at 00:28

## 2020-08-25 RX ADMIN — POTASSIUM PHOSPHATE, MONOBASIC POTASSIUM PHOSPHATE, DIBASIC: 224; 236 INJECTION, SOLUTION, CONCENTRATE INTRAVENOUS at 18:37

## 2020-08-26 LAB
ANION GAP SERPL CALCULATED.3IONS-SCNC: 9 MMOL/L (ref 5–15)
BUN SERPL-MCNC: 20 MG/DL (ref 8–23)
BUN/CREAT SERPL: 45.5 (ref 7–25)
CALCIUM SPEC-SCNC: 8.6 MG/DL (ref 8.6–10.5)
CHLORIDE SERPL-SCNC: 98 MMOL/L (ref 98–107)
CO2 SERPL-SCNC: 23 MMOL/L (ref 22–29)
CREAT SERPL-MCNC: 0.44 MG/DL (ref 0.57–1)
DEPRECATED RDW RBC AUTO: 49.7 FL (ref 37–54)
ERYTHROCYTE [DISTWIDTH] IN BLOOD BY AUTOMATED COUNT: 16.8 % (ref 12.3–15.4)
GFR SERPL CREATININE-BSD FRML MDRD: 143 ML/MIN/1.73
GLUCOSE BLDC GLUCOMTR-MCNC: 127 MG/DL (ref 70–130)
GLUCOSE BLDC GLUCOMTR-MCNC: 147 MG/DL (ref 70–130)
GLUCOSE BLDC GLUCOMTR-MCNC: 154 MG/DL (ref 70–130)
GLUCOSE BLDC GLUCOMTR-MCNC: 163 MG/DL (ref 70–130)
GLUCOSE SERPL-MCNC: 142 MG/DL (ref 65–99)
HCT VFR BLD AUTO: 28.9 % (ref 34–46.6)
HGB BLD-MCNC: 9 G/DL (ref 12–15.9)
MCH RBC QN AUTO: 25.3 PG (ref 26.6–33)
MCHC RBC AUTO-ENTMCNC: 31.1 G/DL (ref 31.5–35.7)
MCV RBC AUTO: 81.2 FL (ref 79–97)
PLATELET # BLD AUTO: 358 10*3/MM3 (ref 140–450)
PMV BLD AUTO: 10.3 FL (ref 6–12)
POTASSIUM SERPL-SCNC: 4.2 MMOL/L (ref 3.5–5.2)
RBC # BLD AUTO: 3.56 10*6/MM3 (ref 3.77–5.28)
SODIUM SERPL-SCNC: 130 MMOL/L (ref 136–145)
WBC # BLD AUTO: 7.74 10*3/MM3 (ref 3.4–10.8)

## 2020-08-26 PROCEDURE — 63710000001 ONDANSETRON ODT 4 MG TABLET DISPERSIBLE: Performed by: INTERNAL MEDICINE

## 2020-08-26 PROCEDURE — 63710000001 INSULIN REGULAR HUMAN PER 5 UNITS: Performed by: INTERNAL MEDICINE

## 2020-08-26 PROCEDURE — 80048 BASIC METABOLIC PNL TOTAL CA: CPT | Performed by: STUDENT IN AN ORGANIZED HEALTH CARE EDUCATION/TRAINING PROGRAM

## 2020-08-26 PROCEDURE — 25010000002 MAGNESIUM SULFATE PER 500 MG OF MAGNESIUM

## 2020-08-26 PROCEDURE — 85027 COMPLETE CBC AUTOMATED: CPT | Performed by: STUDENT IN AN ORGANIZED HEALTH CARE EDUCATION/TRAINING PROGRAM

## 2020-08-26 PROCEDURE — 25010000002 POTASSIUM CHLORIDE PER 2 MEQ OF POTASSIUM

## 2020-08-26 PROCEDURE — 63710000001 PROMETHAZINE PER 12.5 MG: Performed by: INTERNAL MEDICINE

## 2020-08-26 PROCEDURE — 82962 GLUCOSE BLOOD TEST: CPT

## 2020-08-26 PROCEDURE — 63710000001 INSULIN REGULAR HUMAN PER 5 UNITS

## 2020-08-26 PROCEDURE — 97535 SELF CARE MNGMENT TRAINING: CPT

## 2020-08-26 PROCEDURE — 97110 THERAPEUTIC EXERCISES: CPT

## 2020-08-26 PROCEDURE — 97530 THERAPEUTIC ACTIVITIES: CPT

## 2020-08-26 PROCEDURE — 97605 NEG PRS WND THER DME<=50SQCM: CPT

## 2020-08-26 PROCEDURE — 25010000002 CALCIUM GLUCONATE PER 10 ML

## 2020-08-26 PROCEDURE — 25010000002 ENOXAPARIN PER 10 MG: Performed by: STUDENT IN AN ORGANIZED HEALTH CARE EDUCATION/TRAINING PROGRAM

## 2020-08-26 RX ORDER — SODIUM CHLORIDE, SODIUM LACTATE, POTASSIUM CHLORIDE, CALCIUM CHLORIDE 600; 310; 30; 20 MG/100ML; MG/100ML; MG/100ML; MG/100ML
30 INJECTION, SOLUTION INTRAVENOUS CONTINUOUS PRN
Status: DISCONTINUED | OUTPATIENT
Start: 2020-08-26 | End: 2020-08-28 | Stop reason: HOSPADM

## 2020-08-26 RX ADMIN — PANTOPRAZOLE SODIUM 40 MG: 40 TABLET, DELAYED RELEASE ORAL at 08:41

## 2020-08-26 RX ADMIN — SODIUM CHLORIDE, PRESERVATIVE FREE 10 ML: 5 INJECTION INTRAVENOUS at 08:41

## 2020-08-26 RX ADMIN — PANTOPRAZOLE SODIUM 40 MG: 40 TABLET, DELAYED RELEASE ORAL at 17:24

## 2020-08-26 RX ADMIN — PROMETHAZINE HYDROCHLORIDE 12.5 MG: 12.5 TABLET ORAL at 15:05

## 2020-08-26 RX ADMIN — NYSTATIN: 100000 POWDER TOPICAL at 20:52

## 2020-08-26 RX ADMIN — ENOXAPARIN SODIUM 40 MG: 40 INJECTION SUBCUTANEOUS at 08:40

## 2020-08-26 RX ADMIN — Medication 1 CAPSULE: at 08:41

## 2020-08-26 RX ADMIN — FLUOXETINE HYDROCHLORIDE 40 MG: 20 CAPSULE ORAL at 08:41

## 2020-08-26 RX ADMIN — METOPROLOL TARTRATE 25 MG: 25 TABLET, FILM COATED ORAL at 08:41

## 2020-08-26 RX ADMIN — INSULIN HUMAN 2 UNITS: 100 INJECTION, SOLUTION PARENTERAL at 12:39

## 2020-08-26 RX ADMIN — METOPROLOL TARTRATE 25 MG: 25 TABLET, FILM COATED ORAL at 20:52

## 2020-08-26 RX ADMIN — INSULIN HUMAN 2 UNITS: 100 INJECTION, SOLUTION PARENTERAL at 00:05

## 2020-08-26 RX ADMIN — NYSTATIN: 100000 POWDER TOPICAL at 08:42

## 2020-08-26 RX ADMIN — ONDANSETRON 4 MG: 4 TABLET, ORALLY DISINTEGRATING ORAL at 10:44

## 2020-08-26 RX ADMIN — POTASSIUM PHOSPHATE, MONOBASIC POTASSIUM PHOSPHATE, DIBASIC: 224; 236 INJECTION, SOLUTION, CONCENTRATE INTRAVENOUS at 17:24

## 2020-08-27 LAB
GLUCOSE BLDC GLUCOMTR-MCNC: 133 MG/DL (ref 70–130)
GLUCOSE BLDC GLUCOMTR-MCNC: 145 MG/DL (ref 70–130)
GLUCOSE BLDC GLUCOMTR-MCNC: 151 MG/DL (ref 70–130)
GLUCOSE BLDC GLUCOMTR-MCNC: 157 MG/DL (ref 70–130)
MAGNESIUM SERPL-MCNC: 2.1 MG/DL (ref 1.6–2.4)
PHOSPHATE SERPL-MCNC: 3 MG/DL (ref 2.5–4.5)

## 2020-08-27 PROCEDURE — 83735 ASSAY OF MAGNESIUM: CPT

## 2020-08-27 PROCEDURE — 25010000002 MAGNESIUM SULFATE PER 500 MG OF MAGNESIUM

## 2020-08-27 PROCEDURE — 63710000001 ONDANSETRON ODT 4 MG TABLET DISPERSIBLE: Performed by: INTERNAL MEDICINE

## 2020-08-27 PROCEDURE — 25010000002 CALCIUM GLUCONATE PER 10 ML

## 2020-08-27 PROCEDURE — 25010000002 POTASSIUM CHLORIDE PER 2 MEQ OF POTASSIUM

## 2020-08-27 PROCEDURE — 63710000001 PROMETHAZINE PER 12.5 MG: Performed by: INTERNAL MEDICINE

## 2020-08-27 PROCEDURE — 63710000001 INSULIN REGULAR HUMAN PER 5 UNITS: Performed by: INTERNAL MEDICINE

## 2020-08-27 PROCEDURE — 82962 GLUCOSE BLOOD TEST: CPT

## 2020-08-27 PROCEDURE — 84100 ASSAY OF PHOSPHORUS: CPT

## 2020-08-27 PROCEDURE — 97605 NEG PRS WND THER DME<=50SQCM: CPT

## 2020-08-27 RX ORDER — TRAMADOL HYDROCHLORIDE 50 MG/1
50 TABLET ORAL EVERY 6 HOURS PRN
Qty: 10 TABLET | Refills: 0 | Status: SHIPPED | OUTPATIENT
Start: 2020-08-27 | End: 2020-08-31

## 2020-08-27 RX ORDER — LORAZEPAM 0.5 MG/1
0.5 TABLET ORAL EVERY 8 HOURS PRN
Status: DISCONTINUED | OUTPATIENT
Start: 2020-08-27 | End: 2020-08-28 | Stop reason: HOSPADM

## 2020-08-27 RX ORDER — ACETAMINOPHEN 325 MG/1
650 TABLET ORAL EVERY 6 HOURS PRN
Qty: 60 TABLET | Refills: 3 | Status: SHIPPED | OUTPATIENT
Start: 2020-08-27 | End: 2020-08-28 | Stop reason: HOSPADM

## 2020-08-27 RX ORDER — ONDANSETRON 4 MG/1
4 TABLET, ORALLY DISINTEGRATING ORAL EVERY 6 HOURS PRN
Qty: 10 TABLET | Refills: 5 | Status: SHIPPED | OUTPATIENT
Start: 2020-08-27

## 2020-08-27 RX ORDER — NYSTATIN 100000 [USP'U]/G
POWDER TOPICAL EVERY 12 HOURS SCHEDULED
Qty: 15 G | Refills: 3 | Status: SHIPPED | OUTPATIENT
Start: 2020-08-27

## 2020-08-27 RX ADMIN — ONDANSETRON 4 MG: 4 TABLET, ORALLY DISINTEGRATING ORAL at 09:27

## 2020-08-27 RX ADMIN — INSULIN HUMAN 2 UNITS: 100 INJECTION, SOLUTION PARENTERAL at 12:19

## 2020-08-27 RX ADMIN — FLUOXETINE HYDROCHLORIDE 40 MG: 20 CAPSULE ORAL at 08:56

## 2020-08-27 RX ADMIN — SODIUM CHLORIDE, PRESERVATIVE FREE 10 ML: 5 INJECTION INTRAVENOUS at 21:27

## 2020-08-27 RX ADMIN — NYSTATIN: 100000 POWDER TOPICAL at 21:26

## 2020-08-27 RX ADMIN — PANTOPRAZOLE SODIUM 40 MG: 40 TABLET, DELAYED RELEASE ORAL at 08:56

## 2020-08-27 RX ADMIN — TRAMADOL HYDROCHLORIDE 50 MG: 50 TABLET, FILM COATED ORAL at 14:17

## 2020-08-27 RX ADMIN — I.V. FAT EMULSION 250 ML: 20 EMULSION INTRAVENOUS at 17:56

## 2020-08-27 RX ADMIN — POTASSIUM PHOSPHATE, MONOBASIC POTASSIUM PHOSPHATE, DIBASIC: 224; 236 INJECTION, SOLUTION, CONCENTRATE INTRAVENOUS at 18:00

## 2020-08-27 RX ADMIN — METOPROLOL TARTRATE 25 MG: 25 TABLET, FILM COATED ORAL at 21:27

## 2020-08-27 RX ADMIN — SODIUM CHLORIDE, PRESERVATIVE FREE 10 ML: 5 INJECTION INTRAVENOUS at 08:56

## 2020-08-27 RX ADMIN — NYSTATIN: 100000 POWDER TOPICAL at 08:56

## 2020-08-27 RX ADMIN — Medication 1 CAPSULE: at 08:56

## 2020-08-27 RX ADMIN — METOPROLOL TARTRATE 25 MG: 25 TABLET, FILM COATED ORAL at 08:56

## 2020-08-27 RX ADMIN — APIXABAN 5 MG: 5 TABLET, FILM COATED ORAL at 21:27

## 2020-08-27 RX ADMIN — APIXABAN 5 MG: 5 TABLET, FILM COATED ORAL at 08:56

## 2020-08-27 RX ADMIN — PROMETHAZINE HYDROCHLORIDE 12.5 MG: 12.5 TABLET ORAL at 12:20

## 2020-08-27 RX ADMIN — LORAZEPAM 0.5 MG: 0.5 TABLET ORAL at 21:37

## 2020-08-27 RX ADMIN — PANTOPRAZOLE SODIUM 40 MG: 40 TABLET, DELAYED RELEASE ORAL at 18:00

## 2020-08-28 VITALS
HEIGHT: 66 IN | BODY MASS INDEX: 29.73 KG/M2 | SYSTOLIC BLOOD PRESSURE: 119 MMHG | HEART RATE: 73 BPM | TEMPERATURE: 98.2 F | DIASTOLIC BLOOD PRESSURE: 73 MMHG | OXYGEN SATURATION: 97 % | WEIGHT: 185 LBS | RESPIRATION RATE: 16 BRPM

## 2020-08-28 LAB
GLUCOSE BLDC GLUCOMTR-MCNC: 148 MG/DL (ref 70–130)
GLUCOSE BLDC GLUCOMTR-MCNC: 173 MG/DL (ref 70–130)

## 2020-08-28 PROCEDURE — 63710000001 ONDANSETRON ODT 4 MG TABLET DISPERSIBLE: Performed by: INTERNAL MEDICINE

## 2020-08-28 PROCEDURE — 97602 WOUND(S) CARE NON-SELECTIVE: CPT

## 2020-08-28 PROCEDURE — 82962 GLUCOSE BLOOD TEST: CPT

## 2020-08-28 PROCEDURE — 63710000001 INSULIN REGULAR HUMAN PER 5 UNITS: Performed by: INTERNAL MEDICINE

## 2020-08-28 RX ORDER — OMEPRAZOLE 20 MG/1
20 CAPSULE, DELAYED RELEASE ORAL 2 TIMES DAILY
Start: 2020-08-28

## 2020-08-28 RX ORDER — LORAZEPAM 0.5 MG/1
0.5 TABLET ORAL EVERY 8 HOURS PRN
Start: 2020-08-28 | End: 2020-09-03

## 2020-08-28 RX ORDER — FLUOXETINE 10 MG/1
20 CAPSULE ORAL
Qty: 30 CAPSULE | Refills: 3
Start: 2020-08-28 | End: 2020-10-21

## 2020-08-28 RX ORDER — DIPHENHYDRAMINE HCL 25 MG
25 CAPSULE ORAL NIGHTLY
Start: 2020-08-28

## 2020-08-28 RX ORDER — FLUOXETINE HYDROCHLORIDE 20 MG/1
60 CAPSULE ORAL DAILY
Start: 2020-08-28

## 2020-08-28 RX ORDER — ACETAMINOPHEN 325 MG/1
650 TABLET ORAL EVERY 6 HOURS PRN
Qty: 60 TABLET | Refills: 3 | Status: SHIPPED | OUTPATIENT
Start: 2020-08-28

## 2020-08-28 RX ORDER — FLUOXETINE HYDROCHLORIDE 20 MG/1
60 CAPSULE ORAL DAILY
Status: DISCONTINUED | OUTPATIENT
Start: 2020-08-28 | End: 2020-08-28 | Stop reason: HOSPADM

## 2020-08-28 RX ORDER — GABAPENTIN 300 MG/1
300 CAPSULE ORAL 3 TIMES DAILY
Start: 2020-08-28

## 2020-08-28 RX ORDER — FLUOXETINE HYDROCHLORIDE 20 MG/1
60 CAPSULE ORAL DAILY
Qty: 30 CAPSULE | Refills: 2
Start: 2020-08-28 | End: 2020-08-28

## 2020-08-28 RX ORDER — GABAPENTIN 300 MG/1
300 CAPSULE ORAL 3 TIMES DAILY
Status: DISCONTINUED | OUTPATIENT
Start: 2020-08-28 | End: 2020-08-28 | Stop reason: HOSPADM

## 2020-08-28 RX ORDER — FLUOXETINE HYDROCHLORIDE 20 MG/1
60 CAPSULE ORAL DAILY
Qty: 30 CAPSULE | Refills: 2
Start: 2020-08-28 | End: 2020-10-21

## 2020-08-28 RX ADMIN — INSULIN HUMAN 2 UNITS: 100 INJECTION, SOLUTION PARENTERAL at 05:57

## 2020-08-28 RX ADMIN — PANTOPRAZOLE SODIUM 40 MG: 40 TABLET, DELAYED RELEASE ORAL at 08:05

## 2020-08-28 RX ADMIN — ONDANSETRON 4 MG: 4 TABLET, ORALLY DISINTEGRATING ORAL at 08:05

## 2020-08-28 RX ADMIN — GABAPENTIN 300 MG: 300 CAPSULE ORAL at 08:05

## 2020-08-28 RX ADMIN — APIXABAN 5 MG: 5 TABLET, FILM COATED ORAL at 08:05

## 2020-08-28 RX ADMIN — FLUOXETINE HYDROCHLORIDE 60 MG: 20 CAPSULE ORAL at 08:05

## 2020-08-28 RX ADMIN — LORAZEPAM 0.5 MG: 0.5 TABLET ORAL at 08:04

## 2020-08-28 RX ADMIN — METOPROLOL TARTRATE 25 MG: 25 TABLET, FILM COATED ORAL at 08:05

## 2020-08-28 RX ADMIN — TRAMADOL HYDROCHLORIDE 50 MG: 50 TABLET, FILM COATED ORAL at 12:26

## 2020-09-17 ENCOUNTER — TELEPHONE (OUTPATIENT)
Dept: GYNECOLOGIC ONCOLOGY | Facility: CLINIC | Age: 70
End: 2020-09-17

## 2020-09-17 NOTE — TELEPHONE ENCOUNTER
Pt called currently in the hospital at [facility] transferring today to [facility] in [place]  Please cancel appt for tomorrow 9/18  Pt will call back to reschedule

## 2020-10-01 ENCOUNTER — TELEPHONE (OUTPATIENT)
Dept: GYNECOLOGIC ONCOLOGY | Facility: CLINIC | Age: 70
End: 2020-10-01

## 2020-10-19 NOTE — PROGRESS NOTES
"Hodan Beverly  [id number]  1950      Reason for Visit:   Postoperative evaluation    History of Present Illness:  Patient is a very pleasant 69 y.o. woman who presents for a post operative evaluation status post exploratory laparotomy, debulking of mass, right ureteral lysis, ileocecal resection with side-to-side anastomosis, cystoscopy with temporal left ureteral stent performed on 8/11/20.      Surgery and hospital course were complicated by anemia requiring transfusion, ileus with slow return of bowel function requiring TPN, melena status post unremarkable EGD and wound separation requiring wound VAC placement. She was evaluated by physical therapy and was discharged to acute care rehab on POD 17.  She spent a total of 3 weeks in rehab facility in [place] and then went to [facility] for 2 weeks.  She is in her usual state of excellent spirits today.  She is accompanied by her  as always.    Past Medical History, Past Surgical History, Social History, Family History have been reviewed and are without significant changes except as mentioned.    Review of Systems   All other systems were reviewed and are negative except as mentioned above.    Medications:  The current medication list was reviewed in the EMR    ALLERGIES:  No Known Allergies        /57   Pulse 80   Temp 98 °F (36.7 °C) (Temporal)   Resp 19   Ht 167.6 cm (65.98\")   Wt 83.9 kg (185 lb)   SpO2 94%   BMI 29.87 kg/m²        Physical Exam  Constitutional:  Patient is a pleasant woman in no acute distress.  Gastrointestinal: Abdomen is soft and appropriately tender.  There is no mass palpated.  There is no rebound or guarding.  Incision has minimal drainage.  Dressing is clean dry and intact otherwise.  Free of drainage is very focal.  Extremities:  Bilateral lower extremities are non-tender.  Gynecologic: Deferred    PATHOLOGY:  Final Diagnosis  1. SMALL BOWEL SEGMENTAL RESECTION:  Serosal fibrous adhesions, no mucosal " atypia or neoplasia identified.  2. PELVIC MASS:  Leiomyoma, no atypia identified.  3. CECUM:  Cecum with serosal fibrous adhesions  Vermiform appendix with fibrous obliteration of the lumen and serosal fibrous adhesions.  DGD:ecv    ASSESSMENT/PLAN:  Hodan Beverly returns for a post-operative evaluation today status post exploratory laparotomy, debulking of mass, right ureteral lysis, ileocecal resection with side-to-side anastomosis, cystoscopy with temporal left ureteral stent.  All pathology reports were discussed with the patient. Final pathology was benign      Overall, the patient is very pleased with her care.  I recommended continuation of post operative precautions as discussed.     She is to follow-up on an as-needed basis    Patient was seen and examined with Dr. Thornton,  resident, who performed portions of the examination and documentation for this patient's care under my direct supervision.  I agree with the above documentation and plan.    Aleida Salmeron MD  10/21/20  15:53 EDT

## 2020-10-21 ENCOUNTER — TELEPHONE (OUTPATIENT)
Dept: OBSTETRICS AND GYNECOLOGY | Facility: CLINIC | Age: 70
End: 2020-10-21

## 2020-10-21 ENCOUNTER — OFFICE VISIT (OUTPATIENT)
Dept: GYNECOLOGIC ONCOLOGY | Facility: CLINIC | Age: 70
End: 2020-10-21

## 2020-10-21 VITALS
HEART RATE: 80 BPM | HEIGHT: 66 IN | RESPIRATION RATE: 19 BRPM | DIASTOLIC BLOOD PRESSURE: 57 MMHG | TEMPERATURE: 98 F | SYSTOLIC BLOOD PRESSURE: 114 MMHG | OXYGEN SATURATION: 94 % | WEIGHT: 185 LBS | BODY MASS INDEX: 29.73 KG/M2

## 2020-10-21 DIAGNOSIS — Z98.890 POST-OPERATIVE STATE: Primary | ICD-10-CM

## 2020-10-21 PROCEDURE — 99024 POSTOP FOLLOW-UP VISIT: CPT | Performed by: OBSTETRICS & GYNECOLOGY

## 2020-10-21 NOTE — TELEPHONE ENCOUNTER
I have called the patient to ascertain her status following a lengthy hospitalization and rehabilitation for excision of a thick leiomyoma and partial bowel resection by Dr. Salmeron.  The patient indicates that she is doing well.  She is trying to regain her strength.  As always, she has a wonderful attitude.  They do plan to move to [place] at some point in the future and she will notify me prior to that time.

## 2020-10-30 ENCOUNTER — TELEPHONE (OUTPATIENT)
Dept: GYNECOLOGIC ONCOLOGY | Facility: CLINIC | Age: 70
End: 2020-10-30

## 2020-11-06 ENCOUNTER — TELEPHONE (OUTPATIENT)
Dept: GYNECOLOGIC ONCOLOGY | Facility: CLINIC | Age: 70
End: 2020-11-06

## 2020-11-06 NOTE — TELEPHONE ENCOUNTER
ENRIQUE FROM [facility] CALLED ON BEHALF OF THE PT TO SEE IF ANY NEW WOUND CARE ORDERS WERE MADE. SHE SENT PICTURE IN EARLIER OF WOUND. AND SHE WAS ALSO WONDERING IF THERE ARE ANY NEW ANTIBIOTICS.    ENRIQUE CAN BE REACHED AT:  [phone]

## 2020-11-09 NOTE — TELEPHONE ENCOUNTER
Called  nurse discussing with her clinic didn't receive email from  agency. Nurse to fax new pictures over.

## 2020-12-08 ENCOUNTER — TRANSCRIBE ORDERS (OUTPATIENT)
Dept: ADMINISTRATIVE | Facility: HOSPITAL | Age: 70
End: 2020-12-08

## 2020-12-08 DIAGNOSIS — R19.05 PERIUMBILIC SWELLING, MASS OR LUMP: Primary | ICD-10-CM

## 2020-12-11 ENCOUNTER — HOSPITAL ENCOUNTER (OUTPATIENT)
Dept: CT IMAGING | Facility: HOSPITAL | Age: 70
Discharge: HOME | End: 2020-12-11
Admitting: INTERNAL MEDICINE

## 2020-12-11 DIAGNOSIS — R19.05 PERIUMBILIC SWELLING, MASS OR LUMP: ICD-10-CM

## 2020-12-11 LAB — CREAT BLDA-MCNC: 0.7 MG/DL (ref 0.6–1.3)

## 2020-12-11 PROCEDURE — 74177 CT ABD & PELVIS W/CONTRAST: CPT

## 2020-12-11 PROCEDURE — 0 DIATRIZOATE MEGLUMINE & SODIUM PER 1 ML: Performed by: INTERNAL MEDICINE

## 2020-12-11 PROCEDURE — 25010000002 IOPAMIDOL 61 % SOLUTION: Performed by: INTERNAL MEDICINE

## 2020-12-11 PROCEDURE — 82565 ASSAY OF CREATININE: CPT

## 2020-12-11 RX ADMIN — IOPAMIDOL 85 ML: 612 INJECTION, SOLUTION INTRAVENOUS at 15:47

## 2020-12-11 RX ADMIN — DIATRIZOATE MEGLUMINE AND DIATRIZOATE SODIUM 15 ML: 660; 100 LIQUID ORAL; RECTAL at 15:47

## 2020-12-14 ENCOUNTER — DOCUMENTATION (OUTPATIENT)
Dept: GYNECOLOGIC ONCOLOGY | Facility: CLINIC | Age: 70
End: 2020-12-14

## 2020-12-14 NOTE — PROGRESS NOTES
After reviewing the images, I discussed CT scan findings with Dr. Marques.  I agree with the report.

## (undated) DEVICE — LEX D AND C: Brand: MEDLINE INDUSTRIES, INC.

## (undated) DEVICE — SUT PDS LP 1 TP1 96IN VIO PDP880GA

## (undated) DEVICE — SAFESECURE,SECUREMENT,FOLEY CATH,STERILE: Brand: MEDLINE

## (undated) DEVICE — PREMIUM DRY TRAY LF: Brand: MEDLINE INDUSTRIES, INC.

## (undated) DEVICE — SUT PROLN 4/0 RB1 D/A 36IN 8557H

## (undated) DEVICE — HYBRID CO2 TUBING/CAP SET FOR OLYMPUS® SCOPES & CO2 SOURCE: Brand: ERBE

## (undated) DEVICE — CATH URETRL WHSTL TP 5F70CM RT

## (undated) DEVICE — GOWN,NON-REINFORCED,SIRUS,SET IN SLV,XL: Brand: MEDLINE

## (undated) DEVICE — THE BITE BLOCK MAXI, LATEX FREE STRAP IS USED TO PROTECT THE ENDOSCOPE INSERTION TUBE FROM BEING BITTEN BY THE PATIENT.

## (undated) DEVICE — SUT VIC 12X27 D8116 BX/12

## (undated) DEVICE — GOWN,REINF,POLY,ECL,PP SLV,XL: Brand: MEDLINE

## (undated) DEVICE — CVR HNDL LIGHT RIGID

## (undated) DEVICE — 3M™ WARMING BLANKET, UPPER BODY, 10 PER CASE, 42268: Brand: BAIR HUGGER™

## (undated) DEVICE — MEDI-VAC YANKAUER SUCTION HANDLE W/BULBOUS TIP: Brand: CARDINAL HEALTH

## (undated) DEVICE — 3M™ STERI-DRAPE™ INSTRUMENT POUCH 1018: Brand: STERI-DRAPE™

## (undated) DEVICE — METER,URINE,400ML,DRAIN BAG,L/F,LL,SLIDE: Brand: MEDLINE

## (undated) DEVICE — LEX BASIC NO DRAPE: Brand: MEDLINE INDUSTRIES, INC.

## (undated) DEVICE — SYR LUERLOK 50ML

## (undated) DEVICE — DRAPE,UNDERBUTTOCKS,STERILE: Brand: MEDLINE

## (undated) DEVICE — SPNG LAP PREWSH SFTPK 18X18IN STRL PK/5

## (undated) DEVICE — GLV SURG SENSICARE PI MIC PF SZ6 LF STRL

## (undated) DEVICE — KT ORCA ORCAPOD DISP STRL

## (undated) DEVICE — CONTN GRAD MEAS TRIANG 32OZ BLK

## (undated) DEVICE — SINGLE-USE BIOPSY FORCEPS: Brand: RADIAL JAW 4

## (undated) DEVICE — DRAPE, LAVH, STERILE: Brand: MEDLINE

## (undated) DEVICE — SYR LL TP 10ML STRL

## (undated) DEVICE — GLV SURG DERMASSURE GRN LF PF SZ 6.5

## (undated) DEVICE — SPNG ENDO BEDSIDE TUB ENZYM

## (undated) DEVICE — GLV SURG SENSICARE PI MIC PF SZ6.5 LF STRL

## (undated) DEVICE — CYSTO/BLADDER IRRIGATION SET, REGULATING CLAMP

## (undated) DEVICE — PROVIDES A STERILE INTERFACE BETWEEN THE OPERATING ROOM SURGICAL LAMPS (NON-STERILE) AND THE SURGEON OR NURSE (STERILE).: Brand: STERION®CLAMP COVER FABRIC

## (undated) DEVICE — SKIN AFFIX SURG ADHESIVE 72/CS 0.55ML: Brand: MEDLINE

## (undated) DEVICE — SUT SILK 2/0 PS 18IN 1588H

## (undated) DEVICE — DEV OPN LIGASURE CRV 180D 36MM 13.5CM  1P/U

## (undated) DEVICE — ANTIBACTERIAL VIOLET BRAIDED (POLYGLACTIN 910), SYNTHETIC ABSORBABLE SUTURE: Brand: COATED VICRYL

## (undated) DEVICE — SOL IRR H2O BTL 1000ML STRL

## (undated) DEVICE — TUBING, SUCTION, 1/4" X 10', STRAIGHT: Brand: MEDLINE

## (undated) DEVICE — LUBE GEL ENDOGLIDE 1.1OZ

## (undated) DEVICE — INTRO ACCSR BLNT TP

## (undated) DEVICE — DRAIN JACKSON PRATT ROUND 15FR: Brand: CARDINAL HEALTH

## (undated) DEVICE — PENCL E/S HNDSWCH ROCKRBTN HOLSTR 10FT